# Patient Record
Sex: FEMALE | Race: ASIAN | NOT HISPANIC OR LATINO | URBAN - METROPOLITAN AREA
[De-identification: names, ages, dates, MRNs, and addresses within clinical notes are randomized per-mention and may not be internally consistent; named-entity substitution may affect disease eponyms.]

---

## 2018-04-01 ENCOUNTER — INPATIENT (INPATIENT)
Facility: HOSPITAL | Age: 23
LOS: 21 days | Discharge: HOME | End: 2018-04-23
Attending: PSYCHIATRY & NEUROLOGY | Admitting: PSYCHIATRY & NEUROLOGY
Payer: COMMERCIAL

## 2018-04-01 VITALS
TEMPERATURE: 98 F | SYSTOLIC BLOOD PRESSURE: 124 MMHG | HEART RATE: 98 BPM | OXYGEN SATURATION: 98 % | RESPIRATION RATE: 16 BRPM | DIASTOLIC BLOOD PRESSURE: 84 MMHG

## 2018-04-01 DIAGNOSIS — F43.10 POST-TRAUMATIC STRESS DISORDER, UNSPECIFIED: ICD-10-CM

## 2018-04-01 DIAGNOSIS — R45.851 SUICIDAL IDEATIONS: ICD-10-CM

## 2018-04-01 LAB
ALBUMIN SERPL ELPH-MCNC: 4.7 G/DL — SIGNIFICANT CHANGE UP (ref 3.3–5)
ALP SERPL-CCNC: 54 U/L — SIGNIFICANT CHANGE UP (ref 40–120)
ALT FLD-CCNC: 15 U/L — SIGNIFICANT CHANGE UP (ref 4–33)
AMORPH CRY # UR COMP ASSIST: SIGNIFICANT CHANGE UP (ref 0–0)
AMPHET UR-MCNC: NEGATIVE — SIGNIFICANT CHANGE UP
APAP SERPL-MCNC: < 15 UG/ML — LOW (ref 15–25)
APPEARANCE UR: SIGNIFICANT CHANGE UP
AST SERPL-CCNC: 17 U/L — SIGNIFICANT CHANGE UP (ref 4–32)
BACTERIA # UR AUTO: SIGNIFICANT CHANGE UP
BARBITURATES UR SCN-MCNC: NEGATIVE — SIGNIFICANT CHANGE UP
BASOPHILS # BLD AUTO: 0.04 K/UL — SIGNIFICANT CHANGE UP (ref 0–0.2)
BASOPHILS NFR BLD AUTO: 0.4 % — SIGNIFICANT CHANGE UP (ref 0–2)
BENZODIAZ UR-MCNC: NEGATIVE — SIGNIFICANT CHANGE UP
BILIRUB SERPL-MCNC: 0.3 MG/DL — SIGNIFICANT CHANGE UP (ref 0.2–1.2)
BILIRUB UR-MCNC: NEGATIVE — SIGNIFICANT CHANGE UP
BLOOD UR QL VISUAL: HIGH
BUN SERPL-MCNC: 7 MG/DL — SIGNIFICANT CHANGE UP (ref 7–23)
CALCIUM SERPL-MCNC: 9.2 MG/DL — SIGNIFICANT CHANGE UP (ref 8.4–10.5)
CANNABINOIDS UR-MCNC: POSITIVE — SIGNIFICANT CHANGE UP
CHLORIDE SERPL-SCNC: 96 MMOL/L — LOW (ref 98–107)
CO2 SERPL-SCNC: 28 MMOL/L — SIGNIFICANT CHANGE UP (ref 22–31)
COCAINE METAB.OTHER UR-MCNC: NEGATIVE — SIGNIFICANT CHANGE UP
COD CRY URNS QL: SIGNIFICANT CHANGE UP (ref 0–0)
COLOR SPEC: YELLOW — SIGNIFICANT CHANGE UP
CREAT SERPL-MCNC: 0.64 MG/DL — SIGNIFICANT CHANGE UP (ref 0.5–1.3)
EOSINOPHIL # BLD AUTO: 0.02 K/UL — SIGNIFICANT CHANGE UP (ref 0–0.5)
EOSINOPHIL NFR BLD AUTO: 0.2 % — SIGNIFICANT CHANGE UP (ref 0–6)
ETHANOL BLD-MCNC: < 10 MG/DL — SIGNIFICANT CHANGE UP
GLUCOSE SERPL-MCNC: 125 MG/DL — HIGH (ref 70–99)
GLUCOSE UR-MCNC: NEGATIVE — SIGNIFICANT CHANGE UP
GRAN CASTS # UR COMP ASSIST: SIGNIFICANT CHANGE UP
HCT VFR BLD CALC: 40.7 % — SIGNIFICANT CHANGE UP (ref 34.5–45)
HGB BLD-MCNC: 13.7 G/DL — SIGNIFICANT CHANGE UP (ref 11.5–15.5)
HIV1 AG SER QL: SIGNIFICANT CHANGE UP
HIV1+2 AB SPEC QL: SIGNIFICANT CHANGE UP
IMM GRANULOCYTES # BLD AUTO: 0.04 # — SIGNIFICANT CHANGE UP
IMM GRANULOCYTES NFR BLD AUTO: 0.4 % — SIGNIFICANT CHANGE UP (ref 0–1.5)
KETONES UR-MCNC: NEGATIVE — SIGNIFICANT CHANGE UP
LEUKOCYTE ESTERASE UR-ACNC: NEGATIVE — SIGNIFICANT CHANGE UP
LYMPHOCYTES # BLD AUTO: 1.83 K/UL — SIGNIFICANT CHANGE UP (ref 1–3.3)
LYMPHOCYTES # BLD AUTO: 19.9 % — SIGNIFICANT CHANGE UP (ref 13–44)
MAGNESIUM SERPL-MCNC: 2.2 MG/DL — SIGNIFICANT CHANGE UP (ref 1.6–2.6)
MCHC RBC-ENTMCNC: 33.3 PG — SIGNIFICANT CHANGE UP (ref 27–34)
MCHC RBC-ENTMCNC: 33.7 % — SIGNIFICANT CHANGE UP (ref 32–36)
MCV RBC AUTO: 99 FL — SIGNIFICANT CHANGE UP (ref 80–100)
METHADONE UR-MCNC: NEGATIVE — SIGNIFICANT CHANGE UP
MONOCYTES # BLD AUTO: 0.67 K/UL — SIGNIFICANT CHANGE UP (ref 0–0.9)
MONOCYTES NFR BLD AUTO: 7.3 % — SIGNIFICANT CHANGE UP (ref 2–14)
MUCOUS THREADS # UR AUTO: SIGNIFICANT CHANGE UP
NEUTROPHILS # BLD AUTO: 6.58 K/UL — SIGNIFICANT CHANGE UP (ref 1.8–7.4)
NEUTROPHILS NFR BLD AUTO: 71.8 % — SIGNIFICANT CHANGE UP (ref 43–77)
NITRITE UR-MCNC: NEGATIVE — SIGNIFICANT CHANGE UP
NRBC # FLD: 0 — SIGNIFICANT CHANGE UP
OPIATES UR-MCNC: NEGATIVE — SIGNIFICANT CHANGE UP
OXYCODONE UR-MCNC: NEGATIVE — SIGNIFICANT CHANGE UP
PCP UR-MCNC: NEGATIVE — SIGNIFICANT CHANGE UP
PH UR: 7 — SIGNIFICANT CHANGE UP (ref 4.6–8)
PHOSPHATE SERPL-MCNC: 3.9 MG/DL — SIGNIFICANT CHANGE UP (ref 2.5–4.5)
PLATELET # BLD AUTO: 265 K/UL — SIGNIFICANT CHANGE UP (ref 150–400)
PMV BLD: 11 FL — SIGNIFICANT CHANGE UP (ref 7–13)
POTASSIUM SERPL-MCNC: 3.3 MMOL/L — LOW (ref 3.5–5.3)
POTASSIUM SERPL-SCNC: 3.3 MMOL/L — LOW (ref 3.5–5.3)
PROT SERPL-MCNC: 7.9 G/DL — SIGNIFICANT CHANGE UP (ref 6–8.3)
PROT UR-MCNC: 30 MG/DL — HIGH
RBC # BLD: 4.11 M/UL — SIGNIFICANT CHANGE UP (ref 3.8–5.2)
RBC # FLD: 11.7 % — SIGNIFICANT CHANGE UP (ref 10.3–14.5)
RBC CASTS # UR COMP ASSIST: HIGH (ref 0–?)
SALICYLATES SERPL-MCNC: < 5 MG/DL — LOW (ref 15–30)
SODIUM SERPL-SCNC: 137 MMOL/L — SIGNIFICANT CHANGE UP (ref 135–145)
SP GR SPEC: 1.02 — SIGNIFICANT CHANGE UP (ref 1–1.04)
SQUAMOUS # UR AUTO: SIGNIFICANT CHANGE UP
TSH SERPL-MCNC: 1.75 UIU/ML — SIGNIFICANT CHANGE UP (ref 0.27–4.2)
UROBILINOGEN FLD QL: NORMAL MG/DL — SIGNIFICANT CHANGE UP
WBC # BLD: 9.18 K/UL — SIGNIFICANT CHANGE UP (ref 3.8–10.5)
WBC # FLD AUTO: 9.18 K/UL — SIGNIFICANT CHANGE UP (ref 3.8–10.5)
WBC UR QL: SIGNIFICANT CHANGE UP (ref 0–?)

## 2018-04-01 PROCEDURE — 71046 X-RAY EXAM CHEST 2 VIEWS: CPT | Mod: 26

## 2018-04-01 PROCEDURE — 99285 EMERGENCY DEPT VISIT HI MDM: CPT | Mod: GC

## 2018-04-01 RX ORDER — DIPHENHYDRAMINE HCL 50 MG
50 CAPSULE ORAL EVERY 6 HOURS
Qty: 0 | Refills: 0 | Status: DISCONTINUED | OUTPATIENT
Start: 2018-04-01 | End: 2018-04-23

## 2018-04-01 RX ORDER — HALOPERIDOL DECANOATE 100 MG/ML
1 INJECTION INTRAMUSCULAR EVERY 6 HOURS
Qty: 0 | Refills: 0 | Status: DISCONTINUED | OUTPATIENT
Start: 2018-04-01 | End: 2018-04-23

## 2018-04-01 RX ORDER — HYDROXYZINE HCL 10 MG
50 TABLET ORAL EVERY 6 HOURS
Qty: 0 | Refills: 0 | Status: DISCONTINUED | OUTPATIENT
Start: 2018-04-01 | End: 2018-04-23

## 2018-04-01 RX ORDER — SODIUM CHLORIDE 9 MG/ML
1000 INJECTION INTRAMUSCULAR; INTRAVENOUS; SUBCUTANEOUS ONCE
Qty: 0 | Refills: 0 | Status: COMPLETED | OUTPATIENT
Start: 2018-04-01 | End: 2018-04-01

## 2018-04-01 RX ADMIN — SODIUM CHLORIDE 2000 MILLILITER(S): 9 INJECTION INTRAMUSCULAR; INTRAVENOUS; SUBCUTANEOUS at 10:00
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
## 2018-04-01 NOTE — ED BEHAVIORAL HEALTH ASSESSMENT NOTE - MEDICAL ISSUES AND PLAN (INCLUDE STANDING AND PRN MEDICATION)
encourage eating as collateral states patient with 14 pound weight loss over past 6 months; monitor weight (if IV fluids needed, consider transfer to [facility])
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
## 2018-04-01 NOTE — ED BEHAVIORAL HEALTH ASSESSMENT NOTE - DESCRIPTION
calm, cooperative, at times with speech latency, laughing (stating she is nervous), crying at times lives with family in [place], at times travels from aunt's house to college as it is closer childhood asthma (grew out of it) calm, cooperative, at times with speech latency, laughing (stating she is nervous), crying at times, paranoid, wondering what's going on childhood asthma (grew out of it, not on medications currently) lives with family in [place], father is medical director of spine services at [employer], at times travels from aunt's house to college as it is closer

## 2018-04-01 NOTE — ED PROVIDER NOTE - PROGRESS NOTE DETAILS
JW  Father in room during interview.  24 y/o F no pmh p/w syncope at midnight. pt's father stated they had given her nyquil to sleep at midnight because the patient was insomnic then pt slept for 2 hours, woke up "delirious" per father suffered episode of syncope.  No fall or head strike.  No fevers, chills, sweats, weight loss, fatigue, or malaise. Pt states she has not eaten the past two days.  No other illness the past week.  Father in the room demanding IV line.  States he is the doctor.  PT refusing IV requesting PO. Pt states she lied to her father, begins crying then is silent refusing to provide additional history.  Pt endorses substance abuse does not disclose the type.   VS WNL.  Repeat exam: Physical Exam: General: alert and oriented x3 in no acute distress.  Cardiovascular: Regular rate and rhythm, S1 and S2 normal.  No murmurs, rubs, or gallops.  Pulses equal bilaterally.  No carotid bruits.  No peripheral edema or JVD.  Respiratory: No respiratory distress.  Lungs clear to auscultation bilaterally without adventitial breath sounds.  Abdominal: Non-distended, normal bowel sounds in all four quadrants, non tender to percussion and palpation in all four quadrants.  No mass, rigidity, or guarding.  Extremities: No sign of trauma, inflammation, or effusion.  Full range of motion in the cervical, lumbar, and thoracic spine and all four extremities without limitation.  No peripheral edema.  Neurological: AOx3 NAD.  Cranial nerves I-XII intact.  Normal gross motor and sensory function. Pt ambulatory.  Psych: Anxious appearing, no eye contact.  Speech clear, thought content normal, thought process linear.  No auditory or visual hallucinations.  Pt denies SI, plan, attempt. A/P DDX FTT, psychosis, anorexia.  Social issues.  Evaluate for syncope, clear for psychiatric evaluation, treat symptomatically, admit to psych. Patient removed IV, agrees to drink water, father now states that patient voiced SI to him earlier.  Patient denies this. JW mild hypokalemia. Pt tolerating PO. Pt endorses SI.  Psych consultation placed.
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
## 2018-04-01 NOTE — ED BEHAVIORAL HEALTH ASSESSMENT NOTE - RISK ASSESSMENT
Acute risk factors include recent trauma, insomnia, suspected mood episode, recent substance use, severe anxiety/paranoia, decreased appetite with substantial weight loss, limited insight into illness, confusion. Chronic risk factors include genetic loading for mental illness. Protective risk factors include intelligent, no history of suicide attempts, no self injurious behavior, no reported SI/HI or AVH. Acute risk factors include recent trauma, insomnia, suspected mood episode, recent substance use, severe anxiety/paranoia, decreased appetite with substantial weight loss, limited insight into illness, confusion. Chronic risk factors include genetic loading for mental illness. Protective risk factors include intelligent, no history of suicide attempts, no self injurious behavior, no reported SI/HI or AVH. Currently, risk factors significantly outweigh protective factors, warranting inpatient psychiatric hospitalization as patient has significantly decompensated from baseline, not tending to ADLs for past few days, with passive SI, ambivalent intent/plan, very paranoid in setting of ongoing PTSD symptoms from acute trauma months ago. Risk will be mitigated by inpatient hospitalization for safety, stabilization, and medication management.
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
## 2018-04-01 NOTE — ED ADULT NURSE NOTE - OBJECTIVE STATEMENT
Patient brought in by father for syncopal episode.  Patient pacing around the room, appears paranoid, hesitant to speak around father and Patient brought in by father for syncopal episode.  Patient pacing around the room, appears paranoid and hesitant to speak around her father.  Patient declined to answer any questions at this time.  Per father, patient not feeling well.  She pulled out her IV, unable to redirect, MD notified.  Placed on 1:1 for safety.  PCA at bedside and will continue to monitor patient.

## 2018-04-01 NOTE — ED PROVIDER NOTE - ATTENDING CONTRIBUTION TO CARE
Dr. Briseno: I have personally performed a face to face bedside history and physical examination of this patient. I have discussed the history, examination, review of systems, assessment and plan of management with the resident. I have reviewed the electronic medical record and amended it to reflect my history, review of systems, physical exam, assessment and plan.    Attending Exam - Dr. Briseno: GEN: well appearing, NAD  HEENT: +PERRL, EOMI  RESP: CTAB, no signs of respiratory distress CV: s1s2 RRR ABD: soft/non tender/non distended  MSK: no deformities / swelling, normal range of motion, spine grossly normal NEURO: alert, non focal exam SKIN: normal color / temperature / condition. PSYCH: appears anxious, tearful at times, evasive when questioned and will give contradictory answers.  Patient voices no SI/HI to provider.  Appears slightly paranoid at times.

## 2018-04-01 NOTE — ED ADULT TRIAGE NOTE - CHIEF COMPLAINT QUOTE
Pt c/o "not feeling right & dizziness" sp 2 witnessed syncopal eps by father who states she never hit her head & had approx LOC 20 secs. Denies any other adverse symptoms at this time. Father states pt has had insomnia & decreased PO intake x 2 nights. FS in field 101 mg/dL.

## 2018-04-01 NOTE — ED BEHAVIORAL HEALTH ASSESSMENT NOTE - DESCRIPTION (FIRST USE, LAST USE, QUANTITY, FREQUENCY, DURATION)
intermittently for past few months as per father, three times in past few months Adderall Adderall, "orange pill"
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
## 2018-04-01 NOTE — ED BEHAVIORAL HEALTH NOTE - BEHAVIORAL HEALTH NOTE
Writer spoke with [employer] #[phone] and received one day auth #[id number]. Review due on 4/2/18 with reviewer to be assigned on following business day.

## 2018-04-01 NOTE — ED ADULT NURSE REASSESSMENT NOTE - NS ED NURSE REASSESS COMMENT FT1
Received report from JEISON Maxwell pt bought to   calm & cooperative in nad denies Si/Hi/AVh at present eval on going.
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
## 2018-04-01 NOTE — ED BEHAVIORAL HEALTH ASSESSMENT NOTE - PSYCHIATRIC ISSUES AND PLAN (INCLUDE STANDING AND PRN MEDICATION)
Benadryl 50 mg PO QHS PRN for insomnia, hydroxyzine 50 mg PO Q6 hr PRN for anxiety, Ativan 1 mg PO Q 6 hr PRN for severe anxiety, Haldol 1 mg PO Q6 hr PRN for severe agitation/paranois (prefer Abilify PO if patient continues to be paranoid despite sleep and anxiety resolution), consider Prazosin 1 mg PO QHS if patient reports nightmare at night Benadryl 50 mg PO QHS PRN for insomnia, hydroxyzine 50 mg PO Q6 hr PRN for anxiety, Ativan 1 mg PO Q 6 hr PRN for severe anxiety, Haldol 1 mg PO Q6 hr PRN for severe agitation/paranois (prefer Abilify PO if patient continues to be paranoid despite sleep and anxiety resolution), consider Prazosin 1 mg PO QHS if patient reports nightmare at night, consider antidepressant (Zoloft, Prozac) for depressive and anxiety symptoms if continue to persist

## 2018-04-01 NOTE — ED BEHAVIORAL HEALTH ASSESSMENT NOTE - SUICIDE PROTECTIVE FACTORS
Supportive social network or family/Engaged in work or school/Future oriented/Responsibility to family and others/Identifies reasons for living

## 2018-04-01 NOTE — ED BEHAVIORAL HEALTH ASSESSMENT NOTE - OTHER PAST PSYCHIATRIC HISTORY (INCLUDE DETAILS REGARDING ONSET, COURSE OF ILLNESS, INPATIENT/OUTPATIENT TREATMENT)
no previous hospitalizations no previous hospitalizations, has been seeing therapist/counselor intermittently on campus

## 2018-04-01 NOTE — ED BEHAVIORAL HEALTH ASSESSMENT NOTE - NS ED [facility] ED COURSE PSYCHIATRIC MEDICATION GIVEN
Problem: Goal Outcome Summary  Goal: Goal Outcome Summary  Outcome: Improving  Afebrile. Pain well controlled with tylenol and ibuprofen. Good PO intake, no stool, use pear juice BID per home regimen. Lung sounds clear, but pt does have upper airway congestion. One dose of IV decadron given this afternoon per ENT request. Multiple oxygen desaturations into the 80s with sleep but pt self resolves in 10-15 seconds. No apnea seen. Monitor overnight for oxygen desaturations requiring intervention and for any apnea. Mother and father at bedside, attentive to pt, participating in cares. Pt transferred to Unit 6 for monitoring overnight.        None Oral Medication (indication, name, dose, time)

## 2018-04-02 LAB
BACTERIA UR CULT: SIGNIFICANT CHANGE UP
SPECIMEN SOURCE: SIGNIFICANT CHANGE UP

## 2018-04-02 RX ORDER — RISPERIDONE 4 MG/1
0.5 TABLET ORAL
Qty: 0 | Refills: 0 | Status: DISCONTINUED | OUTPATIENT
Start: 2018-04-02 | End: 2018-04-23

## 2018-04-02 RX ORDER — RISPERIDONE 4 MG/1
1 TABLET ORAL ONCE
Qty: 0 | Refills: 0 | Status: COMPLETED | OUTPATIENT
Start: 2018-04-02 | End: 2018-04-02

## 2018-04-02 RX ORDER — RISPERIDONE 4 MG/1
1 TABLET ORAL AT BEDTIME
Qty: 0 | Refills: 0 | Status: DISCONTINUED | OUTPATIENT
Start: 2018-04-02 | End: 2018-04-04

## 2018-04-02 RX ADMIN — Medication 30 MILLILITER(S): at 03:35

## 2018-04-02 RX ADMIN — Medication 50 MILLIGRAM(S): at 00:20

## 2018-04-02 RX ADMIN — Medication 1 MILLIGRAM(S): at 12:11

## 2018-04-02 RX ADMIN — Medication 1 MILLIGRAM(S): at 21:20

## 2018-04-02 RX ADMIN — RISPERIDONE 1 MILLIGRAM(S): 4 TABLET ORAL at 12:11

## 2018-04-02 RX ADMIN — Medication 50 MILLIGRAM(S): at 02:37

## 2018-04-02 RX ADMIN — Medication 500 MILLIGRAM(S): at 04:56

## 2018-04-02 RX ADMIN — RISPERIDONE 1 MILLIGRAM(S): 4 TABLET ORAL at 21:20

## 2018-04-02 NOTE — CHART NOTE - NSCHARTNOTEFT_GEN_A_CORE
23 year old female with no pertinent PMH presenting today complaining of abdominal pain x 2 hours. Pt is AAOx3 and seems anxious upon exam. Pt states she has diffuse abdominal pain across b/l lower quadrants and can not pinpoint exact location. Pt states she can not tell what makes pain better or worse. Pt vitals are WNL.   On physical exam abdomen is soft, nontender to palpation, no guarding noted, Normoactive bowel sounds heard in all 4 quadrants.   Pt given Naproxen 500mg for pain and Maalox for dyspepsia. Will continue to monitor. 23 year old female with no pertinent PMH presenting today complaining of abdominal pain x 2 hours. Pt is AAOx3 and seems anxious upon exam. Pt states she has diffuse abdominal pain across b/l lower quadrants and can not pinpoint exact location. Pt states she can not tell what makes pain better or worse. Pt vitals are WNL. Pt denies going through menstrual cramps currently. Denies any fever, chills, SOB, chest pain, N/V/D/C.    On physical exam abdomen is soft, nontender to palpation, no guarding noted, Normoactive bowel sounds heard in all 4 quadrants. Lungs clear to auscultation B/l. Heart regular rate and rhythm; +s1 and +s2, no murmurs or gallops.  Pt given Naproxen 500mg for pain and Maalox for dyspepsia. Will continue to monitor.

## 2018-04-03 RX ADMIN — Medication 1 MILLIGRAM(S): at 08:51

## 2018-04-03 RX ADMIN — Medication 1 MILLIGRAM(S): at 13:07

## 2018-04-03 RX ADMIN — RISPERIDONE 1 MILLIGRAM(S): 4 TABLET ORAL at 21:36

## 2018-04-03 RX ADMIN — Medication 2 MILLIGRAM(S): at 21:36

## 2018-04-04 PROCEDURE — 99232 SBSQ HOSP IP/OBS MODERATE 35: CPT

## 2018-04-04 RX ORDER — RISPERIDONE 4 MG/1
2 TABLET ORAL AT BEDTIME
Qty: 0 | Refills: 0 | Status: DISCONTINUED | OUTPATIENT
Start: 2018-04-04 | End: 2018-04-10

## 2018-04-04 RX ADMIN — Medication 2 MILLIGRAM(S): at 21:22

## 2018-04-04 RX ADMIN — Medication 1 MILLIGRAM(S): at 16:46

## 2018-04-04 RX ADMIN — Medication 1 MILLIGRAM(S): at 13:39

## 2018-04-04 RX ADMIN — RISPERIDONE 1 MILLIGRAM(S): 4 TABLET ORAL at 21:21

## 2018-04-04 RX ADMIN — Medication 1 MILLIGRAM(S): at 08:14

## 2018-04-05 PROCEDURE — 99233 SBSQ HOSP IP/OBS HIGH 50: CPT | Mod: 25

## 2018-04-05 PROCEDURE — 90853 GROUP PSYCHOTHERAPY: CPT

## 2018-04-05 RX ORDER — FLUOXETINE HCL 10 MG
20 CAPSULE ORAL ONCE
Qty: 0 | Refills: 0 | Status: COMPLETED | OUTPATIENT
Start: 2018-04-05 | End: 2018-04-05

## 2018-04-05 RX ORDER — FLUOXETINE HCL 10 MG
20 CAPSULE ORAL DAILY
Qty: 0 | Refills: 0 | Status: DISCONTINUED | OUTPATIENT
Start: 2018-04-05 | End: 2018-04-10

## 2018-04-05 RX ADMIN — Medication 20 MILLIGRAM(S): at 12:13

## 2018-04-05 RX ADMIN — Medication 2 MILLIGRAM(S): at 22:21

## 2018-04-05 RX ADMIN — RISPERIDONE 2 MILLIGRAM(S): 4 TABLET ORAL at 22:21

## 2018-04-06 PROCEDURE — 99233 SBSQ HOSP IP/OBS HIGH 50: CPT

## 2018-04-06 RX ORDER — RISPERIDONE 4 MG/1
1 TABLET ORAL DAILY
Qty: 0 | Refills: 0 | Status: DISCONTINUED | OUTPATIENT
Start: 2018-04-07 | End: 2018-04-10

## 2018-04-06 RX ORDER — RISPERIDONE 4 MG/1
1 TABLET ORAL ONCE
Qty: 0 | Refills: 0 | Status: COMPLETED | OUTPATIENT
Start: 2018-04-06 | End: 2018-04-06

## 2018-04-06 RX ADMIN — RISPERIDONE 1 MILLIGRAM(S): 4 TABLET ORAL at 14:06

## 2018-04-06 RX ADMIN — RISPERIDONE 2 MILLIGRAM(S): 4 TABLET ORAL at 21:38

## 2018-04-06 RX ADMIN — Medication 1 MILLIGRAM(S): at 14:06

## 2018-04-06 RX ADMIN — Medication 2 MILLIGRAM(S): at 21:38

## 2018-04-06 RX ADMIN — Medication 20 MILLIGRAM(S): at 09:31

## 2018-04-06 RX ADMIN — Medication 1 MILLIGRAM(S): at 09:31

## 2018-04-07 RX ADMIN — Medication 20 MILLIGRAM(S): at 09:55

## 2018-04-07 RX ADMIN — Medication 1 MILLIGRAM(S): at 09:55

## 2018-04-07 RX ADMIN — RISPERIDONE 2 MILLIGRAM(S): 4 TABLET ORAL at 20:42

## 2018-04-07 RX ADMIN — Medication 2 MILLIGRAM(S): at 20:42

## 2018-04-07 RX ADMIN — RISPERIDONE 1 MILLIGRAM(S): 4 TABLET ORAL at 09:55

## 2018-04-08 RX ADMIN — Medication 20 MILLIGRAM(S): at 08:51

## 2018-04-08 RX ADMIN — Medication 1 MILLIGRAM(S): at 08:51

## 2018-04-08 RX ADMIN — RISPERIDONE 1 MILLIGRAM(S): 4 TABLET ORAL at 08:51

## 2018-04-08 RX ADMIN — RISPERIDONE 2 MILLIGRAM(S): 4 TABLET ORAL at 21:13

## 2018-04-09 PROCEDURE — 99233 SBSQ HOSP IP/OBS HIGH 50: CPT

## 2018-04-09 PROCEDURE — 90832 PSYTX W PT 30 MINUTES: CPT

## 2018-04-09 RX ADMIN — Medication 1 MILLIGRAM(S): at 12:32

## 2018-04-09 RX ADMIN — RISPERIDONE 2 MILLIGRAM(S): 4 TABLET ORAL at 20:41

## 2018-04-09 RX ADMIN — RISPERIDONE 1 MILLIGRAM(S): 4 TABLET ORAL at 08:54

## 2018-04-09 RX ADMIN — Medication 20 MILLIGRAM(S): at 08:54

## 2018-04-09 RX ADMIN — Medication 2 MILLIGRAM(S): at 20:41

## 2018-04-09 RX ADMIN — Medication 1 MILLIGRAM(S): at 08:54

## 2018-04-10 PROCEDURE — 99232 SBSQ HOSP IP/OBS MODERATE 35: CPT

## 2018-04-10 RX ORDER — FLUOXETINE HCL 10 MG
20 CAPSULE ORAL DAILY
Qty: 0 | Refills: 0 | Status: DISCONTINUED | OUTPATIENT
Start: 2018-04-10 | End: 2018-04-17

## 2018-04-10 RX ORDER — RISPERIDONE 4 MG/1
3 TABLET ORAL AT BEDTIME
Qty: 0 | Refills: 0 | Status: DISCONTINUED | OUTPATIENT
Start: 2018-04-10 | End: 2018-04-13

## 2018-04-10 RX ADMIN — Medication 1 MILLIGRAM(S): at 20:25

## 2018-04-10 RX ADMIN — Medication 20 MILLIGRAM(S): at 09:38

## 2018-04-10 RX ADMIN — RISPERIDONE 3 MILLIGRAM(S): 4 TABLET ORAL at 20:25

## 2018-04-10 RX ADMIN — Medication 1 MILLIGRAM(S): at 13:07

## 2018-04-10 RX ADMIN — Medication 1 MILLIGRAM(S): at 09:38

## 2018-04-11 PROCEDURE — 99232 SBSQ HOSP IP/OBS MODERATE 35: CPT

## 2018-04-11 PROCEDURE — 90832 PSYTX W PT 30 MINUTES: CPT

## 2018-04-11 RX ADMIN — Medication 1 MILLIGRAM(S): at 21:22

## 2018-04-11 RX ADMIN — Medication 1 MILLIGRAM(S): at 09:52

## 2018-04-11 RX ADMIN — RISPERIDONE 3 MILLIGRAM(S): 4 TABLET ORAL at 21:22

## 2018-04-11 RX ADMIN — Medication 20 MILLIGRAM(S): at 09:52

## 2018-04-11 RX ADMIN — Medication 1 MILLIGRAM(S): at 12:45

## 2018-04-12 PROCEDURE — 99232 SBSQ HOSP IP/OBS MODERATE 35: CPT

## 2018-04-12 RX ADMIN — Medication 1 MILLIGRAM(S): at 20:55

## 2018-04-12 RX ADMIN — Medication 1 MILLIGRAM(S): at 09:03

## 2018-04-12 RX ADMIN — Medication 20 MILLIGRAM(S): at 09:03

## 2018-04-12 RX ADMIN — RISPERIDONE 3 MILLIGRAM(S): 4 TABLET ORAL at 20:55

## 2018-04-12 RX ADMIN — Medication 1 MILLIGRAM(S): at 12:48

## 2018-04-13 PROCEDURE — 90853 GROUP PSYCHOTHERAPY: CPT

## 2018-04-13 PROCEDURE — 99232 SBSQ HOSP IP/OBS MODERATE 35: CPT | Mod: 25

## 2018-04-13 RX ORDER — RISPERIDONE 4 MG/1
4 TABLET ORAL AT BEDTIME
Qty: 0 | Refills: 0 | Status: DISCONTINUED | OUTPATIENT
Start: 2018-04-13 | End: 2018-04-23

## 2018-04-13 RX ORDER — RISPERIDONE 4 MG/1
43 TABLET ORAL AT BEDTIME
Qty: 0 | Refills: 0 | Status: DISCONTINUED | OUTPATIENT
Start: 2018-04-13 | End: 2018-04-13

## 2018-04-13 RX ADMIN — Medication 15 MILLILITER(S): at 18:37

## 2018-04-13 RX ADMIN — Medication 1 MILLIGRAM(S): at 14:00

## 2018-04-13 RX ADMIN — RISPERIDONE 4 MILLIGRAM(S): 4 TABLET ORAL at 20:33

## 2018-04-13 RX ADMIN — Medication 20 MILLIGRAM(S): at 08:25

## 2018-04-13 RX ADMIN — Medication 1 MILLIGRAM(S): at 20:33

## 2018-04-14 RX ORDER — PANTOPRAZOLE SODIUM 20 MG/1
40 TABLET, DELAYED RELEASE ORAL
Qty: 0 | Refills: 0 | Status: DISCONTINUED | OUTPATIENT
Start: 2018-04-14 | End: 2018-04-17

## 2018-04-14 RX ORDER — PANTOPRAZOLE SODIUM 20 MG/1
40 TABLET, DELAYED RELEASE ORAL ONCE
Qty: 0 | Refills: 0 | Status: DISCONTINUED | OUTPATIENT
Start: 2018-04-14 | End: 2018-04-23

## 2018-04-14 RX ADMIN — Medication 1 MILLIGRAM(S): at 13:30

## 2018-04-14 RX ADMIN — Medication 1 MILLIGRAM(S): at 21:28

## 2018-04-14 RX ADMIN — Medication 20 MILLIGRAM(S): at 09:28

## 2018-04-14 RX ADMIN — RISPERIDONE 4 MILLIGRAM(S): 4 TABLET ORAL at 21:28

## 2018-04-14 RX ADMIN — PANTOPRAZOLE SODIUM 40 MILLIGRAM(S): 20 TABLET, DELAYED RELEASE ORAL at 16:04

## 2018-04-14 RX ADMIN — Medication 1 MILLIGRAM(S): at 09:31

## 2018-04-15 RX ADMIN — Medication 1 MILLIGRAM(S): at 10:12

## 2018-04-15 RX ADMIN — Medication 1 MILLIGRAM(S): at 14:09

## 2018-04-15 RX ADMIN — PANTOPRAZOLE SODIUM 40 MILLIGRAM(S): 20 TABLET, DELAYED RELEASE ORAL at 10:12

## 2018-04-15 RX ADMIN — Medication 1 MILLIGRAM(S): at 21:12

## 2018-04-15 RX ADMIN — RISPERIDONE 4 MILLIGRAM(S): 4 TABLET ORAL at 21:12

## 2018-04-15 RX ADMIN — Medication 20 MILLIGRAM(S): at 10:12

## 2018-04-16 RX ADMIN — Medication 1 MILLIGRAM(S): at 20:28

## 2018-04-16 RX ADMIN — Medication 1 MILLIGRAM(S): at 12:49

## 2018-04-16 RX ADMIN — Medication 1 MILLIGRAM(S): at 08:52

## 2018-04-16 RX ADMIN — Medication 20 MILLIGRAM(S): at 08:52

## 2018-04-16 RX ADMIN — PANTOPRAZOLE SODIUM 40 MILLIGRAM(S): 20 TABLET, DELAYED RELEASE ORAL at 08:53

## 2018-04-16 RX ADMIN — RISPERIDONE 4 MILLIGRAM(S): 4 TABLET ORAL at 20:28

## 2018-04-17 RX ADMIN — Medication 1 MILLIGRAM(S): at 13:51

## 2018-04-17 RX ADMIN — Medication 1 MILLIGRAM(S): at 20:31

## 2018-04-17 RX ADMIN — RISPERIDONE 4 MILLIGRAM(S): 4 TABLET ORAL at 20:31

## 2018-04-17 RX ADMIN — Medication 20 MILLIGRAM(S): at 08:41

## 2018-04-17 RX ADMIN — Medication 1 MILLIGRAM(S): at 08:41

## 2018-04-18 RX ORDER — FLUOXETINE HCL 10 MG
40 CAPSULE ORAL ONCE
Qty: 0 | Refills: 0 | Status: COMPLETED | OUTPATIENT
Start: 2018-04-18 | End: 2018-04-18

## 2018-04-18 RX ORDER — FLUOXETINE HCL 10 MG
40 CAPSULE ORAL DAILY
Qty: 0 | Refills: 0 | Status: DISCONTINUED | OUTPATIENT
Start: 2018-04-18 | End: 2018-04-23

## 2018-04-18 RX ADMIN — RISPERIDONE 4 MILLIGRAM(S): 4 TABLET ORAL at 21:23

## 2018-04-18 RX ADMIN — Medication 40 MILLIGRAM(S): at 16:25

## 2018-04-18 RX ADMIN — Medication 1 MILLIGRAM(S): at 12:50

## 2018-04-18 RX ADMIN — Medication 1 MILLIGRAM(S): at 09:07

## 2018-04-19 LAB
ALBUMIN SERPL ELPH-MCNC: 4.3 G/DL — SIGNIFICANT CHANGE UP (ref 3.3–5)
ALP SERPL-CCNC: 55 U/L — SIGNIFICANT CHANGE UP (ref 40–120)
ALT FLD-CCNC: 14 U/L — SIGNIFICANT CHANGE UP (ref 4–33)
APPEARANCE UR: SIGNIFICANT CHANGE UP
AST SERPL-CCNC: 14 U/L — SIGNIFICANT CHANGE UP (ref 4–32)
BACTERIA # UR AUTO: SIGNIFICANT CHANGE UP
BILIRUB SERPL-MCNC: 0.5 MG/DL — SIGNIFICANT CHANGE UP (ref 0.2–1.2)
BILIRUB UR-MCNC: NEGATIVE — SIGNIFICANT CHANGE UP
BLOOD UR QL VISUAL: NEGATIVE — SIGNIFICANT CHANGE UP
BUN SERPL-MCNC: 11 MG/DL — SIGNIFICANT CHANGE UP (ref 7–23)
CALCIUM SERPL-MCNC: 9.5 MG/DL — SIGNIFICANT CHANGE UP (ref 8.4–10.5)
CHLORIDE SERPL-SCNC: 99 MMOL/L — SIGNIFICANT CHANGE UP (ref 98–107)
CO2 SERPL-SCNC: 28 MMOL/L — SIGNIFICANT CHANGE UP (ref 22–31)
COLOR SPEC: SIGNIFICANT CHANGE UP
CREAT SERPL-MCNC: 0.68 MG/DL — SIGNIFICANT CHANGE UP (ref 0.5–1.3)
GLUCOSE SERPL-MCNC: 86 MG/DL — SIGNIFICANT CHANGE UP (ref 70–99)
GLUCOSE UR-MCNC: NEGATIVE — SIGNIFICANT CHANGE UP
HCG SERPL-ACNC: < 5 MIU/ML — SIGNIFICANT CHANGE UP
HCT VFR BLD CALC: 40.8 % — SIGNIFICANT CHANGE UP (ref 34.5–45)
HGB BLD-MCNC: 13.3 G/DL — SIGNIFICANT CHANGE UP (ref 11.5–15.5)
KETONES UR-MCNC: NEGATIVE — SIGNIFICANT CHANGE UP
LEUKOCYTE ESTERASE UR-ACNC: NEGATIVE — SIGNIFICANT CHANGE UP
LIDOCAIN IGE QN: 38.9 U/L — SIGNIFICANT CHANGE UP (ref 7–60)
MCHC RBC-ENTMCNC: 32.6 % — SIGNIFICANT CHANGE UP (ref 32–36)
MCHC RBC-ENTMCNC: 32.6 PG — SIGNIFICANT CHANGE UP (ref 27–34)
MCV RBC AUTO: 100 FL — SIGNIFICANT CHANGE UP (ref 80–100)
MUCOUS THREADS # UR AUTO: SIGNIFICANT CHANGE UP
NITRITE UR-MCNC: NEGATIVE — SIGNIFICANT CHANGE UP
NON-SQ EPI CELLS # UR AUTO: 1 — SIGNIFICANT CHANGE UP
NRBC # FLD: 0 — SIGNIFICANT CHANGE UP
PH UR: 7 — SIGNIFICANT CHANGE UP (ref 4.6–8)
PLATELET # BLD AUTO: 186 K/UL — SIGNIFICANT CHANGE UP (ref 150–400)
PMV BLD: 11.7 FL — SIGNIFICANT CHANGE UP (ref 7–13)
POTASSIUM SERPL-MCNC: 3.8 MMOL/L — SIGNIFICANT CHANGE UP (ref 3.5–5.3)
POTASSIUM SERPL-SCNC: 3.8 MMOL/L — SIGNIFICANT CHANGE UP (ref 3.5–5.3)
PROT SERPL-MCNC: 7.5 G/DL — SIGNIFICANT CHANGE UP (ref 6–8.3)
PROT UR-MCNC: NEGATIVE MG/DL — SIGNIFICANT CHANGE UP
RBC # BLD: 4.08 M/UL — SIGNIFICANT CHANGE UP (ref 3.8–5.2)
RBC # FLD: 11.1 % — SIGNIFICANT CHANGE UP (ref 10.3–14.5)
RBC CASTS # UR COMP ASSIST: SIGNIFICANT CHANGE UP (ref 0–?)
SODIUM SERPL-SCNC: 141 MMOL/L — SIGNIFICANT CHANGE UP (ref 135–145)
SP GR SPEC: 1.01 — SIGNIFICANT CHANGE UP (ref 1–1.04)
SQUAMOUS # UR AUTO: SIGNIFICANT CHANGE UP
UROBILINOGEN FLD QL: NORMAL MG/DL — SIGNIFICANT CHANGE UP
WBC # BLD: 9.63 K/UL — SIGNIFICANT CHANGE UP (ref 3.8–10.5)
WBC # FLD AUTO: 9.63 K/UL — SIGNIFICANT CHANGE UP (ref 3.8–10.5)
WBC UR QL: SIGNIFICANT CHANGE UP (ref 0–?)

## 2018-04-19 PROCEDURE — 99233 SBSQ HOSP IP/OBS HIGH 50: CPT

## 2018-04-19 PROCEDURE — 99223 1ST HOSP IP/OBS HIGH 75: CPT

## 2018-04-19 RX ORDER — ONDANSETRON 8 MG/1
4 TABLET, FILM COATED ORAL
Qty: 0 | Refills: 0 | Status: DISCONTINUED | OUTPATIENT
Start: 2018-04-19 | End: 2018-04-23

## 2018-04-19 RX ADMIN — Medication 40 MILLIGRAM(S): at 09:37

## 2018-04-19 RX ADMIN — ONDANSETRON 4 MILLIGRAM(S): 8 TABLET, FILM COATED ORAL at 17:03

## 2018-04-19 RX ADMIN — Medication 30 MILLILITER(S): at 03:22

## 2018-04-19 RX ADMIN — RISPERIDONE 4 MILLIGRAM(S): 4 TABLET ORAL at 21:56

## 2018-04-19 RX ADMIN — Medication 1 MILLIGRAM(S): at 09:37
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
## 2018-04-20 PROCEDURE — 99232 SBSQ HOSP IP/OBS MODERATE 35: CPT | Mod: 25

## 2018-04-20 PROCEDURE — 99232 SBSQ HOSP IP/OBS MODERATE 35: CPT

## 2018-04-20 RX ADMIN — RISPERIDONE 4 MILLIGRAM(S): 4 TABLET ORAL at 21:08

## 2018-04-20 RX ADMIN — Medication 1 MILLIGRAM(S): at 08:50

## 2018-04-20 RX ADMIN — Medication 40 MILLIGRAM(S): at 08:50

## 2018-04-20 NOTE — PROGRESS NOTE ADULT - SUBJECTIVE AND OBJECTIVE BOX
CC/Reason for Consult: Abdominal pain and vomiting    SUBJECTIVE / OVERNIGHT EVENTS: NO events overnight. Patient reports that abdominal pain has resolved. NO further episodes of vomiting. She had toast for breakfast with no issues. She feels well. I discussed her blood work with her as well. No other concerns today.     MEDICATIONS  (STANDING):  FLUoxetine 40 milliGRAM(s) Oral daily  risperiDONE   Tablet 4 milliGRAM(s) Oral at bedtime    MEDICATIONS  (PRN):  aluminum hydroxide/magnesium hydroxide/simethicone Suspension 30 milliLiter(s) Oral every 6 hours PRN Dyspepsia  bismuth subsalicylate Liquid 30 milliLiter(s) Oral every 4 hours PRN upset stomach  diphenhydrAMINE   Capsule 50 milliGRAM(s) Oral every 6 hours PRN EPS prophylaxis/insomnia  diphenhydrAMINE   Injectable 50 milliGRAM(s) IntraMuscular every 6 hours PRN Rash and/or Itching  haloperidol     Tablet 1 milliGRAM(s) Oral every 6 hours PRN severe agitation/psychosis  haloperidol    Injectable 1 milliGRAM(s) IntraMuscular every 6 hours PRN Severe Agitation  hydrOXYzine hydrochloride 50 milliGRAM(s) Oral every 6 hours PRN anxiety/agitation  ondansetron    Tablet 4 milliGRAM(s) Oral two times a day PRN Nausea and/or Vomiting  pantoprazole    Tablet 40 milliGRAM(s) Oral once PRN dyspepsia  risperiDONE   Tablet 0.5 milliGRAM(s) Oral two times a day PRN agitation    Vital Signs Last 24 Hrs  T(C): 36.1 (2018 08:10), Max: 36.1 (2018 08:10)  T(F): 97 (2018 08:10), Max: 97 (2018 08:10)  HR: 81 (2018 08:10) (81 - 81)  BP: 102/70 (2018 08:10) (102/70 - 102/70)  BP(mean): --  RR: --  SpO2: --    PHYSICAL EXAM:  GENERAL: NAD, well-developed  HEAD:  Atraumatic, Normocephalic  EYES: EOMI, conjunctiva and sclera clear  NECK: Supple  Pulm - unlabored   HEART: Regular rate and rhythm;   ABDOMEN: Soft, Nontender, Nondistended;   EXTREMITIES:  no edema  PSYCH: AAOx3  NEUROLOGY: non-focal  SKIN: No rashes or lesions    LABS:                        13.3   9.63  )-----------( 186      ( 2018 15:50 )             40.8     -    141  |  99  |  11  ----------------------------<  86  3.8   |  28  |  0.68    Ca    9.5      2018 15:50    TPro  7.5  /  Alb  4.3  /  TBili  0.5  /  DBili  x   /  AST  14  /  ALT  14  /  AlkPhos  55            Urinalysis Basic - ( 2018 16:45 )    Color: COLORL / Appearance: HAZY / S.009 / pH: 7.0  Gluc: NEGATIVE / Ketone: NEGATIVE  / Bili: NEGATIVE / Urobili: NORMAL mg/dL   Blood: NEGATIVE / Protein: NEGATIVE mg/dL / Nitrite: NEGATIVE   Leuk Esterase: NEGATIVE / RBC: 0-2 / WBC 2-5   Sq Epi: MANY / Non Sq Epi: x / Bacteria: FEW        RADIOLOGY & ADDITIONAL TESTS:    Imaging Personally Reviewed:    Consultant(s) Notes Reviewed:      Care Discussed with Consultants/Other Providers:
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
## 2018-04-21 RX ADMIN — Medication 40 MILLIGRAM(S): at 08:31

## 2018-04-21 RX ADMIN — RISPERIDONE 4 MILLIGRAM(S): 4 TABLET ORAL at 21:38

## 2018-04-22 RX ADMIN — Medication 40 MILLIGRAM(S): at 09:06

## 2018-04-22 RX ADMIN — RISPERIDONE 4 MILLIGRAM(S): 4 TABLET ORAL at 20:57

## 2018-04-23 VITALS — SYSTOLIC BLOOD PRESSURE: 110 MMHG | HEART RATE: 86 BPM | TEMPERATURE: 98 F | DIASTOLIC BLOOD PRESSURE: 70 MMHG

## 2018-04-23 PROCEDURE — 99239 HOSP IP/OBS DSCHRG MGMT >30: CPT

## 2018-04-23 RX ORDER — RISPERIDONE 4 MG/1
1 TABLET ORAL
Qty: 30 | Refills: 0
Start: 2018-04-23 | End: 2018-05-22

## 2018-04-23 RX ORDER — FLUOXETINE HCL 10 MG
1 CAPSULE ORAL
Qty: 30 | Refills: 0
Start: 2018-04-23 | End: 2018-05-22

## 2018-04-23 RX ADMIN — Medication 40 MILLIGRAM(S): at 09:10

## 2018-04-24 ENCOUNTER — OUTPATIENT (OUTPATIENT)
Dept: OUTPATIENT SERVICES | Facility: HOSPITAL | Age: 23
LOS: 1 days | Discharge: HOME | End: 2018-04-24

## 2018-05-01 DIAGNOSIS — F28 OTHER PSYCHOTIC DISORDER NOT DUE TO A SUBSTANCE OR KNOWN PHYSIOLOGICAL CONDITION: ICD-10-CM

## 2018-05-21 ENCOUNTER — OUTPATIENT (OUTPATIENT)
Dept: OUTPATIENT SERVICES | Facility: HOSPITAL | Age: 23
LOS: 1 days | Discharge: HOME | End: 2018-05-21
Payer: COMMERCIAL

## 2018-05-21 PROCEDURE — 90792 PSYCH DIAG EVAL W/MED SRVCS: CPT

## 2018-05-22 DIAGNOSIS — F29 UNSPECIFIED PSYCHOSIS NOT DUE TO A SUBSTANCE OR KNOWN PHYSIOLOGICAL CONDITION: ICD-10-CM

## 2018-06-11 PROCEDURE — 99213 OFFICE O/P EST LOW 20 MIN: CPT

## 2018-06-22 PROCEDURE — 99213 OFFICE O/P EST LOW 20 MIN: CPT

## 2018-06-28 ENCOUNTER — EMERGENCY (EMERGENCY)
Facility: HOSPITAL | Age: 23
LOS: 1 days | Discharge: HOME | End: 2018-06-28
Admitting: EMERGENCY MEDICINE
Payer: COMMERCIAL

## 2018-06-28 VITALS
HEART RATE: 102 BPM | SYSTOLIC BLOOD PRESSURE: 102 MMHG | OXYGEN SATURATION: 100 % | RESPIRATION RATE: 16 BRPM | DIASTOLIC BLOOD PRESSURE: 66 MMHG | TEMPERATURE: 98 F

## 2018-06-28 VITALS
RESPIRATION RATE: 18 BRPM | DIASTOLIC BLOOD PRESSURE: 75 MMHG | HEART RATE: 115 BPM | TEMPERATURE: 98 F | OXYGEN SATURATION: 100 %

## 2018-06-28 LAB
ALBUMIN SERPL ELPH-MCNC: 4.4 G/DL — SIGNIFICANT CHANGE UP (ref 3.3–5)
ALP SERPL-CCNC: 74 U/L — SIGNIFICANT CHANGE UP (ref 40–120)
ALT FLD-CCNC: 12 U/L — SIGNIFICANT CHANGE UP (ref 4–33)
APPEARANCE UR: CLEAR — SIGNIFICANT CHANGE UP
AST SERPL-CCNC: 16 U/L — SIGNIFICANT CHANGE UP (ref 4–32)
BASOPHILS # BLD AUTO: 0.06 K/UL — SIGNIFICANT CHANGE UP (ref 0–0.2)
BASOPHILS NFR BLD AUTO: 0.5 % — SIGNIFICANT CHANGE UP (ref 0–2)
BILIRUB SERPL-MCNC: 0.3 MG/DL — SIGNIFICANT CHANGE UP (ref 0.2–1.2)
BILIRUB UR-MCNC: NEGATIVE — SIGNIFICANT CHANGE UP
BLOOD UR QL VISUAL: NEGATIVE — SIGNIFICANT CHANGE UP
BUN SERPL-MCNC: 16 MG/DL — SIGNIFICANT CHANGE UP (ref 7–23)
CALCIUM SERPL-MCNC: 9.4 MG/DL — SIGNIFICANT CHANGE UP (ref 8.4–10.5)
CHLORIDE SERPL-SCNC: 100 MMOL/L — SIGNIFICANT CHANGE UP (ref 98–107)
CO2 SERPL-SCNC: 28 MMOL/L — SIGNIFICANT CHANGE UP (ref 22–31)
COLOR SPEC: SIGNIFICANT CHANGE UP
CREAT SERPL-MCNC: 1.03 MG/DL — SIGNIFICANT CHANGE UP (ref 0.5–1.3)
EOSINOPHIL # BLD AUTO: 0.28 K/UL — SIGNIFICANT CHANGE UP (ref 0–0.5)
EOSINOPHIL NFR BLD AUTO: 2.2 % — SIGNIFICANT CHANGE UP (ref 0–6)
GLUCOSE SERPL-MCNC: 86 MG/DL — SIGNIFICANT CHANGE UP (ref 70–99)
GLUCOSE UR-MCNC: NEGATIVE — SIGNIFICANT CHANGE UP
HCT VFR BLD CALC: 37.4 % — SIGNIFICANT CHANGE UP (ref 34.5–45)
HGB BLD-MCNC: 12.2 G/DL — SIGNIFICANT CHANGE UP (ref 11.5–15.5)
IMM GRANULOCYTES # BLD AUTO: 0.05 # — SIGNIFICANT CHANGE UP
IMM GRANULOCYTES NFR BLD AUTO: 0.4 % — SIGNIFICANT CHANGE UP (ref 0–1.5)
KETONES UR-MCNC: NEGATIVE — SIGNIFICANT CHANGE UP
LEUKOCYTE ESTERASE UR-ACNC: SIGNIFICANT CHANGE UP
LYMPHOCYTES # BLD AUTO: 16.8 % — SIGNIFICANT CHANGE UP (ref 13–44)
LYMPHOCYTES # BLD AUTO: 2.15 K/UL — SIGNIFICANT CHANGE UP (ref 1–3.3)
MCHC RBC-ENTMCNC: 31.9 PG — SIGNIFICANT CHANGE UP (ref 27–34)
MCHC RBC-ENTMCNC: 32.6 % — SIGNIFICANT CHANGE UP (ref 32–36)
MCV RBC AUTO: 97.7 FL — SIGNIFICANT CHANGE UP (ref 80–100)
MONOCYTES # BLD AUTO: 1.02 K/UL — HIGH (ref 0–0.9)
MONOCYTES NFR BLD AUTO: 8 % — SIGNIFICANT CHANGE UP (ref 2–14)
MUCOUS THREADS # UR AUTO: SIGNIFICANT CHANGE UP
NEUTROPHILS # BLD AUTO: 9.22 K/UL — HIGH (ref 1.8–7.4)
NEUTROPHILS NFR BLD AUTO: 72.1 % — SIGNIFICANT CHANGE UP (ref 43–77)
NITRITE UR-MCNC: NEGATIVE — SIGNIFICANT CHANGE UP
NON-SQ EPI CELLS # UR AUTO: <1 — SIGNIFICANT CHANGE UP
NRBC # FLD: 0 — SIGNIFICANT CHANGE UP
PH UR: 7 — SIGNIFICANT CHANGE UP (ref 4.6–8)
PLATELET # BLD AUTO: 224 K/UL — SIGNIFICANT CHANGE UP (ref 150–400)
PMV BLD: 11 FL — SIGNIFICANT CHANGE UP (ref 7–13)
POTASSIUM SERPL-MCNC: 4.1 MMOL/L — SIGNIFICANT CHANGE UP (ref 3.5–5.3)
POTASSIUM SERPL-SCNC: 4.1 MMOL/L — SIGNIFICANT CHANGE UP (ref 3.5–5.3)
PROT SERPL-MCNC: 8.1 G/DL — SIGNIFICANT CHANGE UP (ref 6–8.3)
PROT UR-MCNC: NEGATIVE MG/DL — SIGNIFICANT CHANGE UP
RBC # BLD: 3.83 M/UL — SIGNIFICANT CHANGE UP (ref 3.8–5.2)
RBC # FLD: 12.1 % — SIGNIFICANT CHANGE UP (ref 10.3–14.5)
SODIUM SERPL-SCNC: 140 MMOL/L — SIGNIFICANT CHANGE UP (ref 135–145)
SP GR SPEC: 1.01 — SIGNIFICANT CHANGE UP (ref 1–1.04)
SQUAMOUS # UR AUTO: SIGNIFICANT CHANGE UP
UROBILINOGEN FLD QL: NORMAL MG/DL — SIGNIFICANT CHANGE UP
WBC # BLD: 12.78 K/UL — HIGH (ref 3.8–10.5)
WBC # FLD AUTO: 12.78 K/UL — HIGH (ref 3.8–10.5)
WBC UR QL: SIGNIFICANT CHANGE UP (ref 0–?)

## 2018-06-28 PROCEDURE — 71046 X-RAY EXAM CHEST 2 VIEWS: CPT | Mod: 26

## 2018-06-28 PROCEDURE — 99284 EMERGENCY DEPT VISIT MOD MDM: CPT | Mod: 25

## 2018-06-28 PROCEDURE — 93010 ELECTROCARDIOGRAM REPORT: CPT | Mod: NC

## 2018-06-28 RX ORDER — PSEUDOEPHEDRINE HCL 30 MG
30 TABLET ORAL ONCE
Qty: 0 | Refills: 0 | Status: COMPLETED | OUTPATIENT
Start: 2018-06-28 | End: 2018-06-28

## 2018-06-28 RX ORDER — SODIUM CHLORIDE 9 MG/ML
1000 INJECTION INTRAMUSCULAR; INTRAVENOUS; SUBCUTANEOUS ONCE
Qty: 0 | Refills: 0 | Status: COMPLETED | OUTPATIENT
Start: 2018-06-28 | End: 2018-06-28

## 2018-06-28 RX ORDER — ACETAMINOPHEN 500 MG
650 TABLET ORAL ONCE
Qty: 0 | Refills: 0 | Status: COMPLETED | OUTPATIENT
Start: 2018-06-28 | End: 2018-06-28

## 2018-06-28 RX ADMIN — SODIUM CHLORIDE 1000 MILLILITER(S): 9 INJECTION INTRAMUSCULAR; INTRAVENOUS; SUBCUTANEOUS at 19:10

## 2018-06-28 RX ADMIN — SODIUM CHLORIDE 1000 MILLILITER(S): 9 INJECTION INTRAMUSCULAR; INTRAVENOUS; SUBCUTANEOUS at 17:30

## 2018-06-28 RX ADMIN — Medication 30 MILLIGRAM(S): at 17:30

## 2018-06-28 RX ADMIN — Medication 650 MILLIGRAM(S): at 20:21

## 2018-06-28 NOTE — ED PROVIDER NOTE - ENMT, MLM
Airway patent, Nasal mucosa clear. Mouth with normal mucosa. Throat mildly erythematous. Throat has no vesicles, no oropharyngeal exudates and uvula is midline.

## 2018-06-28 NOTE — ED PROVIDER NOTE - OBJECTIVE STATEMENT
22 yo F PMHX of anxiety and depression presents to the ED c/o sore throat, congestion, productive cough, chills x 1.5 weeks. This am felt fatigued had episode of feeling lightheaded/ dizzy, now resolved. Pt reports taking Nyquil and Dayquil at home for symptoms with minimal relief. Denies fevers, ear discomfort, nausea, vomiting, abdominal pain, cp, sob, urinary symptoms, neck pain, headache. Reports sick contacts her aunt and cousins all have similar symptoms. Denies smoking, alcohol use. NKDA. 22 yo F PMHX of anxiety and depression presents to the ED c/o sore throat, congestion, productive cough, chills x 1.5 weeks. This am felt fatigued had episode of feeling lightheaded/ dizzy, now resolved. Pt reports taking Nyquil and Dayquil at home for symptoms with minimal relief. Denies fevers, ear discomfort, nausea, vomiting, abdominal pain, cp, sob, urinary symptoms, neck pain, headache. Reports sick contacts her aunt and cousins all have similar symptoms. Denies smoking, alcohol use. NKDA.  20:35 Jaswinder att: Patient's father requested MD on case. 23F h/o anxiety depression on Risperidal and SSRI p/w lightheadedness. Past two weeks patient notes 24 yo F PMHX of anxiety and depression presents to the ED c/o sore throat, congestion, productive cough, chills x 1.5 weeks. This am felt fatigued had episode of feeling lightheaded/ dizzy, now resolved. Pt reports taking Nyquil and Dayquil at home for symptoms with minimal relief. Denies fevers, ear discomfort, nausea, vomiting, abdominal pain, cp, sob, urinary symptoms, neck pain, headache. Reports sick contacts her aunt and cousins all have similar symptoms. Denies smoking, alcohol use. NKDA.  20:35 Jaswinder att: Patient's father requested MD on case. 23 fully vaccinated F h/o anxiety depression on Risperidal and SSRI p/w lightheadedness. Past two weeks patient notes nasal congestion, sore throat, dry cough, chills. Multiple cousins at home have same symptoms. Today at meal patient c/o lightheadedness, home bp recording low. Patient denies loc, cp, sob, abd pain. Denies personal h/o cardiac disease. Patient feels much better after 2 L of fluid. TYlenol po in progress.

## 2018-06-28 NOTE — ED PROVIDER NOTE - PROGRESS NOTE DETAILS
Patient feeling much improved. Would like to go home. Discussion of fluids and rest. and tylenol for fever.  Return precautions given. Jaswinder att: Repeat  after 2L, pt just received tylenol. Tachycardia attributed to fever. Offered addl 1 hour observation to repeat HR. Patient prefers to go home. Return precautions discussed.

## 2018-06-28 NOTE — ED ADULT TRIAGE NOTE - CHIEF COMPLAINT QUOTE
pt states that she had an episode of dizziness at home, no LOC, states symptoms resolved at this time.  PMH depression

## 2018-06-28 NOTE — ED PROVIDER NOTE - MEDICAL DECISION MAKING DETAILS
22 yo F PMHX of anxiety and depression presents to the ED c/o sore throat, congestion, productive cough, chills x 1.5 weeks. This am felt fatigued had episode of feeling lightheaded/ dizzy, now resolved    Plan for EKG, labs, cxr, IVF, symptom relief reassess.

## 2018-06-28 NOTE — ED PROVIDER NOTE - ATTENDING CONTRIBUTION TO CARE
Dr. San: I performed a face to face bedside interview with patient regarding history of present illness, review of symptoms and past medical history. I completed an independent physical exam.  I have discussed patient's plan of care with PA.   I agree with note as stated above, having amended the EMR as needed to reflect my findings.   This includes HISTORY OF PRESENT ILLNESS, HIV, PAST MEDICAL/SURGICAL/FAMILY/SOCIAL HISTORY, ALLERGIES AND HOME MEDICATIONS, REVIEW OF SYSTEMS, PHYSICAL EXAM, and any PROGRESS NOTES during the time I functioned as the attending physician for this patient. HPI above as by me. PE above as by me. DDX lightheadedness setting of viral pharyngitis PLAN tylenol, rpt vitals, observe.

## 2018-06-28 NOTE — ED PROVIDER NOTE - CARE PLAN
Assessment and plan of treatment:	Seen in ED for cough, likely viral upper respiratory tract infection.  Please drink plenty of fluids and follow the instructions below:   1)	If you have fever greater than 100F or generalized bodyaches then please take Tylenol 650 mg every 4 hours and Motrin 600 mg every 6 hours.   2)	If you have head congestion, sinus pressure, or nasal congestion then please take Allegra 120-180 mg every 24 hours or Zyrtec 10 mg every 24 hours.    3)	If you have throat discomfort please take Cepacol or Chloraseptic spray. Check bottle to make sure alcohol free.   4)	If you have persistent dry cough please take Delsym or Robitussin.   5)	If you have difficulty bringing up mucus please take Mucinex use as directed.   6)	Make a follow-up appointment with your primary doctor.   7)	Return to ED for any new or worsening symptoms. Principal Discharge DX:	URI (upper respiratory infection)  Assessment and plan of treatment:	Seen in ED for cough, likely viral upper respiratory tract infection.  Please drink plenty of fluids and follow the instructions below:   1)	If you have fever greater than 100F or generalized bodyaches then please take Tylenol 650 mg every 4 hours and Motrin 600 mg every 6 hours.   2)	If you have head congestion, sinus pressure, or nasal congestion then please take Allegra 120-180 mg every 24 hours or Zyrtec 10 mg every 24 hours.    3)	If you have throat discomfort please take Cepacol or Chloraseptic spray. Check bottle to make sure alcohol free.   4)	If you have persistent dry cough please take Delsym or Robitussin.   5)	If you have difficulty bringing up mucus please take Mucinex use as directed.   6)	Make a follow-up appointment with your primary doctor.   7)	Return to ED for any new or worsening symptoms.

## 2018-06-28 NOTE — ED ADULT NURSE NOTE - OBJECTIVE STATEMENT
received pt to intake room 12 for evaluation of dizziness, sore throat, congestion, chills, productive cough with yellow sputum and generalized weakness and fatigue x 1.5 weeks. pt presents awake a&ox4, denies dizziness or ha, but endorses sinus pressure. received pt to intake room 12 for evaluation of dizziness, sore throat, congestion, chills, productive cough with yellow sputum and generalized weakness and fatigue x 1.5 weeks. pt presents awake a&ox4, denies dizziness or ha, but endorses sinus pressure. skin warm, dry, appropriate for race. respirations even, unlabored. denies cp or sob. abdomen soft nontender nondistended. denies n/v/d. denies fevers, endorses chills. ivl placed. bloods drawn and sent. ns 0.9% bolus infusing. pt medicated with sudafed. family at bedside.

## 2018-06-28 NOTE — ED PROVIDER NOTE - PLAN OF CARE
Seen in ED for cough, likely viral upper respiratory tract infection.  Please drink plenty of fluids and follow the instructions below:   1)	If you have fever greater than 100F or generalized bodyaches then please take Tylenol 650 mg every 4 hours and Motrin 600 mg every 6 hours.   2)	If you have head congestion, sinus pressure, or nasal congestion then please take Allegra 120-180 mg every 24 hours or Zyrtec 10 mg every 24 hours.    3)	If you have throat discomfort please take Cepacol or Chloraseptic spray. Check bottle to make sure alcohol free.   4)	If you have persistent dry cough please take Delsym or Robitussin.   5)	If you have difficulty bringing up mucus please take Mucinex use as directed.   6)	Make a follow-up appointment with your primary doctor.   7)	Return to ED for any new or worsening symptoms.

## 2018-06-28 NOTE — ED PROVIDER NOTE - PHYSICAL EXAMINATION
Jaswinder att: nad, aaox3, pos nasal congestion, throat erythematous, neg tonsillar swelling or erythema, ctabl, rrr, s1s2, abd soft ntnd, neg le edema

## 2018-06-30 LAB — SPECIMEN SOURCE: SIGNIFICANT CHANGE UP

## 2018-07-01 LAB — S PYO SPEC QL CULT: SIGNIFICANT CHANGE UP

## 2018-07-06 PROCEDURE — 99213 OFFICE O/P EST LOW 20 MIN: CPT

## 2018-07-27 PROCEDURE — 99213 OFFICE O/P EST LOW 20 MIN: CPT

## 2018-09-13 PROCEDURE — 99213 OFFICE O/P EST LOW 20 MIN: CPT

## 2018-09-13 PROCEDURE — 90833 PSYTX W PT W E/M 30 MIN: CPT

## 2018-10-15 PROCEDURE — 99213 OFFICE O/P EST LOW 20 MIN: CPT

## 2018-12-31 ENCOUNTER — EMERGENCY (EMERGENCY)
Facility: HOSPITAL | Age: 23
LOS: 1 days | Discharge: HOME | End: 2018-12-31
Attending: EMERGENCY MEDICINE | Admitting: EMERGENCY MEDICINE
Payer: COMMERCIAL

## 2018-12-31 VITALS — RESPIRATION RATE: 18 BRPM | SYSTOLIC BLOOD PRESSURE: 111 MMHG | DIASTOLIC BLOOD PRESSURE: 67 MMHG | HEART RATE: 82 BPM

## 2018-12-31 LAB
ALBUMIN SERPL ELPH-MCNC: 4.7 G/DL — SIGNIFICANT CHANGE UP (ref 3.3–5)
ALP SERPL-CCNC: 67 U/L — SIGNIFICANT CHANGE UP (ref 40–120)
ALT FLD-CCNC: 13 U/L — SIGNIFICANT CHANGE UP (ref 4–33)
AMPHET UR-MCNC: NEGATIVE — SIGNIFICANT CHANGE UP
APAP SERPL-MCNC: < 15 UG/ML — LOW (ref 15–25)
AST SERPL-CCNC: 18 U/L — SIGNIFICANT CHANGE UP (ref 4–32)
BARBITURATES UR SCN-MCNC: NEGATIVE — SIGNIFICANT CHANGE UP
BENZODIAZ UR-MCNC: NEGATIVE — SIGNIFICANT CHANGE UP
BILIRUB SERPL-MCNC: 0.2 MG/DL — SIGNIFICANT CHANGE UP (ref 0.2–1.2)
BUN SERPL-MCNC: 11 MG/DL — SIGNIFICANT CHANGE UP (ref 7–23)
CALCIUM SERPL-MCNC: 9.8 MG/DL — SIGNIFICANT CHANGE UP (ref 8.4–10.5)
CANNABINOIDS UR-MCNC: POSITIVE — SIGNIFICANT CHANGE UP
CHLORIDE SERPL-SCNC: 102 MMOL/L — SIGNIFICANT CHANGE UP (ref 98–107)
CO2 SERPL-SCNC: 20 MMOL/L — LOW (ref 22–31)
COCAINE METAB.OTHER UR-MCNC: NEGATIVE — SIGNIFICANT CHANGE UP
CREAT SERPL-MCNC: 0.72 MG/DL — SIGNIFICANT CHANGE UP (ref 0.5–1.3)
ETHANOL BLD-MCNC: 269 MG/DL — HIGH
GLUCOSE SERPL-MCNC: 102 MG/DL — HIGH (ref 70–99)
HCG SERPL-ACNC: < 5 MIU/ML — SIGNIFICANT CHANGE UP
HCT VFR BLD CALC: 42.5 % — SIGNIFICANT CHANGE UP (ref 34.5–45)
HGB BLD-MCNC: 13.7 G/DL — SIGNIFICANT CHANGE UP (ref 11.5–15.5)
MCHC RBC-ENTMCNC: 31.1 PG — SIGNIFICANT CHANGE UP (ref 27–34)
MCHC RBC-ENTMCNC: 32.2 % — SIGNIFICANT CHANGE UP (ref 32–36)
MCV RBC AUTO: 96.4 FL — SIGNIFICANT CHANGE UP (ref 80–100)
METHADONE UR-MCNC: NEGATIVE — SIGNIFICANT CHANGE UP
NRBC # FLD: 0 — SIGNIFICANT CHANGE UP
OPIATES UR-MCNC: NEGATIVE — SIGNIFICANT CHANGE UP
OXYCODONE UR-MCNC: NEGATIVE — SIGNIFICANT CHANGE UP
PCP UR-MCNC: NEGATIVE — SIGNIFICANT CHANGE UP
PLATELET # BLD AUTO: 227 K/UL — SIGNIFICANT CHANGE UP (ref 150–400)
PMV BLD: 11.5 FL — SIGNIFICANT CHANGE UP (ref 7–13)
POTASSIUM SERPL-MCNC: 3.4 MMOL/L — LOW (ref 3.5–5.3)
POTASSIUM SERPL-SCNC: 3.4 MMOL/L — LOW (ref 3.5–5.3)
PROT SERPL-MCNC: 7.7 G/DL — SIGNIFICANT CHANGE UP (ref 6–8.3)
RBC # BLD: 4.41 M/UL — SIGNIFICANT CHANGE UP (ref 3.8–5.2)
RBC # FLD: 12.7 % — SIGNIFICANT CHANGE UP (ref 10.3–14.5)
SALICYLATES SERPL-MCNC: < 5 MG/DL — LOW (ref 15–30)
SODIUM SERPL-SCNC: 142 MMOL/L — SIGNIFICANT CHANGE UP (ref 135–145)
WBC # BLD: 9.77 K/UL — SIGNIFICANT CHANGE UP (ref 3.8–10.5)
WBC # FLD AUTO: 9.77 K/UL — SIGNIFICANT CHANGE UP (ref 3.8–10.5)

## 2018-12-31 PROCEDURE — 99285 EMERGENCY DEPT VISIT HI MDM: CPT | Mod: 25

## 2018-12-31 PROCEDURE — 93010 ELECTROCARDIOGRAM REPORT: CPT | Mod: NC

## 2018-12-31 RX ORDER — ONDANSETRON 8 MG/1
4 TABLET, FILM COATED ORAL ONCE
Qty: 0 | Refills: 0 | Status: COMPLETED | OUTPATIENT
Start: 2018-12-31 | End: 2018-12-31

## 2018-12-31 RX ORDER — SODIUM CHLORIDE 9 MG/ML
1000 INJECTION INTRAMUSCULAR; INTRAVENOUS; SUBCUTANEOUS ONCE
Qty: 0 | Refills: 0 | Status: COMPLETED | OUTPATIENT
Start: 2018-12-31 | End: 2018-12-31

## 2018-12-31 RX ORDER — SODIUM CHLORIDE 9 MG/ML
500 INJECTION INTRAMUSCULAR; INTRAVENOUS; SUBCUTANEOUS
Qty: 0 | Refills: 0 | Status: DISCONTINUED | OUTPATIENT
Start: 2018-12-31 | End: 2019-01-04

## 2018-12-31 RX ADMIN — SODIUM CHLORIDE 1000 MILLILITER(S): 9 INJECTION INTRAMUSCULAR; INTRAVENOUS; SUBCUTANEOUS at 22:15

## 2018-12-31 RX ADMIN — SODIUM CHLORIDE 1000 MILLILITER(S): 9 INJECTION INTRAMUSCULAR; INTRAVENOUS; SUBCUTANEOUS at 21:44

## 2018-12-31 RX ADMIN — Medication 1 MILLIGRAM(S): at 20:10

## 2018-12-31 RX ADMIN — SODIUM CHLORIDE 1000 MILLILITER(S): 9 INJECTION INTRAMUSCULAR; INTRAVENOUS; SUBCUTANEOUS at 20:11

## 2018-12-31 NOTE — ED ADULT TRIAGE NOTE - CHIEF COMPLAINT QUOTE
bibems from home for multiple episodes of vomiting x 1 hour. c/o abdominal pain. hx anxiety and depression. unable to obtain all vitals because pt is moaning and writhing. 20g in left ac from ems with NS running and zofran.

## 2018-12-31 NOTE — ED ADULT NURSE NOTE - NSIMPLEMENTINTERV_GEN_ALL_ED
Implemented All Universal Safety Interventions:  [place] to call system. Call bell, personal items and telephone within reach. Instruct patient to call for assistance. Room bathroom lighting operational. Non-slip footwear when patient is off stretcher. Physically safe environment: no spills, clutter or unnecessary equipment. Stretcher in lowest position, wheels locked, appropriate side rails in place.

## 2018-12-31 NOTE — ED PROVIDER NOTE - ATTENDING CONTRIBUTION TO CARE
I agree with the above H&P.  Briefly this is a 23 year old female with agitation. found by parents concerned for possible overdose, previous overdose on fluoxetine.  will check tox labs, psych consults, basic labs, will check ekg for qt prolongation. reassess I agree with the above H&P.  Briefly this is a 23 year old female with agitation. found by parents concerned for possible overdose, previous overdose on fluoxetine.  will check tox labs, psych consults, basic labs, will check ekg for qt prolongation. reassess    CON : moderate distress  EENT : EOMI, MMM  NECK : Full ROM  RESP : CTAB no increased WOB  CARD : rrr no m/r/g  ABD : S NT ND NABS no rebound  EXT : No edema  NEURO : AAOX3

## 2018-12-31 NOTE — ED ADULT TRIAGE NOTE - BP NONINVASIVE SYSTOLIC (MM HG)
Unable to find daughter to translate. Patient resting at this time and appears comfortable.   VSS, 111

## 2018-12-31 NOTE — ED PROVIDER NOTE - MEDICAL DECISION MAKING DETAILS
possible acute anxiety episode, no evidence of toxidrome.  EKG reassuring.  Will observe, BZD for sx, labs, & reassess possible acute anxiety episode, no evidence of toxidrome. although weakness/lethargy & nl VS make BZD vs etoh a possibility. EKG reassuring.  Will observe, BZD for sx, labs, & reassess

## 2018-12-31 NOTE — ED PROVIDER NOTE - OBJECTIVE STATEMENT
23y F hx anxiety d/o on fluoxetine now BIBEMS (EMS report not available at time of ED exam) after being found in shower w/gen weakness & mult episodes of NBNB emesis.  Father (an MD) reports incr in social stresses recently, as confirmed by family members who st pt was endorsing significant anxious feelings immediately before she began vomiting.  Pt denies taking meds not as prescribed, EtOH, or other drugs.  Fam denies pt has access to other drugs at home, even from other family members.  EMS gave NS & zofran PTA.  Pt denies SI/HI/psychotic fx.

## 2018-12-31 NOTE — ED PROVIDER NOTE - PHYSICAL EXAMINATION
no clonus, rigidity  reflexes nl  no overt diaphoresis or skin dryness    EOMI, PERRLA 4mm b/l no clonus, rigidity  reflexes nl  no overt diaphoresis or skin dryness    GCS: E3, V4, M6    EOMI, PERRLA 5mm b/l

## 2018-12-31 NOTE — ED ADULT NURSE NOTE - OBJECTIVE STATEMENT
Pt w/ hx of anxiety received to rm 4 aaox4 ambulatory accompanied by father who is MD c/o anxiety attack w/ NV.  Per Father at bedside Pt possible overdose of duloxetine or ingestion of other drug.  Pt p/w nausea anxiety crying in bed in NAD breathing unlabored skin warm dry intact 20 g IV access obtained at r. labs and meds as ordered EKG in progress will endorse report to JEISON harp.

## 2019-01-01 VITALS
HEART RATE: 96 BPM | TEMPERATURE: 97 F | RESPIRATION RATE: 18 BRPM | OXYGEN SATURATION: 100 % | DIASTOLIC BLOOD PRESSURE: 51 MMHG | SYSTOLIC BLOOD PRESSURE: 96 MMHG

## 2019-01-01 DIAGNOSIS — F43.25 ADJUSTMENT DISORDER WITH MIXED DISTURBANCE OF EMOTIONS AND CONDUCT: ICD-10-CM

## 2019-01-01 DIAGNOSIS — F10.920 ALCOHOL USE, UNSPECIFIED WITH INTOXICATION, UNCOMPLICATED: ICD-10-CM

## 2019-01-01 DIAGNOSIS — R69 ILLNESS, UNSPECIFIED: ICD-10-CM

## 2019-01-01 DIAGNOSIS — F12.10 CANNABIS ABUSE, UNCOMPLICATED: ICD-10-CM

## 2019-01-01 PROBLEM — F32.9 MAJOR DEPRESSIVE DISORDER, SINGLE EPISODE, UNSPECIFIED: Chronic | Status: ACTIVE | Noted: 2018-06-28

## 2019-01-01 PROBLEM — F41.9 ANXIETY DISORDER, UNSPECIFIED: Chronic | Status: ACTIVE | Noted: 2018-06-28

## 2019-01-01 LAB
APPEARANCE UR: CLEAR — SIGNIFICANT CHANGE UP
BILIRUB UR-MCNC: NEGATIVE — SIGNIFICANT CHANGE UP
BLOOD UR QL VISUAL: NEGATIVE — SIGNIFICANT CHANGE UP
COLOR SPEC: SIGNIFICANT CHANGE UP
GLUCOSE UR-MCNC: NEGATIVE — SIGNIFICANT CHANGE UP
KETONES UR-MCNC: NEGATIVE — SIGNIFICANT CHANGE UP
LEUKOCYTE ESTERASE UR-ACNC: NEGATIVE — SIGNIFICANT CHANGE UP
NITRITE UR-MCNC: NEGATIVE — SIGNIFICANT CHANGE UP
PH UR: 7 — SIGNIFICANT CHANGE UP (ref 5–8)
PROT UR-MCNC: NEGATIVE — SIGNIFICANT CHANGE UP
SP GR SPEC: 1.01 — SIGNIFICANT CHANGE UP (ref 1–1.04)
UROBILINOGEN FLD QL: NORMAL — SIGNIFICANT CHANGE UP

## 2019-01-01 PROCEDURE — 99285 EMERGENCY DEPT VISIT HI MDM: CPT

## 2019-01-01 RX ADMIN — SODIUM CHLORIDE 70 MILLILITER(S): 9 INJECTION INTRAMUSCULAR; INTRAVENOUS; SUBCUTANEOUS at 00:57
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
## 2019-01-01 NOTE — ED BEHAVIORAL HEALTH ASSESSMENT NOTE - RISK ASSESSMENT
pt has a chronic elevated background risk given past psychiatric hospitalization and substance abuse. Protective factors- no SI/HI, mood stable, enrolled in school, future oriented, no hopelessness, no anhedonia, supportive family, good therapeutic alliance, compliant w tx. Risk is not acutely elevated and pt does not meet involuntary admission criteria.

## 2019-01-01 NOTE — ED BEHAVIORAL HEALTH ASSESSMENT NOTE - OTHER PAST PSYCHIATRIC HISTORY (INCLUDE DETAILS REGARDING ONSET, COURSE OF ILLNESS, INPATIENT/OUTPATIENT TREATMENT)
1 previous inpatient psychiatric admission [facility]  1 [facility] 4/1/18- 4/23/18 with paranoia possibly in context of cannabis and Adderall use , currently in treatment with Dr. Merrill at [facility], also attended CBT group in [facility]

## 2019-01-01 NOTE — ED BEHAVIORAL HEALTH ASSESSMENT NOTE - SUICIDE PROTECTIVE FACTORS
Supportive social network or family/Engaged in work or school/Future oriented/Fear of death or dying due to pain/suffering/Responsibility to family and others/Identifies reasons for living

## 2019-01-01 NOTE — ED BEHAVIORAL HEALTH ASSESSMENT NOTE - DESCRIPTION
calm, cooperative, well related  ICU Vital Signs Last 24 Hrs  T(C): 36.2 (01 Jan 2019 02:59), Max: 36.8 (31 Dec 2018 21:42)  T(F): 97.1 (01 Jan 2019 02:59), Max: 98.3 (31 Dec 2018 21:42)  HR: 96 (01 Jan 2019 02:59) (82 - 98)  BP: 96/51 (01 Jan 2019 02:59) (93/70 - 111/67)  BP(mean): --  ABP: --  ABP(mean): --  RR: 18 (01 Jan 2019 02:59) (18 - 18)  SpO2: 100% (01 Jan 2019 02:59) (100% - 100%) childhood asthma (grew out of it, not on medications currently) lives with family in [place], father is medical director of spine services at [employer], at times travels from aunt's house to college as it is closer

## 2019-02-08 PROCEDURE — 99214 OFFICE O/P EST MOD 30 MIN: CPT

## 2019-02-08 PROCEDURE — 90833 PSYTX W PT W E/M 30 MIN: CPT

## 2019-08-21 NOTE — ED PROVIDER NOTE - PMH
Box: HSC    2R 1 arm stand leg press 1R1B 1 arm stand leg [id number] 1 arm stand leg press            Trunk Stabilization 1RTrA variat: 90/90, leg lifts, scissor, knee ext   2R 90/90, leg lifts, scissor  No trunk flexion 1R1B 90/90, leg lifts, scissor, knee ext  1R1B flex, obl    1R1B 90/90, leg lifts, scissor, knee ext  1R1B flex, obl 1G 90/90, leg lifts, scissor, knee ext  1R1B flex, obl  SCAP STAB 1R1B 90/90, leg lifts, scissor, knee ext  1R1B flex, [id number] 90/90, leg lifts, scissor, knee ext  1R1B flex, obl                                 Hip Disassociation 2R1B ll,v,[place],ir/er,frog 2R1B ll,v,[place],ir/er,frog, U w/abd 2R1B ll,v,[place],ir/er,frog 2R1B ll,v,[place],ir/er,frog 2R1B ll,v,[place],ir/er,frog, U w/abd 2R1B ll,v,[place],ir/er,frog, U w/abd 2R1B ll,v,[place],ir/er,frog, U w/abd               2R stand leg press 1R1B stand leg press 1 arm                  Scapular Stabilization 1R1B seated bi tri, low row, shldr ext 1R1B 1R1B #5 1R1B #5   1R1B 6#      Upper trap stretch Shoulder wall stretch, IR stretch with towel Seated:2R1B row, biceps          1R1B RC, ext               Thoracic Mobility  2R Bridge from heels  2R Bridge from toes 2R Bridge from heels w/ext  2R Bridge from toes w/ext 2R Bridge from heels w/ext  2R Bridge from toes w/ext 2R Bridge from heels w/ext  2R Bridge from toes w/ext  2R Bridge from heels w/ext  2R Bridge from toes w/ext      Seated:2R1B row, biceps   1R1B RC, ext                              General ROM Hams,hip add,pirif,lunge, gastroc, soleus Hams,hip add,pirif,lunge    Shoulder IR stretch w/strap Hams,hip add,pirif,lunge Hams,hip add,pirif,lunge   Standing quad stretch    Hams,hip add,pirif,lunge   Standing quad stretch    Hams,hip add,pirif,lunge   Standing quad stretch    Hams,hip add,pirif,lunge   Standing quad stretch      1R mermaid         Standing quad stretch  1R mermaid                         Other   HEP Bridge, monster walks, wall sq,  standing quad str HEP Bridge, monster walks, wall sq,  standing quad str [place] stretch on wall [place] BECKER upper trap                                  Summary/Comments                                           Electronically signed by:  Cat Rodriguez ATC Anxiety    Depression 05-Mar-2019 19:00

## 2020-01-03 ENCOUNTER — EMERGENCY (EMERGENCY)
Facility: HOSPITAL | Age: 25
LOS: 1 days | Discharge: HOME | End: 2020-01-03
Attending: EMERGENCY MEDICINE | Admitting: EMERGENCY MEDICINE
Payer: COMMERCIAL

## 2020-01-03 VITALS
OXYGEN SATURATION: 100 % | SYSTOLIC BLOOD PRESSURE: 132 MMHG | TEMPERATURE: 100 F | HEART RATE: 102 BPM | RESPIRATION RATE: 18 BRPM | DIASTOLIC BLOOD PRESSURE: 77 MMHG

## 2020-01-03 LAB
ALBUMIN SERPL ELPH-MCNC: 4.2 G/DL — SIGNIFICANT CHANGE UP (ref 3.3–5)
ALP SERPL-CCNC: 55 U/L — SIGNIFICANT CHANGE UP (ref 40–120)
ALT FLD-CCNC: 23 U/L — SIGNIFICANT CHANGE UP (ref 4–33)
ANION GAP SERPL CALC-SCNC: 19 MMO/L — HIGH (ref 7–14)
AST SERPL-CCNC: 40 U/L — HIGH (ref 4–32)
BASE EXCESS BLDV CALC-SCNC: -0.4 MMOL/L — SIGNIFICANT CHANGE UP
BASOPHILS # BLD AUTO: 0.02 K/UL — SIGNIFICANT CHANGE UP (ref 0–0.2)
BASOPHILS NFR BLD AUTO: 0.3 % — SIGNIFICANT CHANGE UP (ref 0–2)
BILIRUB SERPL-MCNC: 0.2 MG/DL — SIGNIFICANT CHANGE UP (ref 0.2–1.2)
BLOOD GAS VENOUS - CREATININE: 0.71 MG/DL — SIGNIFICANT CHANGE UP (ref 0.5–1.3)
BLOOD GAS VENOUS - FIO2: 21 — SIGNIFICANT CHANGE UP
BUN SERPL-MCNC: 6 MG/DL — LOW (ref 7–23)
CALCIUM SERPL-MCNC: 8.9 MG/DL — SIGNIFICANT CHANGE UP (ref 8.4–10.5)
CHLORIDE BLDV-SCNC: 107 MMOL/L — SIGNIFICANT CHANGE UP (ref 96–108)
CHLORIDE SERPL-SCNC: 101 MMOL/L — SIGNIFICANT CHANGE UP (ref 98–107)
CK SERPL-CCNC: 56 U/L — SIGNIFICANT CHANGE UP (ref 25–170)
CO2 SERPL-SCNC: 19 MMOL/L — LOW (ref 22–31)
CREAT SERPL-MCNC: 0.71 MG/DL — SIGNIFICANT CHANGE UP (ref 0.5–1.3)
EOSINOPHIL # BLD AUTO: 0 K/UL — SIGNIFICANT CHANGE UP (ref 0–0.5)
EOSINOPHIL NFR BLD AUTO: 0 % — SIGNIFICANT CHANGE UP (ref 0–6)
FLU A RESULT: DETECTED — HIGH
FLU A RESULT: DETECTED — HIGH
FLUAV AG NPH QL: DETECTED — HIGH
FLUBV AG NPH QL: NOT DETECTED — SIGNIFICANT CHANGE UP
GAS PNL BLDV: 136 MMOL/L — SIGNIFICANT CHANGE UP (ref 136–146)
GLUCOSE BLDV-MCNC: 79 MG/DL — SIGNIFICANT CHANGE UP (ref 70–99)
GLUCOSE SERPL-MCNC: 79 MG/DL — SIGNIFICANT CHANGE UP (ref 70–99)
HCO3 BLDV-SCNC: 24 MMOL/L — SIGNIFICANT CHANGE UP (ref 20–27)
HCT VFR BLD CALC: 39.2 % — SIGNIFICANT CHANGE UP (ref 34.5–45)
HCT VFR BLDV CALC: 40.6 % — SIGNIFICANT CHANGE UP (ref 34.5–45)
HGB BLD-MCNC: 13.3 G/DL — SIGNIFICANT CHANGE UP (ref 11.5–15.5)
HGB BLDV-MCNC: 13.2 G/DL — SIGNIFICANT CHANGE UP (ref 11.5–15.5)
IMM GRANULOCYTES NFR BLD AUTO: 0.3 % — SIGNIFICANT CHANGE UP (ref 0–1.5)
LACTATE BLDV-MCNC: 1.6 MMOL/L — SIGNIFICANT CHANGE UP (ref 0.5–2)
LIDOCAIN IGE QN: 36.8 U/L — SIGNIFICANT CHANGE UP (ref 7–60)
LYMPHOCYTES # BLD AUTO: 1.7 K/UL — SIGNIFICANT CHANGE UP (ref 1–3.3)
LYMPHOCYTES # BLD AUTO: 23 % — SIGNIFICANT CHANGE UP (ref 13–44)
MCHC RBC-ENTMCNC: 31 PG — SIGNIFICANT CHANGE UP (ref 27–34)
MCHC RBC-ENTMCNC: 33.9 % — SIGNIFICANT CHANGE UP (ref 32–36)
MCV RBC AUTO: 91.4 FL — SIGNIFICANT CHANGE UP (ref 80–100)
MONOCYTES # BLD AUTO: 0.56 K/UL — SIGNIFICANT CHANGE UP (ref 0–0.9)
MONOCYTES NFR BLD AUTO: 7.6 % — SIGNIFICANT CHANGE UP (ref 2–14)
NEUTROPHILS # BLD AUTO: 5.09 K/UL — SIGNIFICANT CHANGE UP (ref 1.8–7.4)
NEUTROPHILS NFR BLD AUTO: 68.8 % — SIGNIFICANT CHANGE UP (ref 43–77)
NRBC # FLD: 0 K/UL — SIGNIFICANT CHANGE UP (ref 0–0)
PCO2 BLDV: 41 MMHG — SIGNIFICANT CHANGE UP (ref 41–51)
PH BLDV: 7.39 PH — SIGNIFICANT CHANGE UP (ref 7.32–7.43)
PLATELET # BLD AUTO: 181 K/UL — SIGNIFICANT CHANGE UP (ref 150–400)
PMV BLD: 11.6 FL — SIGNIFICANT CHANGE UP (ref 7–13)
PO2 BLDV: 44 MMHG — HIGH (ref 35–40)
POTASSIUM BLDV-SCNC: 3.5 MMOL/L — SIGNIFICANT CHANGE UP (ref 3.4–4.5)
POTASSIUM SERPL-MCNC: 4 MMOL/L — SIGNIFICANT CHANGE UP (ref 3.5–5.3)
POTASSIUM SERPL-SCNC: 4 MMOL/L — SIGNIFICANT CHANGE UP (ref 3.5–5.3)
PROT SERPL-MCNC: 7.8 G/DL — SIGNIFICANT CHANGE UP (ref 6–8.3)
RBC # BLD: 4.29 M/UL — SIGNIFICANT CHANGE UP (ref 3.8–5.2)
RBC # FLD: 12.3 % — SIGNIFICANT CHANGE UP (ref 10.3–14.5)
RSV RESULT: SIGNIFICANT CHANGE UP
RSV RNA RESP QL NAA+PROBE: SIGNIFICANT CHANGE UP
SAO2 % BLDV: 76.1 % — SIGNIFICANT CHANGE UP (ref 60–85)
SODIUM SERPL-SCNC: 139 MMOL/L — SIGNIFICANT CHANGE UP (ref 135–145)
WBC # BLD: 7.39 K/UL — SIGNIFICANT CHANGE UP (ref 3.8–10.5)
WBC # FLD AUTO: 7.39 K/UL — SIGNIFICANT CHANGE UP (ref 3.8–10.5)

## 2020-01-03 PROCEDURE — 71046 X-RAY EXAM CHEST 2 VIEWS: CPT | Mod: 26

## 2020-01-03 PROCEDURE — 99284 EMERGENCY DEPT VISIT MOD MDM: CPT

## 2020-01-03 RX ORDER — KETOROLAC TROMETHAMINE 30 MG/ML
30 SYRINGE (ML) INJECTION ONCE
Refills: 0 | Status: DISCONTINUED | OUTPATIENT
Start: 2020-01-03 | End: 2020-01-03

## 2020-01-03 RX ORDER — ONDANSETRON 8 MG/1
4 TABLET, FILM COATED ORAL ONCE
Refills: 0 | Status: COMPLETED | OUTPATIENT
Start: 2020-01-03 | End: 2020-01-03

## 2020-01-03 RX ORDER — SODIUM CHLORIDE 9 MG/ML
2000 INJECTION INTRAMUSCULAR; INTRAVENOUS; SUBCUTANEOUS ONCE
Refills: 0 | Status: COMPLETED | OUTPATIENT
Start: 2020-01-03 | End: 2020-01-03

## 2020-01-03 RX ORDER — FAMOTIDINE 10 MG/ML
20 INJECTION INTRAVENOUS ONCE
Refills: 0 | Status: COMPLETED | OUTPATIENT
Start: 2020-01-03 | End: 2020-01-03

## 2020-01-03 RX ADMIN — ONDANSETRON 4 MILLIGRAM(S): 8 TABLET, FILM COATED ORAL at 22:48

## 2020-01-03 RX ADMIN — SODIUM CHLORIDE 2000 MILLILITER(S): 9 INJECTION INTRAMUSCULAR; INTRAVENOUS; SUBCUTANEOUS at 22:48

## 2020-01-03 NOTE — ED PROVIDER NOTE - PROGRESS NOTE DETAILS
Postoperative Anesthesia Note    Name:    Keshawn Mckenzie  MRN:      [id number]    Patient Vitals for the past 12 hrs:   BP Temp Temp src Pulse Resp SpO2 Height Weight   03/10/18 1245 (!) 132/59 - - 71 15 99 % - -   03/10/18 1240 133/62 - - 63 11 99 % - -   03/10/18 1235 130/60 - - 70 13 98 % - -   03/10/18 1230 139/62 - - 71 9 99 % - -   03/10/18 1225 139/60 - - 76 10 100 % - -   03/10/18 1220 (!) 139/58 - - 65 11 98 % - -   03/10/18 1215 128/73 - - 68 12 97 % - -   03/10/18 1210 (!) 143/62 - - 68 10 98 % - -   03/10/18 1205 122/64 - - 74 10 99 % - -   03/10/18 1200 131/72 - - 71 12 97 % - -   03/10/18 1155 (!) 130/55 - - 75 11 98 % - -   03/10/18 1150 127/61 - - 65 9 99 % - -   03/10/18 1147 - 97 °F (36.1 °C) Temporal 75 10 98 % - -   03/10/18 1145 (!) 143/68 - - 82 12 100 % - -   03/10/18 1140 132/70 - - 76 12 (!) 84 % - -   03/10/18 1135 127/76 - - 75 9 98 % - -   03/10/18 1130 100/73 - - 82 12 98 % - -   03/10/18 1125 (!) 127/54 - - 81 14 98 % - -   03/10/18 1120 - - - - 13 - - -   03/10/18 1115 133/70 - - 83 12 99 % - -   03/10/18 1110 (!) 121/37 - - 92 19 100 % - -   03/10/18 1105 105/60 - - 99 23 100 % - -   03/10/18 1100 118/64 97.5 °F (36.4 °C) Temporal 100 16 99 % - -   03/10/18 0620 (!) 140/80 98.2 °F (36.8 °C) Temporal 79 16 99 % 5' 3\" (1.6 m) 155 lb (70.3 kg)        LABS:    CBC  No results found for: WBC, HGB, HCT, PLT  RENAL  No results found for: NA, K, CL, CO2, BUN, CREATININE, GLUCOSE  COAGS  No results found for: PROTIME, INR, APTT    Intake & Output: In: 1900 [I.V.:1900]  Out: 30 [Drains:30]    Nausea & Vomiting:  No    Level of Consciousness:  Awake    Pain Assessment:  Adequate analgesia    Anesthesia Complications:  No apparent anesthetic complications    SUMMARY      Vital signs stable  OK to discharge from Stage I post anesthesia care.   Care transferred from Anesthesiology department on discharge from perioperative area KRISHAN Peres: Received signout on pt, pt stable but unwell with frequent cough, runny nose and chills. Will have pt in CDU for supportive tx. KRISHAN Peres: Received signout on pt, pt stable but unwell with frequent cough, runny nose and chills. Will have pt in CDU for supportive tx. Discussed with pt's MD Daniel who agrees with plan.

## 2020-01-03 NOTE — ED ADULT NURSE NOTE - NSIMPLEMENTINTERV_GEN_ALL_ED
Implemented All Universal Safety Interventions:  [place] to call system. Call bell, personal items and telephone within reach. Instruct patient to call for assistance. Room bathroom lighting operational. Non-slip footwear when patient is off stretcher. Physically safe environment: no spills, clutter or unnecessary equipment. Stretcher in lowest position, wheels locked, appropriate side rails in place.

## 2020-01-03 NOTE — ED PROVIDER NOTE - OBJECTIVE STATEMENT
23yo female with no significant medical history c/o 3 days of nonproductive cough, nasal congestion, nausea and NBNB vomtiing. + abdominal cramping 2 days ago but one since. NO diarrhea. PT has been on Tamiflu at home for prophylaxis because her dad has the flu. No recent travel. No dysuria or flank pain. + fevers and chills- Tmax 101 at home. + myalgias

## 2020-01-03 NOTE — ED PROVIDER NOTE - CLINICAL SUMMARY MEDICAL DECISION MAKING FREE TEXT BOX
25 yo female with likely flu like illness. will obtain labs, CXR, treat symptomatically and reassess.

## 2020-01-03 NOTE — ED ADULT TRIAGE NOTE - CHIEF COMPLAINT QUOTE
c/o fever,  nausea and vomiting for the past 3 dyas, reports taking Tamiflu and ibuprofen with no relief, repots symptoms are getting progressively worse with inability to tolerate anything by mouth. Hx of anxiety, GERD.

## 2020-01-03 NOTE — ED PROVIDER NOTE - SHIFT CHANGE DETAILS
I have signed over this patient to the above attending physician. Pertinent history, physical exam findings and workup thus far in the ED have been discussed. The pending tests and plan, including labs and repeat vitals were signed over.  All questions from the above attending physician have been answered.

## 2020-01-03 NOTE — ED ADULT NURSE NOTE - OBJECTIVE STATEMENT
Received Pt in intake 12.  pt A&Ox3, ambulatory, family at bedside. Pt c/o fever,  nausea and vomiting for the past 3 days, reports taking Tamiflu and ibuprofen with no relief. States she has not been able to tolerate anything PO. Pt with history of anxiety, GERD. Pt appears stable and in no apparent distress. respirations are equal and nonlabored, no respiratory distress noted. Denies any other complaints. Denies chest pain, sob, diarrhea. 20 gauge IV placed in the right ac, labs sent. Pt medicated as per orders. IV fluids infusing. Pt stable, awaiting further plan

## 2020-01-04 VITALS
TEMPERATURE: 98 F | DIASTOLIC BLOOD PRESSURE: 70 MMHG | SYSTOLIC BLOOD PRESSURE: 109 MMHG | OXYGEN SATURATION: 100 % | RESPIRATION RATE: 14 BRPM | HEART RATE: 68 BPM

## 2020-01-04 LAB
APPEARANCE UR: CLEAR — SIGNIFICANT CHANGE UP
BILIRUB UR-MCNC: NEGATIVE — SIGNIFICANT CHANGE UP
BLOOD UR QL VISUAL: NEGATIVE — SIGNIFICANT CHANGE UP
COLOR SPEC: SIGNIFICANT CHANGE UP
GLUCOSE UR-MCNC: NEGATIVE — SIGNIFICANT CHANGE UP
HBA1C BLD-MCNC: 5.2 % — SIGNIFICANT CHANGE UP (ref 4–5.6)
KETONES UR-MCNC: HIGH
LEUKOCYTE ESTERASE UR-ACNC: NEGATIVE — SIGNIFICANT CHANGE UP
NITRITE UR-MCNC: NEGATIVE — SIGNIFICANT CHANGE UP
PH UR: 6.5 — SIGNIFICANT CHANGE UP (ref 5–8)
PROT UR-MCNC: NEGATIVE — SIGNIFICANT CHANGE UP
SP GR SPEC: 1.01 — SIGNIFICANT CHANGE UP (ref 1–1.04)
UROBILINOGEN FLD QL: NORMAL — SIGNIFICANT CHANGE UP

## 2020-01-04 PROCEDURE — 99236 HOSP IP/OBS SAME DATE HI 85: CPT

## 2020-01-04 RX ORDER — ONDANSETRON 8 MG/1
4 TABLET, FILM COATED ORAL ONCE
Refills: 0 | Status: COMPLETED | OUTPATIENT
Start: 2020-01-04 | End: 2020-01-04

## 2020-01-04 RX ORDER — METOCLOPRAMIDE HCL 10 MG
10 TABLET ORAL ONCE
Refills: 0 | Status: DISCONTINUED | OUTPATIENT
Start: 2020-01-04 | End: 2020-01-04

## 2020-01-04 RX ORDER — ACETAMINOPHEN 500 MG
975 TABLET ORAL ONCE
Refills: 0 | Status: COMPLETED | OUTPATIENT
Start: 2020-01-04 | End: 2020-01-04

## 2020-01-04 RX ORDER — IBUPROFEN 200 MG
1 TABLET ORAL
Qty: 40 | Refills: 0
Start: 2020-01-04 | End: 2020-01-13

## 2020-01-04 RX ORDER — SODIUM CHLORIDE 9 MG/ML
1000 INJECTION INTRAMUSCULAR; INTRAVENOUS; SUBCUTANEOUS
Refills: 0 | Status: DISCONTINUED | OUTPATIENT
Start: 2020-01-04 | End: 2020-01-09

## 2020-01-04 RX ADMIN — FAMOTIDINE 20 MILLIGRAM(S): 10 INJECTION INTRAVENOUS at 01:34

## 2020-01-04 RX ADMIN — Medication 975 MILLIGRAM(S): at 02:45

## 2020-01-04 RX ADMIN — Medication 975 MILLIGRAM(S): at 01:34

## 2020-01-04 RX ADMIN — SODIUM CHLORIDE 100 MILLILITER(S): 9 INJECTION INTRAMUSCULAR; INTRAVENOUS; SUBCUTANEOUS at 03:30

## 2020-01-04 RX ADMIN — ONDANSETRON 4 MILLIGRAM(S): 8 TABLET, FILM COATED ORAL at 01:34

## 2020-01-04 RX ADMIN — Medication 30 MILLIGRAM(S): at 01:34

## 2020-01-04 RX ADMIN — Medication 30 MILLIGRAM(S): at 02:45

## 2020-01-04 NOTE — ED CDU PROVIDER DISPOSITION NOTE - CARE PROVIDER_API CALL
Mainor Daniel)  Internal Medicine  [address] [place] [address]  Phone: [phone]  Fax: [phone]  Follow Up Time: 1-3 Days

## 2020-01-04 NOTE — ED CDU PROVIDER INITIAL DAY NOTE - OBJECTIVE STATEMENT
23 y/o female with no significant PMHx presents to the ER c/o 3 days of fevers, chills, cough and congestion.  Pt reports 2 days of nausea and vomiting.  Pt was given Tamiflu by a family member.  Pt denies abdominal pain, chest pain, shortness of breath, recent travel.

## 2020-01-04 NOTE — ED CDU PROVIDER DISPOSITION NOTE - CLINICAL COURSE
23 yo F a/w fever, chills and cough and congestion x 3 days with 2 days of nausea and vomiting after starting Father's Tamiflu.  Dx with Influenza A yesterday.  Pt denies abdominal pain, chest pain, shortness of breath, recent travel.  Patient seen and examined this AM.  No longer having any vomiting at this time.  GEN - NAD; well appearing; A+O x3; non-toxic appearing CARD -s1s2, RRR, no M,G,R; PULM - CTA b/l, symmetric breath sounds; ABD -  +BS, ND, NT, soft, no guarding, no rebound, no masses; BACK - no CVA tenderness, Normal  spine; EXT - symmetric pulses, 2+ dp, capillary refill < 2 seconds, no clubbing, no cyanosis, no edema; NEURO - no focal neuro deficits, no slurred speech.  Tolerated  PO trial this AM.  Will continue supportive care with motrin and tylenol and discharge home.

## 2020-01-04 NOTE — ED CDU PROVIDER DISPOSITION NOTE - PATIENT PORTAL LINK FT
You can access the FollowMyHealth Patient Portal offered by [facility] by registering at the following website: [email or web address]. By joining [employer]’s FollowMyHealth portal, you will also be able to view your health information using other applications (apps) compatible with our system.

## 2020-01-04 NOTE — CHART NOTE - NSCHARTNOTEFT_GEN_A_CORE
Pt seen in CDU    PMD:· Chief Complaint: The patient is a 24y Female complaining of fever.  · HPI Objective Statement: 25 y/o female with no significant PMHx presents to the ER c/o 3 days of fevers, chills, cough and congestion.  Pt's father, a physician, prescribed Amoxicillin, then Tamflu- she then developed nausea and vomiting.  .  Pt denies abdominal pain, chest pain, shortness of breath, recent travel.  Father with recent flu    Patient is a 24y old  Female who presents with a chief complaint of     INTERVAL HPI/OVERNIGHT EVENTS:    MEDICATIONS  (STANDING):  sodium chloride 0.9%. 1000 milliLiter(s) (100 mL/Hr) IV Continuous <Continuous>              Allergies    No Known Allergies    Intolerances        REVIEW OF SYSTEMS:  CARDIOVASCULAR: No chest pain, palpitations, dizziness, or leg swelling; no shortness of breath     RESPIRATORY: No cough, wheezing, chills or hemoptysis; No shortness of breath    GASTROINTESTINAL: No abdominal or epigastric pain. No nausea, vomiting, or hematemesis; No diarrhea or constipation. No melena or hematochezia.    NEUROLOGICAL: No headaches, memory loss, loss of strength, numbness      PHYSICAL EXAM:  Vital Signs Last 24 Hrs  T(C): 36.7 (04 Jan 2020 02:45), Max: 37.9 (04 Jan 2020 01:53)  T(F): 98.1 (04 Jan 2020 02:45), Max: 100.2 (04 Jan 2020 01:53)  HR: 74 (04 Jan 2020 02:45) (74 - 102)  BP: 103/63 (04 Jan 2020 02:45) (103/63 - 132/77)  BP(mean): --  RR: 16 (04 Jan 2020 02:45) (16 - 18)  SpO2: 98% (04 Jan 2020 02:45) (98% - 100%)    GENERAL: NAD, well-groomed, well-developed  HEAD:  Atraumatic, Normocephalic  EYES: EOMI, PERRLA, conjunctiva and sclera clear  NECK: Supple, No JVD, Normal thyroid  NERVOUS SYSTEM:  Alert & Oriented X3, Good concentration;  and symmetric  CHEST/LUNG: Clear to auscultation bilaterally; No rales, rhonchi, wheezing, or rubs  HEART: S1S2 regular, without murmur, rub nor gallop  ABDOMEN: Soft, Nontender, Nondistended; Bowel sounds present  EXTREMITIES:  2+ Peripheral Pulses, No clubbing, cyanosis, or edema      LABS:                        13.3   7.39  )-----------( 181      ( 03 Jan 2020 22:44 )             39.2     03 Jan 2020 22:44    139    |  101    |  6      ----------------------------<  79     4.0     |  19     |  0.71     Ca    8.9        03 Jan 2020 22:44    TPro  7.8    /  Alb  4.2    /  TBili  0.2    /  DBili  x      /  AST  40     /  ALT  23     /  AlkPhos  55     03 Jan 2020 22:44    FLU A B RSV Detection by PCR (01.03.20 @ 22:44)    Flu A Result: Detected    Flu B Result: Not Detected    RSV Result: Not Detected The Flu A B RSV assay is a Real-Time PCR test for the  qualitative detection and differentiation of Influenza A,  Influenza B, and Respiratory Syncytial Virus on  nasopharyngeal swabs.The results should be interpreted in  the context of all clinical and laboratory findings.          :        assessment:  pt seen in CDU, clinically improved.     Plan:   for d/c with outpatient follow up.    can d/c tamiflu    Care Discussed with Consultants/Other Providers: CDU PA

## 2020-01-04 NOTE — ED CDU PROVIDER INITIAL DAY NOTE - ATTENDING CONTRIBUTION TO CARE
Otoniel: I have seen and examined the patient face to face, have reviewed and addended the HPI, PE and a/p as necessary.    23 yo F a/w fever, chills and cough and congestion x 3 days with 2 days of nausea and vomiting after starting Father's Tamiflu.  Dx with Influenza A yesterday.  Pt denies abdominal pain, chest pain, shortness of breath, recent travel.  Patient seen and examined this AM.  No longer having any vomiting at this time.  GEN - NAD; well appearing; A+O x3; non-toxic appearing CARD -s1s2, RRR, no M,G,R; PULM - CTA b/l, symmetric breath sounds; ABD -  +BS, ND, NT, soft, no guarding, no rebound, no masses; BACK - no CVA tenderness, Normal  spine; EXT - symmetric pulses, 2+ dp, capillary refill < 2 seconds, no clubbing, no cyanosis, no edema; NEURO - no focal neuro deficits, no slurred speech Will PO trial this AM and continue supportive care with motrin and tylenol and discharge home.

## 2020-01-04 NOTE — ED CDU PROVIDER DISPOSITION NOTE - ATTENDING CONTRIBUTION TO CARE
Otoniel: I have seen and examined the patient face to face, have reviewed and addended the HPI, PE and a/p as necessary.     25 yo F a/w fever, chills and cough and congestion x 3 days with 2 days of nausea and vomiting after starting Father's Tamiflu.  Dx with Influenza A yesterday.  Pt denies abdominal pain, chest pain, shortness of breath, recent travel.  Patient seen and examined this AM.  No longer having any vomiting at this time.  GEN - NAD; well appearing; A+O x3; non-toxic appearing CARD -s1s2, RRR, no M,G,R; PULM - CTA b/l, symmetric breath sounds; ABD -  +BS, ND, NT, soft, no guarding, no rebound, no masses; BACK - no CVA tenderness, Normal  spine; EXT - symmetric pulses, 2+ dp, capillary refill < 2 seconds, no clubbing, no cyanosis, no edema; NEURO - no focal neuro deficits, no slurred speech.  Tolerated  PO trial this AM.  Will continue supportive care with motrin and tylenol and discharge home.

## 2020-01-04 NOTE — ED CDU PROVIDER DISPOSITION NOTE - PLAN OF CARE
Resolution of symptoms Follow up with your PMD Dr Mainor Daniel within 2-3 days. Call for an appointment - [phone].  Bring copies of your ER lab reports with you to MD appointment. Dtay hydrated and get planty of rest.  Do not take tamiflu any longer.  Take Tylenol 650mg every 4-6 hours as needed for pain. Or you may take Motrin 400mg every 8 hours as needed for moderate pain.  Return to ER if symptoms return or worsen or if other concerns arise.   Please follow up with your Primary MD in 24-48 hr. Follow up with your PMD Dr Mainor Daniel within 2-3 days. Call for an appointment - [phone].  Bring copies of your ER lab reports with you to MD appointment. Stay hydrated and get plenty of rest.  Do not take tamiflu any longer.  Take Tylenol 650mg every 4-6 hours as needed for pain. Or you may take Motrin 400mg every 8 hours as needed for moderate pain.  Return to ER if symptoms return or worsen or if other concerns arise.   Please follow up with your Primary MD in 24-48 hr.

## 2020-01-04 NOTE — ED CDU PROVIDER DISPOSITION NOTE - NSFOLLOWUPINSTRUCTIONS_ED_ALL_ED_FT
Follow up with your PMD Dr Mainor Daniel within 2-3 days. Call for an appointment - [phone].  Bring copies of your ER lab reports with you to MD appointment. Dtay hydrated and get planty of rest.  Do not take tamiflu any longer.  Take Tylenol 650mg every 4-6 hours as needed for pain. Or you may take Motrin 400mg every 8 hours as needed for moderate pain.  Return to ER if symptoms return or worsen or if other concerns arise. Follow up with your PMD Dr Mainor Daniel within 2-3 days. Call for an appointment - [phone].  Bring copies of your ER lab reports with you to MD appointment. Stay hydrated and get plenty of rest.  Do not take tamiflu any longer.  Take Tylenol 650mg every 4-6 hours as needed for pain. Or you may take Motrin 400mg every 8 hours as needed for moderate pain.  Return to ER if symptoms return or worsen or if other concerns arise.

## 2020-01-05 LAB
BACTERIA UR CULT: SIGNIFICANT CHANGE UP
SPECIMEN SOURCE: SIGNIFICANT CHANGE UP

## 2020-04-20 NOTE — ED ADULT TRIAGE NOTE - NS ED TRIAGE AVPU SCALE
Mom states patient didn't sleep well last night and said his stomach hurt-was pointing to appendix region. This am, patient said pain is around 12th rib region.  Mom states patient was doing backbends with sister yesterday so could have pulled something lance
Pt seems to have a temp (warm body pain below his belly buttom on the right side
Alert-The patient is alert, awake and responds to voice. The patient is oriented to time, place, and person. The triage nurse is able to obtain subjective information.

## 2020-05-29 NOTE — ED CDU PROVIDER INITIAL DAY NOTE - CPE EDP SKIN NORM
DVT PPx: SCD's. concern for possible slow bleed  Code Status: Pt is DNR, but NOT DNI, MOLST form in chart normal...

## 2020-10-11 ENCOUNTER — INPATIENT (INPATIENT)
Facility: HOSPITAL | Age: 25
LOS: 17 days | Discharge: HOME | End: 2020-10-29
Attending: STUDENT IN AN ORGANIZED HEALTH CARE EDUCATION/TRAINING PROGRAM | Admitting: PSYCHIATRY & NEUROLOGY
Payer: COMMERCIAL

## 2020-10-11 VITALS
RESPIRATION RATE: 18 BRPM | HEIGHT: 63 IN | SYSTOLIC BLOOD PRESSURE: 132 MMHG | HEART RATE: 103 BPM | TEMPERATURE: 97 F | OXYGEN SATURATION: 100 % | DIASTOLIC BLOOD PRESSURE: 100 MMHG

## 2020-10-11 DIAGNOSIS — F33.1 MAJOR DEPRESSIVE DISORDER, RECURRENT, MODERATE: ICD-10-CM

## 2020-10-11 DIAGNOSIS — F12.10 CANNABIS ABUSE, UNCOMPLICATED: ICD-10-CM

## 2020-10-11 DIAGNOSIS — R45.851 SUICIDAL IDEATIONS: ICD-10-CM

## 2020-10-11 LAB
ALBUMIN SERPL ELPH-MCNC: 5 G/DL — SIGNIFICANT CHANGE UP (ref 3.3–5)
ALP SERPL-CCNC: 45 U/L — SIGNIFICANT CHANGE UP (ref 40–120)
ALT FLD-CCNC: 8 U/L — SIGNIFICANT CHANGE UP (ref 4–33)
ANION GAP SERPL CALC-SCNC: 16 MMO/L — HIGH (ref 7–14)
APAP SERPL-MCNC: < 15 UG/ML — LOW (ref 15–25)
AST SERPL-CCNC: 15 U/L — SIGNIFICANT CHANGE UP (ref 4–32)
BASOPHILS # BLD AUTO: 0.07 K/UL — SIGNIFICANT CHANGE UP (ref 0–0.2)
BASOPHILS NFR BLD AUTO: 0.6 % — SIGNIFICANT CHANGE UP (ref 0–2)
BILIRUB SERPL-MCNC: 0.6 MG/DL — SIGNIFICANT CHANGE UP (ref 0.2–1.2)
BUN SERPL-MCNC: 16 MG/DL — SIGNIFICANT CHANGE UP (ref 7–23)
CALCIUM SERPL-MCNC: 10.2 MG/DL — SIGNIFICANT CHANGE UP (ref 8.4–10.5)
CHLORIDE SERPL-SCNC: 100 MMOL/L — SIGNIFICANT CHANGE UP (ref 98–107)
CO2 SERPL-SCNC: 24 MMOL/L — SIGNIFICANT CHANGE UP (ref 22–31)
CREAT SERPL-MCNC: 0.8 MG/DL — SIGNIFICANT CHANGE UP (ref 0.5–1.3)
EOSINOPHIL # BLD AUTO: 0.2 K/UL — SIGNIFICANT CHANGE UP (ref 0–0.5)
EOSINOPHIL NFR BLD AUTO: 1.7 % — SIGNIFICANT CHANGE UP (ref 0–6)
ETHANOL BLD-MCNC: < 10 MG/DL — SIGNIFICANT CHANGE UP
GLUCOSE SERPL-MCNC: 113 MG/DL — HIGH (ref 70–99)
HCG SERPL-ACNC: < 5 MIU/ML — SIGNIFICANT CHANGE UP
HCT VFR BLD CALC: 41.8 % — SIGNIFICANT CHANGE UP (ref 34.5–45)
HGB BLD-MCNC: 13.2 G/DL — SIGNIFICANT CHANGE UP (ref 11.5–15.5)
IMM GRANULOCYTES NFR BLD AUTO: 0.2 % — SIGNIFICANT CHANGE UP (ref 0–1.5)
LYMPHOCYTES # BLD AUTO: 4.91 K/UL — HIGH (ref 1–3.3)
LYMPHOCYTES # BLD AUTO: 41.1 % — SIGNIFICANT CHANGE UP (ref 13–44)
MCHC RBC-ENTMCNC: 30.1 PG — SIGNIFICANT CHANGE UP (ref 27–34)
MCHC RBC-ENTMCNC: 31.6 % — LOW (ref 32–36)
MCV RBC AUTO: 95.2 FL — SIGNIFICANT CHANGE UP (ref 80–100)
MONOCYTES # BLD AUTO: 1.28 K/UL — HIGH (ref 0–0.9)
MONOCYTES NFR BLD AUTO: 10.7 % — SIGNIFICANT CHANGE UP (ref 2–14)
NEUTROPHILS # BLD AUTO: 5.46 K/UL — SIGNIFICANT CHANGE UP (ref 1.8–7.4)
NEUTROPHILS NFR BLD AUTO: 45.7 % — SIGNIFICANT CHANGE UP (ref 43–77)
NRBC # FLD: 0 K/UL — SIGNIFICANT CHANGE UP (ref 0–0)
PLATELET # BLD AUTO: 283 K/UL — SIGNIFICANT CHANGE UP (ref 150–400)
PMV BLD: 11.1 FL — SIGNIFICANT CHANGE UP (ref 7–13)
POTASSIUM SERPL-MCNC: 3.8 MMOL/L — SIGNIFICANT CHANGE UP (ref 3.5–5.3)
POTASSIUM SERPL-SCNC: 3.8 MMOL/L — SIGNIFICANT CHANGE UP (ref 3.5–5.3)
PROT SERPL-MCNC: 8.5 G/DL — HIGH (ref 6–8.3)
RBC # BLD: 4.39 M/UL — SIGNIFICANT CHANGE UP (ref 3.8–5.2)
RBC # FLD: 11.9 % — SIGNIFICANT CHANGE UP (ref 10.3–14.5)
SALICYLATES SERPL-MCNC: < 5 MG/DL — LOW (ref 15–30)
SARS-COV-2 RNA SPEC QL NAA+PROBE: SIGNIFICANT CHANGE UP
SODIUM SERPL-SCNC: 140 MMOL/L — SIGNIFICANT CHANGE UP (ref 135–145)
TSH SERPL-MCNC: 3.29 UIU/ML — SIGNIFICANT CHANGE UP (ref 0.27–4.2)
WBC # BLD: 11.94 K/UL — HIGH (ref 3.8–10.5)
WBC # FLD AUTO: 11.94 K/UL — HIGH (ref 3.8–10.5)

## 2020-10-11 PROCEDURE — 99285 EMERGENCY DEPT VISIT HI MDM: CPT

## 2020-10-11 RX ORDER — ARIPIPRAZOLE 15 MG/1
2 TABLET ORAL DAILY
Refills: 0 | Status: DISCONTINUED | OUTPATIENT
Start: 2020-10-11 | End: 2020-10-13

## 2020-10-11 RX ORDER — ESCITALOPRAM OXALATE 10 MG/1
5 TABLET, FILM COATED ORAL DAILY
Refills: 0 | Status: DISCONTINUED | OUTPATIENT
Start: 2020-10-11 | End: 2020-10-13

## 2020-10-11 RX ORDER — HYDROXYZINE HCL 10 MG
25 TABLET ORAL EVERY 12 HOURS
Refills: 0 | Status: DISCONTINUED | OUTPATIENT
Start: 2020-10-11 | End: 2020-10-29

## 2020-10-11 RX ADMIN — Medication 1 MILLIGRAM(S): at 13:17

## 2020-10-11 RX ADMIN — ARIPIPRAZOLE 2 MILLIGRAM(S): 15 TABLET ORAL at 13:17

## 2020-10-11 RX ADMIN — ESCITALOPRAM OXALATE 5 MILLIGRAM(S): 10 TABLET, FILM COATED ORAL at 13:17
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
## 2020-10-11 NOTE — ED ADULT NURSE REASSESSMENT NOTE - NS ED NURSE REASSESS COMMENT FT1
Pt remains awake, sitting up in bed, NAD. Pt appears depressed. Labs drawn, covid swab collected. Results and psych eval pending. Will continue to monitor for safety.

## 2020-10-11 NOTE — ED BEHAVIORAL HEALTH ASSESSMENT NOTE - IMPULSE CONTROL
----- Message from Nick Renee MD sent at 4/19/2017  7:46 AM CDT -----  Call, xray negative. Schedule fu appt if desired.   Normal

## 2020-10-11 NOTE — ED ADULT NURSE NOTE - OBJECTIVE STATEMENT
Break coverage- Pt received into . Pt c/o having increased anxiety over the past 2 weeks with thoughts of wanting to hurt herself. Pt states she got into a verbal argument with her parents and stated "everyone would be better of without me." Pt denies HI. Denies A/V hallucinations. Pt states she has been compliant with her klonopin medication. Labs drawn and sent. Psych MD at bedside for evaluation. Needs are met, safety maintained. no acute distress. Awaiting further plan of care.

## 2020-10-11 NOTE — ED BEHAVIORAL HEALTH ASSESSMENT NOTE - SUICIDE PROTECTIVE FACTORS
Responsibility to family and others/Supportive social network of family or friends/Fear of death or the actual act of killing self/Has future plans/Identifies reasons for living/Engaged in work or school Identifies reasons for living/Has future plans/Supportive social network of family or friends/Fear of death or the actual act of killing self/Engaged in work or school/Responsibility to family and others/Positive therapeutic relationships

## 2020-10-11 NOTE — ED PROVIDER NOTE - PHYSICAL EXAMINATION
Gen: Alert, NAD  Head: NC, AT,  EOMI, normal lids/conjunctiva  ENT:  normal hearing, patent oropharynx without erythema/exudate  Neck: +supple, no tenderness/meningismus/JVD, +Trachea midline  Chest: no chest wall tenderness, equal chest rise  Pulm: Bilateral BS, normal resp effort, no wheeze/stridor/retractions  CV: RRR, no M/R/G, +dist pulses  Abd: +BS, soft, NT/ND  Rectal: deferred  Mskel: no edema/erythema/cyanosis  Skin: no rash  Neuro: AAOx3  psych: guarded, slightly anxious appearing; endorses si; denies HI/AVH

## 2020-10-11 NOTE — ED BEHAVIORAL HEALTH ASSESSMENT NOTE - OTHER PAST PSYCHIATRIC HISTORY (INCLUDE DETAILS REGARDING ONSET, COURSE OF ILLNESS, INPATIENT/OUTPATIENT TREATMENT)
1 previous inpatient psychiatric admission [facility]  1 [facility] 4/1/18- 4/23/18 with paranoia possibly in context of cannabis and Adderall use , currently in treatment with Dr. Merrill at [facility], also attended CBT group in [facility] inpatient psychiatric admissions:  [facility]  1 [facility] 4/1/19- 4/23/19 with paranoia possibly in context of cannabis and Adderall use , currently in treatment with Dr. Merrill at [facility], also attended CBT group in [facility]

## 2020-10-11 NOTE — ED BEHAVIORAL HEALTH ASSESSMENT NOTE - ACTIVATING EVENTS/STRESSORS
Triggering events leading to humiliation, shame, and/or despair (e.g. Loss of relationship, financial or health status) (real or anticipated) Change in provider or treatment (i.e., medications, psychotherapy, milieu)

## 2020-10-11 NOTE — ED PROVIDER NOTE - CLINICAL SUMMARY MEDICAL DECISION MAKING FREE TEXT BOX
26yo F w/ PPH as above here for anxiety and SI. psych labs and  consult. Called pt's father for collateral, no answer. Discussed with Dr. Caraballo of psychiatry.
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
## 2020-10-11 NOTE — ED PROVIDER NOTE - OBJECTIVE STATEMENT
Pertinent PMH/PSH/FHx/SHx and Review of Systems contained within:  26yo F, denies pmh, PPH of anxiety and PTSD (1 prior psych hospitalization at [facility] for passive SI), domiciled w/ parents (father is an attending physician at [employer]), bib father for anxiety and suicidal thoughts. Pt reports that she began feeling more anxious 2 wks ago (won't say why) and last night had argument with parents, "tried to escape from 1st floor window" and did have thoughts of "not wanting to be here anymore and that everyone would be better off without me", but won't elaborate on whether or not she had specific thoughts of hurting herself. States last etoh and marijuana both weeks ago. Denies any other illicit drug use. Denies HI/AVH. No physical complaints. Endorses taking her prescribed klonopin but denies any intentional OD or co-ingestion.  No fever/chills, No photophobia/eye pain/changes in vision, No ear pain/sore throat/dysphagia, No chest pain/palpitations, no SOB/cough/wheeze/stridor, No abdominal pain, No N/V/D, no dysuria/frequency/discharge, No neck/back pain, no rash, no new focal neuro symptoms.
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
## 2020-10-11 NOTE — ED BEHAVIORAL HEALTH ASSESSMENT NOTE - PRIMARY DX
Adjustment disorder with mixed disturbance of emotions and conduct Deferred condition on axis II Moderate episode of recurrent major depressive disorder

## 2020-10-11 NOTE — ED ADULT TRIAGE NOTE - CHIEF COMPLAINT QUOTE
psych eval    brought in by father who is a physician at [employer], dr randolph arndt [phone]/ [phone]. pt ran out of Clonazepam 4-5 days ago...  was not supposed to run out for another week or more..  may have been accidentally doubled dosing.  father spoke to psychiatrist, dr martinez, who prescribed seroquel.  pt took 1 dose on friday and last night at 8pm. pt became very anxious approx 45mins afterwards, was involved in argument with parents and tried to leave the house.  pt denies homicidal ideation but had passive suicidal thoughts yesterday.  denies any suicidal plan or any attempts in the past.  denies auditory- visual hallucinations, drugs or etoh. feels "clustered" in the head,  unable to sleep for a couple of days, appetite is poor.

## 2020-10-11 NOTE — ED PROVIDER NOTE - PROGRESS NOTE DETAILS
Dr. Lynch: pt signed out to me at 8 AM from Dr. Hernandez with plan to admit to [facility]; awaiting urine but pt medically cleared; I spoke to pt and she is calm, has no acute complaints, is aware that she will be admitted

## 2020-10-11 NOTE — ED ADULT NURSE NOTE - CHIEF COMPLAINT QUOTE
psych eval    brought in by father who is a physician at [employer], dr randolph arndt [phone]/ [phone]. pt ran out of Clonazepam 4-5 days ago...  was not supposed to run out for another week or more..  may have been accidentally doubled dosing.  father spoke to psychiatrist, dr martinez, who prescribed seroquel.  pt took 1 dose on friday and last night at 8pm. pt became very anxious approx 45mins afterwards, was involved in argument with parents and tried to leave the house.  pt denies homicidal ideation but had passive suicidal thoughts yesterday.  denies any suicidal plan or any attempts in the past.  denies auditory- visual hallucinations, drugs or etoh. feels "clustered" in the head,  unable to sleep for a couple of days, appetite is poor.

## 2020-10-11 NOTE — ED BEHAVIORAL HEALTH ASSESSMENT NOTE - DESCRIPTION (FIRST USE, LAST USE, QUANTITY, FREQUENCY, DURATION)
intermittently for past few months as per father, three times in past few months Serv[place], history, no recent use

## 2020-10-11 NOTE — ED BEHAVIORAL HEALTH ASSESSMENT NOTE - SUICIDE RISK FACTORS
History of abuse/trauma/Alcohol/Substance abuse disorders Alcohol/Substance abuse disorders/Insomnia/Mood Disorder current/past/History of abuse/trauma/Psychotic disorder current/past

## 2020-10-11 NOTE — ED ADULT NURSE REASSESSMENT NOTE - NS ED NURSE REASSESS COMMENT FT1
0815 Received report from night RN pt lying on bed in nad c/o depression denies si/hi/avh presently made aware of admission to [facility] 3.

## 2020-10-11 NOTE — ED ADULT NURSE NOTE - PRO INTERPRETER NEED 2
Immediate Operative Summary


Operative Date


May 4, 2017.





Pre-Operative Diagnosis





1) TWIN PREGNANCY





2) RUPTURE OF MEMBRANES


3) bREECH/VTX PRESENTATION





Post-Operative Diagnosis





SAME AS ABOVE





Procedure(s) Performed





PRIMARY CAESAREAN SECTION FOR TWIN GESTATION











BABY [id number]





BABY B 0315





Surgeon


DR COTA





Assistant Surgeon(s)


DR MARTIN





Beckman to gravity





Anesthesia


Spinal





Complication(s)


None





Disposition


L&D
English

## 2020-10-11 NOTE — ED ADULT NURSE NOTE - NSIMPLEMENTINTERV_GEN_ALL_ED
Implemented All Universal Safety Interventions:  [place] to call system. Call bell, personal items and telephone within reach. Instruct patient to call for assistance. Room bathroom lighting operational. Non-slip footwear when patient is off stretcher. Physically safe environment: no spills, clutter or unnecessary equipment. Stretcher in lowest position, wheels locked, appropriate side rails in place.

## 2020-10-12 PROCEDURE — 99221 1ST HOSP IP/OBS SF/LOW 40: CPT

## 2020-10-12 RX ORDER — HALOPERIDOL DECANOATE 100 MG/ML
2 INJECTION INTRAMUSCULAR ONCE
Refills: 0 | Status: DISCONTINUED | OUTPATIENT
Start: 2020-10-12 | End: 2020-10-29

## 2020-10-12 RX ORDER — HALOPERIDOL DECANOATE 100 MG/ML
2 INJECTION INTRAMUSCULAR EVERY 4 HOURS
Refills: 0 | Status: DISCONTINUED | OUTPATIENT
Start: 2020-10-12 | End: 2020-10-29

## 2020-10-12 RX ADMIN — ARIPIPRAZOLE 2 MILLIGRAM(S): 15 TABLET ORAL at 08:33

## 2020-10-12 RX ADMIN — ESCITALOPRAM OXALATE 5 MILLIGRAM(S): 10 TABLET, FILM COATED ORAL at 08:33

## 2020-10-12 RX ADMIN — Medication 0.5 MILLIGRAM(S): at 21:10

## 2020-10-13 LAB
BASOPHILS # BLD AUTO: 0.04 K/UL — SIGNIFICANT CHANGE UP (ref 0–0.2)
BASOPHILS NFR BLD AUTO: 0.5 % — SIGNIFICANT CHANGE UP (ref 0–2)
EOSINOPHIL # BLD AUTO: 0.25 K/UL — SIGNIFICANT CHANGE UP (ref 0–0.5)
EOSINOPHIL NFR BLD AUTO: 2.9 % — SIGNIFICANT CHANGE UP (ref 0–6)
HCT VFR BLD CALC: 40 % — SIGNIFICANT CHANGE UP (ref 34.5–45)
HGB BLD-MCNC: 13.2 G/DL — SIGNIFICANT CHANGE UP (ref 11.5–15.5)
IMM GRANULOCYTES NFR BLD AUTO: 0.2 % — SIGNIFICANT CHANGE UP (ref 0–1.5)
LYMPHOCYTES # BLD AUTO: 3.43 K/UL — HIGH (ref 1–3.3)
LYMPHOCYTES # BLD AUTO: 39.5 % — SIGNIFICANT CHANGE UP (ref 13–44)
MCHC RBC-ENTMCNC: 30.7 PG — SIGNIFICANT CHANGE UP (ref 27–34)
MCHC RBC-ENTMCNC: 33 % — SIGNIFICANT CHANGE UP (ref 32–36)
MCV RBC AUTO: 93 FL — SIGNIFICANT CHANGE UP (ref 80–100)
MONOCYTES # BLD AUTO: 0.95 K/UL — HIGH (ref 0–0.9)
MONOCYTES NFR BLD AUTO: 10.9 % — SIGNIFICANT CHANGE UP (ref 2–14)
NEUTROPHILS # BLD AUTO: 3.99 K/UL — SIGNIFICANT CHANGE UP (ref 1.8–7.4)
NEUTROPHILS NFR BLD AUTO: 46 % — SIGNIFICANT CHANGE UP (ref 43–77)
NRBC # FLD: 0 K/UL — SIGNIFICANT CHANGE UP (ref 0–0)
PLATELET # BLD AUTO: 256 K/UL — SIGNIFICANT CHANGE UP (ref 150–400)
PMV BLD: 11 FL — SIGNIFICANT CHANGE UP (ref 7–13)
RBC # BLD: 4.3 M/UL — SIGNIFICANT CHANGE UP (ref 3.8–5.2)
RBC # FLD: 11.5 % — SIGNIFICANT CHANGE UP (ref 10.3–14.5)
WBC # BLD: 8.68 K/UL — SIGNIFICANT CHANGE UP (ref 3.8–10.5)
WBC # FLD AUTO: 8.68 K/UL — SIGNIFICANT CHANGE UP (ref 3.8–10.5)

## 2020-10-13 PROCEDURE — 99232 SBSQ HOSP IP/OBS MODERATE 35: CPT | Mod: GC

## 2020-10-13 RX ORDER — ESCITALOPRAM OXALATE 10 MG/1
10 TABLET, FILM COATED ORAL DAILY
Refills: 0 | Status: DISCONTINUED | OUTPATIENT
Start: 2020-10-14 | End: 2020-10-15

## 2020-10-13 RX ORDER — MIRTAZAPINE 45 MG/1
7.5 TABLET, ORALLY DISINTEGRATING ORAL AT BEDTIME
Refills: 0 | Status: DISCONTINUED | OUTPATIENT
Start: 2020-10-13 | End: 2020-10-13

## 2020-10-13 RX ORDER — ARIPIPRAZOLE 15 MG/1
5 TABLET ORAL DAILY
Refills: 0 | Status: DISCONTINUED | OUTPATIENT
Start: 2020-10-14 | End: 2020-10-15

## 2020-10-13 RX ADMIN — Medication 0.5 MILLIGRAM(S): at 08:56

## 2020-10-13 RX ADMIN — ESCITALOPRAM OXALATE 5 MILLIGRAM(S): 10 TABLET, FILM COATED ORAL at 08:56

## 2020-10-13 RX ADMIN — ARIPIPRAZOLE 2 MILLIGRAM(S): 15 TABLET ORAL at 08:56

## 2020-10-13 RX ADMIN — Medication 0.5 MILLIGRAM(S): at 20:25

## 2020-10-13 RX ADMIN — Medication 0.5 MILLIGRAM(S): at 12:20

## 2020-10-14 PROCEDURE — 99231 SBSQ HOSP IP/OBS SF/LOW 25: CPT | Mod: GC

## 2020-10-14 RX ADMIN — Medication 0.5 MILLIGRAM(S): at 11:02

## 2020-10-14 RX ADMIN — ESCITALOPRAM OXALATE 10 MILLIGRAM(S): 10 TABLET, FILM COATED ORAL at 11:02

## 2020-10-14 RX ADMIN — Medication 0.5 MILLIGRAM(S): at 21:43

## 2020-10-14 RX ADMIN — ARIPIPRAZOLE 5 MILLIGRAM(S): 15 TABLET ORAL at 11:02

## 2020-10-15 PROCEDURE — 99232 SBSQ HOSP IP/OBS MODERATE 35: CPT | Mod: GC

## 2020-10-15 RX ORDER — ARIPIPRAZOLE 15 MG/1
5 TABLET ORAL AT BEDTIME
Refills: 0 | Status: DISCONTINUED | OUTPATIENT
Start: 2020-10-16 | End: 2020-10-20

## 2020-10-15 RX ORDER — ESCITALOPRAM OXALATE 10 MG/1
10 TABLET, FILM COATED ORAL AT BEDTIME
Refills: 0 | Status: DISCONTINUED | OUTPATIENT
Start: 2020-10-16 | End: 2020-10-19

## 2020-10-15 RX ADMIN — ARIPIPRAZOLE 5 MILLIGRAM(S): 15 TABLET ORAL at 08:37

## 2020-10-15 RX ADMIN — Medication 0.5 MILLIGRAM(S): at 21:53

## 2020-10-15 RX ADMIN — ESCITALOPRAM OXALATE 10 MILLIGRAM(S): 10 TABLET, FILM COATED ORAL at 08:37

## 2020-10-16 LAB
ALBUMIN SERPL ELPH-MCNC: 4.2 G/DL — SIGNIFICANT CHANGE UP (ref 3.3–5)
ALP SERPL-CCNC: 44 U/L — SIGNIFICANT CHANGE UP (ref 40–120)
ALT FLD-CCNC: 7 U/L — SIGNIFICANT CHANGE UP (ref 4–33)
ANION GAP SERPL CALC-SCNC: 8 MMO/L — SIGNIFICANT CHANGE UP (ref 7–14)
AST SERPL-CCNC: 10 U/L — SIGNIFICANT CHANGE UP (ref 4–32)
BILIRUB SERPL-MCNC: 0.2 MG/DL — SIGNIFICANT CHANGE UP (ref 0.2–1.2)
BUN SERPL-MCNC: 14 MG/DL — SIGNIFICANT CHANGE UP (ref 7–23)
CALCIUM SERPL-MCNC: 9.1 MG/DL — SIGNIFICANT CHANGE UP (ref 8.4–10.5)
CHLORIDE SERPL-SCNC: 103 MMOL/L — SIGNIFICANT CHANGE UP (ref 98–107)
CO2 SERPL-SCNC: 31 MMOL/L — SIGNIFICANT CHANGE UP (ref 22–31)
CREAT SERPL-MCNC: 0.72 MG/DL — SIGNIFICANT CHANGE UP (ref 0.5–1.3)
GLUCOSE SERPL-MCNC: 90 MG/DL — SIGNIFICANT CHANGE UP (ref 70–99)
MAGNESIUM SERPL-MCNC: 2.3 MG/DL — SIGNIFICANT CHANGE UP (ref 1.6–2.6)
PHOSPHATE SERPL-MCNC: 4.4 MG/DL — SIGNIFICANT CHANGE UP (ref 2.5–4.5)
POTASSIUM SERPL-MCNC: 4 MMOL/L — SIGNIFICANT CHANGE UP (ref 3.5–5.3)
POTASSIUM SERPL-SCNC: 4 MMOL/L — SIGNIFICANT CHANGE UP (ref 3.5–5.3)
PROT SERPL-MCNC: 6.8 G/DL — SIGNIFICANT CHANGE UP (ref 6–8.3)
SODIUM SERPL-SCNC: 142 MMOL/L — SIGNIFICANT CHANGE UP (ref 135–145)

## 2020-10-16 PROCEDURE — 99232 SBSQ HOSP IP/OBS MODERATE 35: CPT | Mod: GC

## 2020-10-16 RX ADMIN — Medication 1 MILLIGRAM(S): at 12:15

## 2020-10-16 RX ADMIN — ESCITALOPRAM OXALATE 10 MILLIGRAM(S): 10 TABLET, FILM COATED ORAL at 21:33

## 2020-10-16 RX ADMIN — Medication 0.5 MILLIGRAM(S): at 21:33

## 2020-10-16 RX ADMIN — ARIPIPRAZOLE 5 MILLIGRAM(S): 15 TABLET ORAL at 21:33

## 2020-10-17 PROCEDURE — 99231 SBSQ HOSP IP/OBS SF/LOW 25: CPT

## 2020-10-17 RX ADMIN — ARIPIPRAZOLE 5 MILLIGRAM(S): 15 TABLET ORAL at 21:26

## 2020-10-17 RX ADMIN — Medication 0.5 MILLIGRAM(S): at 21:26

## 2020-10-17 RX ADMIN — Medication 0.5 MILLIGRAM(S): at 09:40

## 2020-10-17 RX ADMIN — ESCITALOPRAM OXALATE 10 MILLIGRAM(S): 10 TABLET, FILM COATED ORAL at 21:26

## 2020-10-18 RX ADMIN — ARIPIPRAZOLE 5 MILLIGRAM(S): 15 TABLET ORAL at 20:59

## 2020-10-18 RX ADMIN — ESCITALOPRAM OXALATE 10 MILLIGRAM(S): 10 TABLET, FILM COATED ORAL at 20:59

## 2020-10-18 RX ADMIN — Medication 0.5 MILLIGRAM(S): at 09:26

## 2020-10-18 RX ADMIN — Medication 0.5 MILLIGRAM(S): at 20:59

## 2020-10-18 RX ADMIN — Medication 0.5 MILLIGRAM(S): at 12:39

## 2020-10-19 PROCEDURE — 99232 SBSQ HOSP IP/OBS MODERATE 35: CPT | Mod: GC

## 2020-10-19 PROCEDURE — 90832 PSYTX W PT 30 MINUTES: CPT

## 2020-10-19 RX ORDER — ESCITALOPRAM OXALATE 10 MG/1
15 TABLET, FILM COATED ORAL AT BEDTIME
Refills: 0 | Status: DISCONTINUED | OUTPATIENT
Start: 2020-10-19 | End: 2020-10-21

## 2020-10-19 RX ADMIN — ESCITALOPRAM OXALATE 15 MILLIGRAM(S): 10 TABLET, FILM COATED ORAL at 21:11

## 2020-10-19 RX ADMIN — Medication 0.5 MILLIGRAM(S): at 21:11

## 2020-10-19 RX ADMIN — Medication 0.5 MILLIGRAM(S): at 13:00

## 2020-10-19 RX ADMIN — Medication 0.5 MILLIGRAM(S): at 08:57

## 2020-10-19 RX ADMIN — ARIPIPRAZOLE 5 MILLIGRAM(S): 15 TABLET ORAL at 21:11

## 2020-10-20 PROCEDURE — 99231 SBSQ HOSP IP/OBS SF/LOW 25: CPT | Mod: GC

## 2020-10-20 RX ORDER — CLONAZEPAM 1 MG
0.5 TABLET ORAL
Refills: 0 | Status: DISCONTINUED | OUTPATIENT
Start: 2020-10-20 | End: 2020-10-20

## 2020-10-20 RX ORDER — CLONAZEPAM 1 MG
0.5 TABLET ORAL ONCE
Refills: 0 | Status: DISCONTINUED | OUTPATIENT
Start: 2020-10-20 | End: 2020-10-20

## 2020-10-20 RX ORDER — CLONAZEPAM 1 MG
0.5 TABLET ORAL THREE TIMES A DAY
Refills: 0 | Status: DISCONTINUED | OUTPATIENT
Start: 2020-10-20 | End: 2020-10-26

## 2020-10-20 RX ORDER — ARIPIPRAZOLE 15 MG/1
10 TABLET ORAL AT BEDTIME
Refills: 0 | Status: DISCONTINUED | OUTPATIENT
Start: 2020-10-20 | End: 2020-10-29

## 2020-10-20 RX ADMIN — ARIPIPRAZOLE 10 MILLIGRAM(S): 15 TABLET ORAL at 21:33

## 2020-10-20 RX ADMIN — Medication 0.5 MILLIGRAM(S): at 08:46

## 2020-10-20 RX ADMIN — Medication 0.5 MILLIGRAM(S): at 21:33

## 2020-10-20 RX ADMIN — Medication 0.5 MILLIGRAM(S): at 14:35

## 2020-10-20 RX ADMIN — ESCITALOPRAM OXALATE 15 MILLIGRAM(S): 10 TABLET, FILM COATED ORAL at 21:33

## 2020-10-21 PROCEDURE — 99232 SBSQ HOSP IP/OBS MODERATE 35: CPT | Mod: GC

## 2020-10-21 RX ORDER — ONDANSETRON 8 MG/1
4 TABLET, FILM COATED ORAL ONCE
Refills: 0 | Status: COMPLETED | OUTPATIENT
Start: 2020-10-21 | End: 2020-10-21

## 2020-10-21 RX ORDER — ESCITALOPRAM OXALATE 10 MG/1
20 TABLET, FILM COATED ORAL AT BEDTIME
Refills: 0 | Status: DISCONTINUED | OUTPATIENT
Start: 2020-10-21 | End: 2020-10-29

## 2020-10-21 RX ADMIN — Medication 0.5 MILLIGRAM(S): at 21:21

## 2020-10-21 RX ADMIN — ESCITALOPRAM OXALATE 20 MILLIGRAM(S): 10 TABLET, FILM COATED ORAL at 21:21

## 2020-10-21 RX ADMIN — Medication 0.5 MILLIGRAM(S): at 12:43

## 2020-10-21 RX ADMIN — ONDANSETRON 4 MILLIGRAM(S): 8 TABLET, FILM COATED ORAL at 20:00

## 2020-10-21 RX ADMIN — ARIPIPRAZOLE 10 MILLIGRAM(S): 15 TABLET ORAL at 21:21

## 2020-10-21 RX ADMIN — Medication 0.5 MILLIGRAM(S): at 08:30

## 2020-10-22 PROCEDURE — 99232 SBSQ HOSP IP/OBS MODERATE 35: CPT

## 2020-10-22 RX ADMIN — Medication 0.5 MILLIGRAM(S): at 13:46

## 2020-10-22 RX ADMIN — ESCITALOPRAM OXALATE 20 MILLIGRAM(S): 10 TABLET, FILM COATED ORAL at 20:58

## 2020-10-22 RX ADMIN — Medication 0.5 MILLIGRAM(S): at 20:58

## 2020-10-22 RX ADMIN — Medication 0.5 MILLIGRAM(S): at 08:41

## 2020-10-22 RX ADMIN — ARIPIPRAZOLE 10 MILLIGRAM(S): 15 TABLET ORAL at 20:58

## 2020-10-23 PROCEDURE — 99231 SBSQ HOSP IP/OBS SF/LOW 25: CPT | Mod: GC

## 2020-10-23 PROCEDURE — 90853 GROUP PSYCHOTHERAPY: CPT

## 2020-10-23 RX ADMIN — Medication 0.5 MILLIGRAM(S): at 13:06

## 2020-10-23 RX ADMIN — Medication 0.5 MILLIGRAM(S): at 08:52

## 2020-10-23 RX ADMIN — Medication 0.5 MILLIGRAM(S): at 21:33

## 2020-10-23 RX ADMIN — ARIPIPRAZOLE 10 MILLIGRAM(S): 15 TABLET ORAL at 21:33

## 2020-10-23 RX ADMIN — ESCITALOPRAM OXALATE 20 MILLIGRAM(S): 10 TABLET, FILM COATED ORAL at 21:33

## 2020-10-24 RX ADMIN — ARIPIPRAZOLE 10 MILLIGRAM(S): 15 TABLET ORAL at 21:12

## 2020-10-24 RX ADMIN — Medication 0.5 MILLIGRAM(S): at 21:12

## 2020-10-24 RX ADMIN — Medication 0.5 MILLIGRAM(S): at 12:48

## 2020-10-24 RX ADMIN — ESCITALOPRAM OXALATE 20 MILLIGRAM(S): 10 TABLET, FILM COATED ORAL at 21:12

## 2020-10-24 RX ADMIN — Medication 0.5 MILLIGRAM(S): at 09:10

## 2020-10-25 RX ADMIN — ESCITALOPRAM OXALATE 20 MILLIGRAM(S): 10 TABLET, FILM COATED ORAL at 20:39

## 2020-10-25 RX ADMIN — Medication 0.5 MILLIGRAM(S): at 20:39

## 2020-10-25 RX ADMIN — Medication 0.5 MILLIGRAM(S): at 12:57

## 2020-10-25 RX ADMIN — Medication 0.5 MILLIGRAM(S): at 08:48

## 2020-10-25 RX ADMIN — ARIPIPRAZOLE 10 MILLIGRAM(S): 15 TABLET ORAL at 20:39

## 2020-10-26 PROCEDURE — 99231 SBSQ HOSP IP/OBS SF/LOW 25: CPT | Mod: 25,GC

## 2020-10-26 PROCEDURE — 90853 GROUP PSYCHOTHERAPY: CPT

## 2020-10-26 RX ORDER — CLONAZEPAM 1 MG
0.5 TABLET ORAL ONCE
Refills: 0 | Status: DISCONTINUED | OUTPATIENT
Start: 2020-10-26 | End: 2020-10-26

## 2020-10-26 RX ORDER — CLONAZEPAM 1 MG
0.5 TABLET ORAL
Refills: 0 | Status: DISCONTINUED | OUTPATIENT
Start: 2020-10-26 | End: 2020-10-29

## 2020-10-26 RX ADMIN — Medication 0.5 MILLIGRAM(S): at 21:48

## 2020-10-26 RX ADMIN — Medication 0.5 MILLIGRAM(S): at 09:17

## 2020-10-26 RX ADMIN — ARIPIPRAZOLE 10 MILLIGRAM(S): 15 TABLET ORAL at 21:48

## 2020-10-26 RX ADMIN — ESCITALOPRAM OXALATE 20 MILLIGRAM(S): 10 TABLET, FILM COATED ORAL at 21:48

## 2020-10-26 RX ADMIN — Medication 0.5 MILLIGRAM(S): at 15:23

## 2020-10-27 PROCEDURE — 90853 GROUP PSYCHOTHERAPY: CPT

## 2020-10-27 PROCEDURE — 99231 SBSQ HOSP IP/OBS SF/LOW 25: CPT | Mod: GC

## 2020-10-27 RX ADMIN — ESCITALOPRAM OXALATE 20 MILLIGRAM(S): 10 TABLET, FILM COATED ORAL at 21:45

## 2020-10-27 RX ADMIN — ARIPIPRAZOLE 10 MILLIGRAM(S): 15 TABLET ORAL at 21:45

## 2020-10-27 RX ADMIN — Medication 0.5 MILLIGRAM(S): at 08:49

## 2020-10-27 RX ADMIN — Medication 0.5 MILLIGRAM(S): at 21:45

## 2020-10-28 PROCEDURE — 99232 SBSQ HOSP IP/OBS MODERATE 35: CPT | Mod: GC

## 2020-10-28 RX ORDER — ESCITALOPRAM OXALATE 10 MG/1
1 TABLET, FILM COATED ORAL
Qty: 15 | Refills: 0
Start: 2020-10-28 | End: 2020-11-11

## 2020-10-28 RX ORDER — CLONAZEPAM 1 MG
1 TABLET ORAL
Qty: 28 | Refills: 0
Start: 2020-10-28 | End: 2020-11-10

## 2020-10-28 RX ORDER — ARIPIPRAZOLE 15 MG/1
1 TABLET ORAL
Qty: 15 | Refills: 0
Start: 2020-10-28 | End: 2020-11-11

## 2020-10-28 RX ADMIN — Medication 0.5 MILLIGRAM(S): at 21:00

## 2020-10-28 RX ADMIN — Medication 0.5 MILLIGRAM(S): at 09:00

## 2020-10-28 RX ADMIN — ESCITALOPRAM OXALATE 20 MILLIGRAM(S): 10 TABLET, FILM COATED ORAL at 21:00

## 2020-10-28 RX ADMIN — ARIPIPRAZOLE 10 MILLIGRAM(S): 15 TABLET ORAL at 21:00

## 2020-10-29 ENCOUNTER — OUTPATIENT (OUTPATIENT)
Dept: OUTPATIENT SERVICES | Facility: HOSPITAL | Age: 25
LOS: 1 days | Discharge: HOME | End: 2020-10-29
Payer: COMMERCIAL

## 2020-10-29 VITALS — TEMPERATURE: 98 F | HEART RATE: 99 BPM | DIASTOLIC BLOOD PRESSURE: 64 MMHG | SYSTOLIC BLOOD PRESSURE: 110 MMHG

## 2020-10-29 DIAGNOSIS — F33.9 MAJOR DEPRESSIVE DISORDER, RECURRENT, UNSPECIFIED: ICD-10-CM

## 2020-10-29 PROCEDURE — 99239 HOSP IP/OBS DSCHRG MGMT >30: CPT | Mod: GC

## 2020-10-29 RX ADMIN — Medication 0.5 MILLIGRAM(S): at 09:16

## 2020-11-19 ENCOUNTER — OUTPATIENT (OUTPATIENT)
Dept: OUTPATIENT SERVICES | Facility: HOSPITAL | Age: 25
LOS: 1 days | Discharge: HOME | End: 2020-11-19
Payer: COMMERCIAL

## 2020-12-11 DIAGNOSIS — F33.3 MAJOR DEPRESSIVE DISORDER, RECURRENT, SEVERE WITH PSYCHOTIC SYMPTOMS: ICD-10-CM

## 2020-12-11 DIAGNOSIS — F15.959 OTHER STIMULANT USE, UNSPECIFIED WITH STIMULANT-INDUCED PSYCHOTIC DISORDER, UNSPECIFIED: ICD-10-CM

## 2021-01-08 PROCEDURE — 90853 GROUP PSYCHOTHERAPY: CPT | Mod: 95

## 2021-01-08 PROCEDURE — 99214 OFFICE O/P EST MOD 30 MIN: CPT | Mod: 95

## 2021-01-13 PROCEDURE — 90853 GROUP PSYCHOTHERAPY: CPT | Mod: 95

## 2021-01-19 PROCEDURE — 90853 GROUP PSYCHOTHERAPY: CPT | Mod: 95

## 2021-01-20 PROCEDURE — 90853 GROUP PSYCHOTHERAPY: CPT | Mod: 95

## 2021-01-23 NOTE — ED BEHAVIORAL HEALTH ASSESSMENT NOTE - ACCOMPANIED BY
Patient Education     Corneal Abrasion    You have received a scratch or scrape (abrasion) to your cornea. The cornea is the clear part in the front of the eye. This sensitive area is very painful when injured. You may make tears frequently, and your vision may be blurry until the injury heals. You may be sensitive to light.  This part of the body heals quickly. You can expect the pain to go away within 24 to 48 hours. If the abrasion is large or deep, your doctor may apply an eye patch, although this is not always done. An antibiotic ointment or eye drops may also be used to prevent infection.  Numbing drops may be used to relieve the pain temporarily so that your eyes can be examined. But these drops can’t be prescribed for home use because that would prevent healing and lead to more serious problems. Also, if you can’t feel your eye, there is a chance of accidentally injuring it further without knowing it.  Home care  · A cold pack may be applied over the eye (or eye patch) for 20 minutes at a time, to reduce pain. To make a cold pack, put ice cubes in a plastic bag that seals at the top. Wrap the bag in a clean, thin towel or cloth.  · You may use acetaminophen or ibuprofen to control pain, unless another pain medicine was prescribed. Note: If you have chronic liver or kidney disease or have ever had a stomach ulcer or GI bleeding, talk with your doctor before using these medicines.  · Rest your eyes and don’t read until symptoms are gone.  · If you use contact lenses, don’t wear them until all symptoms are gone.  · If your vision is affected by the corneal abrasion or if an eye patch was applied, don’t drive a motor vehicle or operate machinery until all symptoms are gone. You may have trouble judging distances using only one eye.  · If your eyes are sensitive to light, try wearing sunglasses, or stay indoors until symptoms go away.  Follow-up care  Follow up with your healthcare provider, or as advised.  · If  no patch was put on your eye and the pain continues for more than 48 hours, you should have another exam. Contact your healthcare provider to arrange this.  · If your eye was patched and you were asked to remove the patch yourself, see your healthcare provider. Contact your healthcare provider if you still have pain after the patch is removed.  · If you were given a return appointment for patch removal and re-examination, be sure to keep the appointment. Leaving the patch in place longer than advised could be harmful.  When to seek medical advice  Call your healthcare provider right away if any of these occur.  · Increasing eye pain or pain that does not improve after 24 hours  · Discharge from the eye  · Increasing redness of the eye or swelling of the eyelids  · Worsening vision  · Symptoms get worse after the abrasion has healed  Date Last Reviewed: 7/1/2017  © 5033-3712 [employer]. [address] [place] [address], [place] [address]. All rights reserved. This information is not intended as a substitute for professional medical care. Always follow your healthcare professional's instructions.            Self Self/Family member

## 2021-02-08 PROCEDURE — 99214 OFFICE O/P EST MOD 30 MIN: CPT | Mod: 95

## 2021-02-17 PROCEDURE — 90853 GROUP PSYCHOTHERAPY: CPT | Mod: 95

## 2021-03-03 PROCEDURE — 99214 OFFICE O/P EST MOD 30 MIN: CPT | Mod: 95

## 2021-03-26 PROCEDURE — 99214 OFFICE O/P EST MOD 30 MIN: CPT | Mod: 95

## 2021-04-20 PROCEDURE — 99214 OFFICE O/P EST MOD 30 MIN: CPT | Mod: 95

## 2021-04-20 NOTE — ED ADULT TRIAGE NOTE - CADM TRG TX PRIOR TO ARRIVAL
[FreeTextEntry1] : 72-year-old male history of colon polyps due for surveillance\par Family history of colon cancer noted\par \par Plan\par Indications risks benefits and alternatives to colonoscopy reviewed with patient\par He is agreeable to examination
none

## 2021-05-06 PROCEDURE — 99214 OFFICE O/P EST MOD 30 MIN: CPT | Mod: 95

## 2021-06-14 PROCEDURE — 99214 OFFICE O/P EST MOD 30 MIN: CPT | Mod: 95

## 2021-07-12 PROCEDURE — 99214 OFFICE O/P EST MOD 30 MIN: CPT | Mod: 95

## 2021-08-11 ENCOUNTER — INPATIENT (INPATIENT)
Facility: HOSPITAL | Age: 26
LOS: 47 days | Discharge: HOME | End: 2021-09-28
Attending: STUDENT IN AN ORGANIZED HEALTH CARE EDUCATION/TRAINING PROGRAM | Admitting: PSYCHIATRY & NEUROLOGY
Payer: COMMERCIAL

## 2021-08-11 VITALS
DIASTOLIC BLOOD PRESSURE: 84 MMHG | OXYGEN SATURATION: 100 % | SYSTOLIC BLOOD PRESSURE: 136 MMHG | TEMPERATURE: 98 F | RESPIRATION RATE: 18 BRPM | HEIGHT: 63 IN | HEART RATE: 96 BPM

## 2021-08-11 DIAGNOSIS — F41.9 ANXIETY DISORDER, UNSPECIFIED: ICD-10-CM

## 2021-08-11 DIAGNOSIS — F15.10 OTHER STIMULANT ABUSE, UNCOMPLICATED: ICD-10-CM

## 2021-08-11 DIAGNOSIS — F12.10 CANNABIS ABUSE, UNCOMPLICATED: ICD-10-CM

## 2021-08-11 DIAGNOSIS — F33.3 MAJOR DEPRESSIVE DISORDER, RECURRENT, SEVERE WITH PSYCHOTIC SYMPTOMS: ICD-10-CM

## 2021-08-11 DIAGNOSIS — F33.9 MAJOR DEPRESSIVE DISORDER, RECURRENT, UNSPECIFIED: ICD-10-CM

## 2021-08-11 LAB
ALBUMIN SERPL ELPH-MCNC: 4.7 G/DL — SIGNIFICANT CHANGE UP (ref 3.3–5)
ALP SERPL-CCNC: 56 U/L — SIGNIFICANT CHANGE UP (ref 40–120)
ALT FLD-CCNC: 8 U/L — SIGNIFICANT CHANGE UP (ref 4–33)
AMPHET UR-MCNC: NEGATIVE — SIGNIFICANT CHANGE UP
ANION GAP SERPL CALC-SCNC: 14 MMOL/L — SIGNIFICANT CHANGE UP (ref 7–14)
APAP SERPL-MCNC: <15 UG/ML — SIGNIFICANT CHANGE UP (ref 15–25)
APPEARANCE UR: CLEAR — SIGNIFICANT CHANGE UP
AST SERPL-CCNC: 12 U/L — SIGNIFICANT CHANGE UP (ref 4–32)
BACTERIA # UR AUTO: NEGATIVE — SIGNIFICANT CHANGE UP
BARBITURATES UR SCN-MCNC: NEGATIVE — SIGNIFICANT CHANGE UP
BASOPHILS # BLD AUTO: 0.03 K/UL — SIGNIFICANT CHANGE UP (ref 0–0.2)
BASOPHILS NFR BLD AUTO: 0.3 % — SIGNIFICANT CHANGE UP (ref 0–2)
BENZODIAZ UR-MCNC: NEGATIVE — SIGNIFICANT CHANGE UP
BILIRUB SERPL-MCNC: 0.3 MG/DL — SIGNIFICANT CHANGE UP (ref 0.2–1.2)
BILIRUB UR-MCNC: NEGATIVE — SIGNIFICANT CHANGE UP
BUN SERPL-MCNC: 9 MG/DL — SIGNIFICANT CHANGE UP (ref 7–23)
CALCIUM SERPL-MCNC: 9.5 MG/DL — SIGNIFICANT CHANGE UP (ref 8.4–10.5)
CHLORIDE SERPL-SCNC: 103 MMOL/L — SIGNIFICANT CHANGE UP (ref 98–107)
CO2 SERPL-SCNC: 23 MMOL/L — SIGNIFICANT CHANGE UP (ref 22–31)
COCAINE METAB.OTHER UR-MCNC: NEGATIVE — SIGNIFICANT CHANGE UP
COLOR SPEC: YELLOW — SIGNIFICANT CHANGE UP
COVID-19 SPIKE DOMAIN AB INTERP: POSITIVE
COVID-19 SPIKE DOMAIN ANTIBODY RESULT: >250 U/ML — HIGH
CREAT SERPL-MCNC: 0.59 MG/DL — SIGNIFICANT CHANGE UP (ref 0.5–1.3)
CREATININE URINE RESULT, DAU: 180 MG/DL — SIGNIFICANT CHANGE UP
DIFF PNL FLD: NEGATIVE — SIGNIFICANT CHANGE UP
EOSINOPHIL # BLD AUTO: 0.03 K/UL — SIGNIFICANT CHANGE UP (ref 0–0.5)
EOSINOPHIL NFR BLD AUTO: 0.3 % — SIGNIFICANT CHANGE UP (ref 0–6)
EPI CELLS # UR: 4 /HPF — SIGNIFICANT CHANGE UP (ref 0–5)
ETHANOL SERPL-MCNC: <10 MG/DL — SIGNIFICANT CHANGE UP
GLUCOSE SERPL-MCNC: 90 MG/DL — SIGNIFICANT CHANGE UP (ref 70–99)
GLUCOSE UR QL: NEGATIVE — SIGNIFICANT CHANGE UP
HCG SERPL-ACNC: <5 MIU/ML — SIGNIFICANT CHANGE UP
HCT VFR BLD CALC: 37 % — SIGNIFICANT CHANGE UP (ref 34.5–45)
HGB BLD-MCNC: 12 G/DL — SIGNIFICANT CHANGE UP (ref 11.5–15.5)
HYALINE CASTS # UR AUTO: 2 /LPF — SIGNIFICANT CHANGE UP (ref 0–7)
IANC: 6.28 K/UL — SIGNIFICANT CHANGE UP (ref 1.5–8.5)
IMM GRANULOCYTES NFR BLD AUTO: 0.3 % — SIGNIFICANT CHANGE UP (ref 0–1.5)
KETONES UR-MCNC: NEGATIVE — SIGNIFICANT CHANGE UP
LEUKOCYTE ESTERASE UR-ACNC: NEGATIVE — SIGNIFICANT CHANGE UP
LYMPHOCYTES # BLD AUTO: 2.05 K/UL — SIGNIFICANT CHANGE UP (ref 1–3.3)
LYMPHOCYTES # BLD AUTO: 22.3 % — SIGNIFICANT CHANGE UP (ref 13–44)
MCHC RBC-ENTMCNC: 29.7 PG — SIGNIFICANT CHANGE UP (ref 27–34)
MCHC RBC-ENTMCNC: 32.4 GM/DL — SIGNIFICANT CHANGE UP (ref 32–36)
MCV RBC AUTO: 91.6 FL — SIGNIFICANT CHANGE UP (ref 80–100)
METHADONE UR-MCNC: NEGATIVE — SIGNIFICANT CHANGE UP
MONOCYTES # BLD AUTO: 0.79 K/UL — SIGNIFICANT CHANGE UP (ref 0–0.9)
MONOCYTES NFR BLD AUTO: 8.6 % — SIGNIFICANT CHANGE UP (ref 2–14)
NEUTROPHILS # BLD AUTO: 6.28 K/UL — SIGNIFICANT CHANGE UP (ref 1.8–7.4)
NEUTROPHILS NFR BLD AUTO: 68.2 % — SIGNIFICANT CHANGE UP (ref 43–77)
NITRITE UR-MCNC: NEGATIVE — SIGNIFICANT CHANGE UP
NRBC # BLD: 0 /100 WBCS — SIGNIFICANT CHANGE UP
NRBC # FLD: 0 K/UL — SIGNIFICANT CHANGE UP
OPIATES UR-MCNC: NEGATIVE — SIGNIFICANT CHANGE UP
OXYCODONE UR-MCNC: NEGATIVE — SIGNIFICANT CHANGE UP
PCP SPEC-MCNC: SIGNIFICANT CHANGE UP
PCP UR-MCNC: NEGATIVE — SIGNIFICANT CHANGE UP
PH UR: 6.5 — SIGNIFICANT CHANGE UP (ref 5–8)
PLATELET # BLD AUTO: 251 K/UL — SIGNIFICANT CHANGE UP (ref 150–400)
POTASSIUM SERPL-MCNC: 3.4 MMOL/L — LOW (ref 3.5–5.3)
POTASSIUM SERPL-SCNC: 3.4 MMOL/L — LOW (ref 3.5–5.3)
PROT SERPL-MCNC: 7.9 G/DL — SIGNIFICANT CHANGE UP (ref 6–8.3)
PROT UR-MCNC: ABNORMAL
RBC # BLD: 4.04 M/UL — SIGNIFICANT CHANGE UP (ref 3.8–5.2)
RBC # FLD: 13.8 % — SIGNIFICANT CHANGE UP (ref 10.3–14.5)
RBC CASTS # UR COMP ASSIST: 4 /HPF — SIGNIFICANT CHANGE UP (ref 0–4)
SALICYLATES SERPL-MCNC: <5 MG/DL — LOW (ref 15–30)
SARS-COV-2 IGG+IGM SERPL QL IA: >250 U/ML — HIGH
SARS-COV-2 IGG+IGM SERPL QL IA: POSITIVE
SARS-COV-2 RNA SPEC QL NAA+PROBE: SIGNIFICANT CHANGE UP
SODIUM SERPL-SCNC: 140 MMOL/L — SIGNIFICANT CHANGE UP (ref 135–145)
SP GR SPEC: 1.03 — HIGH (ref 1.01–1.02)
THC UR QL: POSITIVE
TOXICOLOGY SCREEN, DRUGS OF ABUSE, SERUM RESULT: SIGNIFICANT CHANGE UP
TSH SERPL-MCNC: 0.68 UIU/ML — SIGNIFICANT CHANGE UP (ref 0.27–4.2)
UROBILINOGEN FLD QL: SIGNIFICANT CHANGE UP
WBC # BLD: 9.21 K/UL — SIGNIFICANT CHANGE UP (ref 3.8–10.5)
WBC # FLD AUTO: 9.21 K/UL — SIGNIFICANT CHANGE UP (ref 3.8–10.5)
WBC UR QL: 7 /HPF — HIGH (ref 0–5)

## 2021-08-11 PROCEDURE — 99285 EMERGENCY DEPT VISIT HI MDM: CPT

## 2021-08-11 PROCEDURE — 99284 EMERGENCY DEPT VISIT MOD MDM: CPT | Mod: 25

## 2021-08-11 PROCEDURE — 93010 ELECTROCARDIOGRAM REPORT: CPT

## 2021-08-11 RX ORDER — QUETIAPINE FUMARATE 200 MG/1
25 TABLET, FILM COATED ORAL AT BEDTIME
Refills: 0 | Status: DISCONTINUED | OUTPATIENT
Start: 2021-08-11 | End: 2021-08-16

## 2021-08-11 RX ORDER — HALOPERIDOL DECANOATE 100 MG/ML
2 INJECTION INTRAMUSCULAR EVERY 6 HOURS
Refills: 0 | Status: DISCONTINUED | OUTPATIENT
Start: 2021-08-11 | End: 2021-09-28

## 2021-08-11 RX ORDER — ESCITALOPRAM OXALATE 10 MG/1
10 TABLET, FILM COATED ORAL DAILY
Refills: 0 | Status: DISCONTINUED | OUTPATIENT
Start: 2021-08-11 | End: 2021-09-28

## 2021-08-11 RX ORDER — HALOPERIDOL DECANOATE 100 MG/ML
5 INJECTION INTRAMUSCULAR ONCE
Refills: 0 | Status: DISCONTINUED | OUTPATIENT
Start: 2021-08-11 | End: 2021-09-28

## 2021-08-11 RX ADMIN — Medication 1 MILLIGRAM(S): at 14:23

## 2021-08-11 RX ADMIN — Medication 1 MILLIGRAM(S): at 21:11

## 2021-08-11 RX ADMIN — QUETIAPINE FUMARATE 25 MILLIGRAM(S): 200 TABLET, FILM COATED ORAL at 21:11

## 2021-08-11 NOTE — ED BEHAVIORAL HEALTH ASSESSMENT NOTE - DESCRIPTION
lives with family in [place], father is medical director of spine services at [employer], at times travels from aunt's house to college as it is closer childhood asthma (grew out of it, not on medications currently) Pt is quiet, tearful.  Vital Signs Last 24 Hrs  T(C): 36.7 (11 Aug 2021 13:07), Max: 36.7 (11 Aug 2021 13:07)  T(F): 98 (11 Aug 2021 13:07), Max: 98 (11 Aug 2021 13:07)  HR: 96 (11 Aug 2021 13:07) (96 - 96)  BP: 136/84 (11 Aug 2021 13:07) (136/84 - 136/84)  BP(mean): --  RR: 18 (11 Aug 2021 13:07) (18 - 18)  SpO2: 100% (11 Aug 2021 13:07) (100% - 100%)

## 2021-08-11 NOTE — ED BEHAVIORAL HEALTH ASSESSMENT NOTE - OTHER PAST PSYCHIATRIC HISTORY (INCLUDE DETAILS REGARDING ONSET, COURSE OF ILLNESS, INPATIENT/OUTPATIENT TREATMENT)
inpatient psychiatric admissions:  [address] 4/1/19- 4/23/19 and 10/11-10/29 with paranoia possibly in context of cannabis and Adderall use , currently in treatment with Dr. Merrill at [facility], also attended CBT group in Centers inpatient psychiatric admissions:  [address] 4/1/19- 4/23/19 and 10/11-10/29 with paranoia possibly in context of cannabis and Adderall use , currently in treatment with Dr. Merrill at [facility], also attended CBT group in [facility]    IStop # [id number] Klonopin 0.5 bid #15 5/2021

## 2021-08-11 NOTE — ED BEHAVIORAL HEALTH NOTE - BEHAVIORAL HEALTH NOTE
COVID Exposure Screen- Patient-UTO  1.	*Have you had a COVID-19 test in the last 90 days?  (  ) Yes   (  ) No   (  ) Unknown- Reason: _____  IF YES PROCEED TO QUESTION #2. IF NO OR UNKNOWN, PLEASE SKIP TO QUESTION #3.  2.	Date of test(s) and result(s): ________  3.	*Have you tested positive for COVID-19 antibodies? (  ) Yes   (  ) No   (  ) Unknown- Reason: _____  IF YES PROCEED TO QUESTION #4. IF NO or UNKNOWN, PLEASE SKIP TO QUESTION #5.  4.	Date of positive antibody test: ________  5.	*Have you received 2 doses of the COVID-19 vaccine? (  ) Yes   (  ) No   (  ) Unknown- Reason: _____   IF YES PROCEED TO QUESTION #6. IF NO or UNKNOWN, PLEASE SKIP TO QUESTION #7.  6.	Date of second dose: ________  7.	*In the past 10 days, have you been around anyone with a positive COVID-19 test?* (  ) Yes   (  ) No   (  ) Unknown- Reason: ____  IF YES PROCEED TO QUESTION #8. IF NO or UNKNOWN, PLEASE SKIP TO QUESTION #13.  8.	Were you within 6 feet of them for at least 15 minutes? (  ) Yes   (  ) No   (  ) Unknown- Reason: _____  9.	Have you provided care for them? (  ) Yes   (  ) No   (  ) Unknown- Reason: ______  10.	Have you had direct physical contact with them (touched, hugged, or kissed them)? (  ) Yes   (  ) No    (  ) Unknown- Reason: _____  11.	Have you shared eating or drinking utensils with them? (  ) Yes   (  ) No    (  ) Unknown- Reason: ____  12.	Have they sneezed, coughed, or somehow gotten respiratory droplets on you? (  ) Yes   (  ) No    (  ) Unknown- Reason: ______  13.	*Have you been out of [place] within the past 10 days?* (  ) Yes   (  ) No   (  ) Unknown- Reason: _____  IF YES PLEASE ANSWER THE FOLLOWING QUESTIONS:  14.	Which state/country have you been to? ______  15.	Were you there over 24 hours? (  ) Yes   (  ) No    (  ) Unknown- Reason: ______  16.	Date of return to [facility]: ______     COVID Exposure Screen- collateral (i.e. third-party, chart review, belongings, etc; include EMS and ED staff)  1.	*Has the patient had a COVID-19 test in the last 90 days?  (  ) Yes   (  x) No   (  ) Unknown- Reason: _____  IF YES PROCEED TO QUESTION #2. IF NO OR UNKNOWN, PLEASE SKIP TO QUESTION #3.  2.	Date of test(s) and result(s): ________  3.	*Has the patient tested positive for COVID-19 antibodies? (  ) Yes   ( x ) No   (  ) Unknown- Reason: _____  IF YES PROCEED TO QUESTION #4. IF NO or UNKNOWN, PLEASE SKIP TO QUESTION #5.  4.	Date of positive antibody test: ________  5.	*Has the patient received 2 doses of the COVID-19 vaccine? ( x ) Yes   (  ) No   (  ) Unknown- Reason: _____  IF YES PROCEED TO QUESTION #6. IF NO or UNKNOWN, PLEASE SKIP TO QUESTION #7.  6.	 Date of second dose: ________  7.	*In the past 10 days, has the patient been around anyone with a positive COVID-19 test?* (  ) Yes   (  x) No   (  ) Unknown- Reason: __  IF YES PROCEED TO QUESTION #8. IF NO or UNKNOWN, PLEASE SKIP TO QUESTION #13.  8.	Was the patient within 6 feet of them for at least 15 minutes? (  ) Yes   (  ) No   (  ) Unknown- Reason: _____  9.	Did the patient provide care for them? (  ) Yes   (  ) No   (  ) Unknown- Reason: ______  10.	Did the patient have direct physical contact with them (touched, hugged, or kissed them)? (  ) Yes   (  ) No    (  ) Unknown- Reason: __  11.	Did the patient share eating or drinking utensils with them? (  ) Yes   (  ) No    (  ) Unknown- Reason: ____  12.	Did they sneeze, cough, or somehow get respiratory droplets on the patient? (  ) Yes   (  ) No    (  ) Unknown- Reason: ______  13.	*Has the patient been out of [place] within the past 10 days?* (  ) Yes   (  x) No   (  ) Unknown- Reason: _____  IF YES PLEASE ANSWER THE FOLLOWING QUESTIONS:  14.	Which state/country did they go to? ______  15.	Were they there over 24 hours? (  ) Yes   (  ) No    (  ) Unknown- Reason: ______  16.	Date of return to [facility]: ______ COVID Exposure Screen- Patient-UTO  1.	*Have you had a COVID-19 test in the last 90 days?  (  ) Yes   (  ) No   (  ) Unknown- Reason: _____  IF YES PROCEED TO QUESTION #2. IF NO OR UNKNOWN, PLEASE SKIP TO QUESTION #3.  2.	Date of test(s) and result(s): ________  3.	*Have you tested positive for COVID-19 antibodies? (  ) Yes   (  ) No   (  ) Unknown- Reason: _____  IF YES PROCEED TO QUESTION #4. IF NO or UNKNOWN, PLEASE SKIP TO QUESTION #5.  4.	Date of positive antibody test: ________  5.	*Have you received 2 doses of the COVID-19 vaccine? (  ) Yes   (  ) No   (  ) Unknown- Reason: _____   IF YES PROCEED TO QUESTION #6. IF NO or UNKNOWN, PLEASE SKIP TO QUESTION #7.  6.	Date of second dose: ________  7.	*In the past 10 days, have you been around anyone with a positive COVID-19 test?* (  ) Yes   (  ) No   (  ) Unknown- Reason: ____  IF YES PROCEED TO QUESTION #8. IF NO or UNKNOWN, PLEASE SKIP TO QUESTION #13.  8.	Were you within 6 feet of them for at least 15 minutes? (  ) Yes   (  ) No   (  ) Unknown- Reason: _____  9.	Have you provided care for them? (  ) Yes   (  ) No   (  ) Unknown- Reason: ______  10.	Have you had direct physical contact with them (touched, hugged, or kissed them)? (  ) Yes   (  ) No    (  ) Unknown- Reason: _____  11.	Have you shared eating or drinking utensils with them? (  ) Yes   (  ) No    (  ) Unknown- Reason: ____  12.	Have they sneezed, coughed, or somehow gotten respiratory droplets on you? (  ) Yes   (  ) No    (  ) Unknown- Reason: ______  13.	*Have you been out of [place] within the past 10 days?* (  ) Yes   (  ) No   (  ) Unknown- Reason: _____  IF YES PLEASE ANSWER THE FOLLOWING QUESTIONS:  14.	Which state/country have you been to? ______  15.	Were you there over 24 hours? (  ) Yes   (  ) No    (  ) Unknown- Reason: ______  16.	Date of return to [facility]: ______     COVID Exposure Screen- collateral (i.e. third-party, chart review, belongings, etc; include EMS and ED staff)  1.	*Has the patient had a COVID-19 test in the last 90 days?  (  ) Yes   (  x) No   (  ) Unknown- Reason: _____  IF YES PROCEED TO QUESTION #2. IF NO OR UNKNOWN, PLEASE SKIP TO QUESTION #3.  2.	Date of test(s) and result(s): ________  3.	*Has the patient tested positive for COVID-19 antibodies? (  ) Yes   ( x ) No   (  ) Unknown- Reason: _____  IF YES PROCEED TO QUESTION #4. IF NO or UNKNOWN, PLEASE SKIP TO QUESTION #5.  4.	Date of positive antibody test: ________  5.	*Has the patient received 2 doses of the COVID-19 vaccine? ( x ) Yes   (  ) No   (  ) Unknown- Reason: _____  IF YES PROCEED TO QUESTION #6. IF NO or UNKNOWN, PLEASE SKIP TO QUESTION #7.  6.	 Date of second dose: ______approx 2 months ago__  7.	*In the past 10 days, has the patient been around anyone with a positive COVID-19 test?* (  ) Yes   (  x) No   (  ) Unknown- Reason: __  IF YES PROCEED TO QUESTION #8. IF NO or UNKNOWN, PLEASE SKIP TO QUESTION #13.  8.	Was the patient within 6 feet of them for at least 15 minutes? (  ) Yes   (  ) No   (  ) Unknown- Reason: _____  9.	Did the patient provide care for them? (  ) Yes   (  ) No   (  ) Unknown- Reason: ______  10.	Did the patient have direct physical contact with them (touched, hugged, or kissed them)? (  ) Yes   (  ) No    (  ) Unknown- Reason: __  11.	Did the patient share eating or drinking utensils with them? (  ) Yes   (  ) No    (  ) Unknown- Reason: ____  12.	Did they sneeze, cough, or somehow get respiratory droplets on the patient? (  ) Yes   (  ) No    (  ) Unknown- Reason: ______  13.	*Has the patient been out of [place] within the past 10 days?* (  ) Yes   (  x) No   (  ) Unknown- Reason: _____  IF YES PLEASE ANSWER THE FOLLOWING QUESTIONS:  14.	Which state/country did they go to? ______  15.	Were they there over 24 hours? (  ) Yes   (  ) No    (  ) Unknown- Reason: ______  16.	Date of return to [facility]: ______

## 2021-08-11 NOTE — ED ADULT TRIAGE NOTE - CHIEF COMPLAINT QUOTE
Patient has been taking Lexapro for anxiety and depression. But since the last two days she has been crying, becoming confused.

## 2021-08-11 NOTE — BH PATIENT PROFILE - STATED REASON FOR ADMISSION
Pt unable/ unwilling to participate. She was selectively mute and not answering many questions; gestures yes /no.

## 2021-08-11 NOTE — ED BEHAVIORAL HEALTH ASSESSMENT NOTE - OTHER
possible internal preoccupation UTO i-Stop; CVM unable to care for self; non-verbal, appears internally preoccupied, catatonic fx

## 2021-08-11 NOTE — ED PROVIDER NOTE - CLINICAL SUMMARY MEDICAL DECISION MAKING FREE TEXT BOX
This is a 26 yr old F anxiety with c/o depression, anxiety, sadness. Pt arrived with father ( [phone])   Out pt psychiatrist Dr Hawkins.   Pt upon arrival sad, emotional, tearful, crying, slow to respond. States not feeling well. Reports recent medication changes, discontinued clonopin and started Lexapro.   Other psychiatrist Dr Aman BRISCOE ( [phone])  Labs- unrmarkable  Psych- inpatient tx

## 2021-08-11 NOTE — ED BEHAVIORAL HEALTH ASSESSMENT NOTE - NSBHMSESPEECH_PSY_A_CORE
mostly non-verbal; when speaks, no abnormalities, except barely audible/Non-verbal: unable to assess speech further
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
## 2021-08-11 NOTE — ED BEHAVIORAL HEALTH ASSESSMENT NOTE - NSSUICPROTFACT_PSY_ALL_CORE
Responsibility to children, family, or others/Supportive social network of family or friends/Engaged in work or school

## 2021-08-11 NOTE — ED ADULT NURSE NOTE - OBJECTIVE STATEMENT
Pt states that she's unable to concentrate and explain how she's feeling.  Pt spontaneously broke out into tears while being interviewed and undressing.  As per her father, she hasn't been eating the past few days and has smoked marijuana in the past. Pt states that she's unable to concentrate and explain how she's feeling.  Pt was initially very quiet and softspoken but then spontaneously broke out into tears while being interviewed and undressing.  As per her father, she hasn't been eating the past few days and has smoked marijuana in the past.  Pt denies HI, AH/VH.
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
## 2021-08-11 NOTE — ED BEHAVIORAL HEALTH NOTE - BEHAVIORAL HEALTH NOTE
Writer called Anders Knox [phone] Patient resides with parents.  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  He states patient told them she was in school, but he called the chris and got some conflicting information that patient had not attended.  He believes patient was supposed to be enrolled this summer but does not know if she attended.  He states he and pt's mother brought pt to the emergency room today because she has been crying for the past week.  He states he has been trying to call patient's outpatient psychiatric attending but he has not returned a call.  He called the emergency covering physician and spoke to them yesterday.  He states no medication recommendations were made since patient was not suicidal.  He states 2 days ago patient was paranoid that the parents were watching her.    Patient has a history of smoking marijuana and taking Adderall.  Two or three weeks ago patient's mother found marijuana in patient's room and a Vape pen.  He states while on the phone with writer pt's mother found a Tequila 100% bottle in patient's room 1/4 full.  Pt's mother also found some pills today with numbers imprinted on them he states they look like TEVA for erectile dysfunction and cholesterol medication.  He thinks pt is getting pills on the street and being fooled into thinking it's Adderall.  He found a 1.2 ounce bottle of keef oil while on the phone with writer.   He states patient has not been very communicative recently.  Patient states for 3 days patient has complained of anxiety.  Pt's father states he checked her pulse and pt had 110 or 135 pulse.  He's confused and doesn't know if patient Is taking drugs contributing to her anxiety.  He believes patient is using illicit drugs.  He states when she was abusing Adderall pt was irritable, and verbally aggressive, but today she is the opposite.  He states the last time patient was admitted it was because of Adderall abuse.  He does not believe patient is abusing Adderall now.    He denies patient is harmful to others or herself.  He states he does not have any safety concerns.  Patient is currently prescribed Lexapro 10mg, which pt is compliant with.  He states patient's Klonopin was discontinued, but pt's father gave patient some left over Klonopin Monday night and Tuesday night this week and patient slept well.  He gave her Klonopin for anxiety.  He does not know if patient was sleeping well or not before that because she is in her room.    He's requesting patient get food in the ER and medical workup for tachycardia.  He states he does not want a nutritional collapse with hypertension.  He states patient is eating at home and is prompted by her mother to eat.  Patient is drinking water.  Pt does not have any medical issues.  Patient has received Covid vaccine.  Patient does not have sleep apnea. Pt has not travelled in the past 2 weeks.

## 2021-08-11 NOTE — ED PROVIDER NOTE - OBJECTIVE STATEMENT
This is a 26 yr old F anxiety with c/o depression, anxiety, sadness. This is a 26 yr old F anxiety with c/o depression, anxiety, sadness. Pt arrived with father ( [phone])   Out pt psychiatrist Dr Hawkins.   Pt upon arrival sad, emotional, tearful, crying, slow to respond. States not feeling well. Reports recent medication changes, discontinued clonopin and started Lexapro.   Other psychiatrist Dr Aman BRISCOE ( [phone])

## 2021-08-11 NOTE — ED BEHAVIORAL HEALTH ASSESSMENT NOTE - PSYCHIATRIC ISSUES AND PLAN (INCLUDE STANDING AND PRN MEDICATION)
restart LExapro 10mg daily for anxiety/depression; Start Ativan 1mg po q 6 for catatonic fx; Seroquel 25mg hs
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
## 2021-08-12 PROCEDURE — 99223 1ST HOSP IP/OBS HIGH 75: CPT

## 2021-08-12 RX ADMIN — Medication 2 MILLIGRAM(S): at 20:22

## 2021-08-12 RX ADMIN — ESCITALOPRAM OXALATE 10 MILLIGRAM(S): 10 TABLET, FILM COATED ORAL at 08:37

## 2021-08-12 RX ADMIN — Medication 1 MILLIGRAM(S): at 06:59

## 2021-08-12 RX ADMIN — QUETIAPINE FUMARATE 25 MILLIGRAM(S): 200 TABLET, FILM COATED ORAL at 20:22

## 2021-08-12 RX ADMIN — Medication 2 MILLIGRAM(S): at 12:07

## 2021-08-12 NOTE — BH INPATIENT PSYCHIATRY PROGRESS NOTE - NSBHFUPINTERVALHXFT_PSY_A_CORE
Patient is observed in the treatment room, crying, nonverbal. The patient exhibits mutism, staring, and some catatonic symptoms also. The patient is tearful and inconsolable. Patient only nods her head in response to some questions. She is unable to provide any history, but denies any issues at this time. Denies SI/HI/AVH.

## 2021-08-12 NOTE — BH INPATIENT PSYCHIATRY ASSESSMENT NOTE - NSTXSUICIDPROGRES_PSY_ALL_CORE
CHARITY MACEDO    Patient Age: 13 year old   Refill request by: Phone.  Caller informed to check with the pharmacy later for their refill.  If problems arise, we will contact patient.  Refill to be: ePrescribed to [id number] - [place], [place] - [address] [facility] [address]    Medication requested to be refilled:   Dexmethylphenidate HCl (Focalin XR) 40 MG CAPSULE SR 24 HR    Patient notified refills can take 72 hours to process: yes    Patient's next appointment is scheduled for 4/7/2021    Patient's last appointment with this Provider was 1/8/2021         WEIGHT AND HEIGHT:   Wt Readings from Last 1 Encounters:   01/04/20 46.7 kg (103 lb) (83 %, Z= 0.97)*     * Growth percentiles are based on CDC (Girls, 2-20 Years) data.     Ht Readings from Last 1 Encounters:   01/04/20 4' 8.75\" (1.441 m) (46 %, Z= -0.10)*     * Growth percentiles are based on CDC (Girls, 2-20 Years) data.     BMI Readings from Last 1 Encounters:   01/04/20 22.49 kg/m² (91 %, Z= 1.35)*     * Growth percentiles are based on CDC (Girls, 2-20 Years) data.       ALLERGIES:  Patient has no known allergies.  Current Outpatient Medications   Medication   • methylpheniDATE (RITALIN) 10 MG tablet   • Dexmethylphenidate HCl (Focalin XR) 40 MG CAPSULE SR 24 HR   • guanfacine (TENEX) 2 MG tablet   • ARIPiprazole (Abilify) 5 MG tablet     No current facility-administered medications for this visit.        ROUTING: Patient's physician/staff        PCP: No Pcp         INS: Payor: ROYA/MELANIA / Plan: [id number] / Product Type: O MISC   PATIENT ADDRESS:  [id number] [id number]     No Change
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
## 2021-08-12 NOTE — BH INPATIENT PSYCHIATRY ASSESSMENT NOTE - DESCRIPTION
lives with family in [place], father is medical director of spine services at [employer], at times travels from aunt's house to college as it is closer
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
## 2021-08-12 NOTE — BH CHART NOTE - NSEVENTNOTEFT_PSY_ALL_CORE
Called father Dr. Knox at [phone]:  - Notes the patient had done well in school recently. But very stressed and not sleeping well. Feels that she began to use drugs because of her academic stress.   - Symptoms began last Monday, noticed that the patient was crying. Told father that she was "happy so she is crying". Lost significant weight over the past few weeks. Notice that she has twitching in mouth when she was crying.  - Blood pressure was low at home (90s/60s) and high in the ED.   - Found in room: Tequila, dez cigarettes, cigars, glass pipe with "powder", and vape with marijuana smell, Xanax, and Adderall pills. Not sure how long she has been abusing these substances, but suspects she has been using cannabis due to conjunctival injection x 3 weeks.   - Noted to have withdrawn $8000 from her bank account.

## 2021-08-12 NOTE — BH SOCIAL WORK INITIAL PSYCHOSOCIAL EVALUATION - NSBHHOME_PSY_ALL_CORE
Home with Family Complex Repair And Skin Substitute Graft Text: The defect edges were debeveled with a #15 scalpel blade.  The primary defect was closed partially with a complex linear closure.  Given the location of the remaining defect, shape of the defect and the proximity to free margins a skin substitute graft was deemed most appropriate to repair the remaining defect.  The graft was trimmed to fit the size of the remaining defect.  The graft was then placed in the primary defect, oriented appropriately, and sutured into place.
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
## 2021-08-12 NOTE — PSYCHIATRIC REHAB INITIAL EVALUATION - NSBHPRRECOMMEND_PSY_ALL_CORE
Writer met with patient to orient her to psychiatric rehabilitation staff and services. Upon approach, patient verbally agreed to meet with writer and was given the unit schedule. Throughout the session, the patient was mute, uncooperative, and guarded with personal history. Writer encouraged the patient to attend scheduled groups and patient responded with a head nod. Per chart, the patient presents with paranoia in the context of suspected cannabis and Adderall use, and heightened anxiety. Per chart, the patient presents with suspected medication noncompliance and cannot attend to ADLs. Writer was unable to assess for SI, HI, AH, VH, and manic symptoms as the patient remained selectively mute. Writer observed catatonic behavior. Psychiatric rehabilitation staff established a treatment goal for the patient to work on over the next 7 days. Psychiatric Rehabilitation staff will provide encouragement, support, psychotherapy, and psychoeducation to assist the patient in the progression of her treatment goal.

## 2021-08-12 NOTE — BH INPATIENT PSYCHIATRY PROGRESS NOTE - NSBHASSESSSUMMFT_PSY_ALL_CORE
Patient is a 26 year-old female with history of depression and polysubstance use (marijuana, Adderall), brought in by family for worsening anxiety and poor self-care. Patient is tearful and nonverbal. She is noted to be grossly underweight, and has some symptoms of catatonia (mutism, staring, poor PO intake, and some catalepsy). This is very similar to her previous hospitalization. Will treat catatonia and titrate antidepressant medication.    PsyHx: Seen by outpatient provider Dr. Merrill.   : [employer] senior studying psychology    1. Admission status  9.39 (Emergency Admission, extension complete, expires on 8/26)    2. Significant lab findings  UDS positive for THC    3. Psychotropic medications  - Escitalopram 10 mg daily (depression)  	- Home med  - Quetiapine 25 mg QHS (insomnia)  	- Started 8/12  - Lorazepam 2 mg TID (catatonia)   	- Started 8/12    4. Medical conditions, other medications, consults  None    5. Psychosocial interventions  TBD

## 2021-08-12 NOTE — BH INPATIENT PSYCHIATRY ASSESSMENT NOTE - OTHER PAST PSYCHIATRIC HISTORY (INCLUDE DETAILS REGARDING ONSET, COURSE OF ILLNESS, INPATIENT/OUTPATIENT TREATMENT)
inpatient psychiatric admissions:  [address] 4/1/19- 4/23/19 and 10/11-10/29 with paranoia possibly in context of cannabis and Adderall use , currently in treatment with Dr. Merrill at [facility], also attended [facility]BT group in [facility]    [facility] # [id number] Klonopin 0.5 bid #15 5/2021

## 2021-08-12 NOTE — BH INPATIENT PSYCHIATRY ASSESSMENT NOTE - CURRENT MEDICATION
MEDICATIONS  (STANDING):  escitalopram 10 milliGRAM(s) Oral daily  LORazepam     Tablet 2 milliGRAM(s) Oral three times a day  QUEtiapine 25 milliGRAM(s) Oral at bedtime    MEDICATIONS  (PRN):  haloperidol     Tablet 2 milliGRAM(s) Oral every 6 hours PRN agitation  haloperidol    Injectable 5 milliGRAM(s) IntraMuscular once PRN severe agitation

## 2021-08-12 NOTE — BH SOCIAL WORK INITIAL PSYCHOSOCIAL EVALUATION - NSCMSPTSTRENGTHS_PSY_ALL_CORE
Highly motivated for treatment/Intact family/Intelligence/Positive attitude/Strong support system/Successful coping history/Supportive family

## 2021-08-12 NOTE — BH INPATIENT PSYCHIATRY ASSESSMENT NOTE - NSBHMETABOLIC_PSY_ALL_CORE_FT
BMI: BMI (kg/m2): 21.4 (08-11-21 @ 16:58)  HbA1c:   Glucose:   BP: 136/84 (08-11-21 @ 13:07) (136/84 - 136/84)  Lipid Panel:

## 2021-08-12 NOTE — BH SOCIAL WORK INITIAL PSYCHOSOCIAL EVALUATION - OTHER PAST PSYCHIATRIC HISTORY (INCLUDE DETAILS REGARDING ONSET, COURSE OF ILLNESS, INPATIENT/OUTPATIENT TREATMENT)
Pt is 26 year old female, [place] [facility] [address] [facility] [place], in treatment with psychiatrist Dr. Merrill and domiciled with family. Pt was admitted following gradual decline in symptoms, poor ADL's, poor sleep, depressed and anxiety

## 2021-08-12 NOTE — PSYCHIATRIC REHAB INITIAL EVALUATION - NSBHEDULEVEL_PSY_ALL_CORE
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

## 2021-08-12 NOTE — BH INPATIENT PSYCHIATRY ASSESSMENT NOTE - NSBHASSESSSUMMFT_PSY_ALL_CORE
Patient is a 26 year-old female with history of depression and polysubstance use (marijuana, Adderall), brought in by family for worsening anxiety and poor self-care. Patient is tearful and nonverbal. She is noted to be grossly underweight, and has some symptoms of catatonia (mutism, staring, poor PO intake, and some catalepsy). This is very similar to her previous hospitalization. Will treat catatonia and titrate antidepressant medication.  PsyHx: Seen by outpatient provider Dr. Merrill.   : [employer] senior studying psychology    1. Admission status  9.39 (Emergency Admission, extension complete, expires on 8/26)    2. Significant lab findings  UDS positive for THC    3. Psychotropic medications  - Escitalopram 10 mg daily (depression)  	- Home med  - Quetiapine 25 mg QHS (insomnia)  	- Started 8/12  - Lorazepam 2 mg TID (catatonia)   	- Started 8/12    4. Medical conditions, other medications, consults  None    5. Psychosocial interventions  TBD

## 2021-08-12 NOTE — BH INPATIENT PSYCHIATRY ASSESSMENT NOTE - NSBHCHARTREVIEWVS_PSY_A_CORE FT
Vital Signs Last 24 Hrs  T(C): 36.3 (12 Aug 2021 07:43), Max: 36.9 (11 Aug 2021 16:58)  T(F): 97.4 (12 Aug 2021 07:43), Max: 98.4 (11 Aug 2021 16:58)  HR: --  BP: --  BP(mean): --  RR: 16 (11 Aug 2021 16:58) (16 - 16)  SpO2: --

## 2021-08-13 LAB
A1C WITH ESTIMATED AVERAGE GLUCOSE RESULT: 5.3 % — SIGNIFICANT CHANGE UP (ref 4–5.6)
CHOLEST SERPL-MCNC: 160 MG/DL — SIGNIFICANT CHANGE UP
ESTIMATED AVERAGE GLUCOSE: 105 — SIGNIFICANT CHANGE UP
HDLC SERPL-MCNC: 62 MG/DL — SIGNIFICANT CHANGE UP
LIPID PNL WITH DIRECT LDL SERPL: 88 MG/DL — SIGNIFICANT CHANGE UP
NON HDL CHOLESTEROL: 98 MG/DL — SIGNIFICANT CHANGE UP
TRIGL SERPL-MCNC: 50 MG/DL — SIGNIFICANT CHANGE UP

## 2021-08-13 PROCEDURE — 99232 SBSQ HOSP IP/OBS MODERATE 35: CPT

## 2021-08-13 RX ADMIN — Medication 2 MILLIGRAM(S): at 20:18

## 2021-08-13 RX ADMIN — ESCITALOPRAM OXALATE 10 MILLIGRAM(S): 10 TABLET, FILM COATED ORAL at 08:32

## 2021-08-13 RX ADMIN — QUETIAPINE FUMARATE 25 MILLIGRAM(S): 200 TABLET, FILM COATED ORAL at 20:19

## 2021-08-13 RX ADMIN — Medication 2 MILLIGRAM(S): at 08:32

## 2021-08-13 RX ADMIN — Medication 2 MILLIGRAM(S): at 13:24

## 2021-08-13 NOTE — BH INPATIENT PSYCHIATRY PROGRESS NOTE - NSBHASSESSSUMMFT_PSY_ALL_CORE
Patient is a 26 year-old female with history of depression and polysubstance use (marijuana, Adderall), brought in by family for worsening anxiety and poor self-care. Patient is tearful and nonverbal. She is noted to be grossly underweight, and has some symptoms of catatonia (mutism, staring, poor PO intake, and some catalepsy). This is very similar to her previous hospitalization. Will treat catatonia and titrate antidepressant medication.  PsyHx: Seen by outpatient provider Dr. Merrill.   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 Admission status  9.39 (Emergency Admission, extension complete, expires on 8/26)    2. Significant lab findings  UDS positive for THC    3. Psychotropic medications  - Escitalopram 10 mg daily (depression)  	- Home med  - Quetiapine 25 mg QHS (insomnia)  	- Started 8/12  - Lorazepam 2 mg TID (catatonia)   	- Started 8/12    4. Medical conditions, other medications, consults  None    5. Psychosocial interventions  A) Contacted father 8/12

## 2021-08-13 NOTE — BH INPATIENT PSYCHIATRY PROGRESS NOTE - NSBHFUPINTERVALHXFT_PSY_A_CORE
Patient is seen in the treatment room with a mental health worker under close observation. Patient continues to be tearful and minimally verbal, but responds to slightly more questions than yesterday. States that she is doing better because she is eating better. States that she had cereal for breakfast. States that she wants to call her parents, but they have not been in contact yet. Denies any issues with medication. Denies SI/HI/AVH. Reports stable sleep.

## 2021-08-14 PROCEDURE — 99232 SBSQ HOSP IP/OBS MODERATE 35: CPT

## 2021-08-14 RX ADMIN — Medication 2 MILLIGRAM(S): at 08:47

## 2021-08-14 RX ADMIN — ESCITALOPRAM OXALATE 10 MILLIGRAM(S): 10 TABLET, FILM COATED ORAL at 08:47

## 2021-08-14 RX ADMIN — Medication 2 MILLIGRAM(S): at 20:45

## 2021-08-14 RX ADMIN — Medication 2 MILLIGRAM(S): at 13:32

## 2021-08-14 RX ADMIN — QUETIAPINE FUMARATE 25 MILLIGRAM(S): 200 TABLET, FILM COATED ORAL at 21:14

## 2021-08-14 NOTE — BH INPATIENT PSYCHIATRY PROGRESS NOTE - NSBHFUPINTERVALHXFT_PSY_A_CORE
Chart reviewed and case discussed with treatment team. No events reported overnight. Sleep is fair and appetite is improving. RN reported that patient has been eating better with staff support. Patient stated she feels "fine" and denies any SI/HI. She is mostly isolative to her room. Patient is compliant with medications, no adverse effects reported.

## 2021-08-14 NOTE — BH INPATIENT PSYCHIATRY PROGRESS NOTE - NSBHASSESSSUMMFT_PSY_ALL_CORE
Patient showing some improvement in appetite but remains very depressed and isolative to her room. She is minimally engaged in interview and denies any SI.     c/w current medications

## 2021-08-15 PROCEDURE — 99232 SBSQ HOSP IP/OBS MODERATE 35: CPT

## 2021-08-15 RX ADMIN — Medication 2 MILLIGRAM(S): at 10:00

## 2021-08-15 RX ADMIN — Medication 2 MILLIGRAM(S): at 13:23

## 2021-08-15 RX ADMIN — Medication 2 MILLIGRAM(S): at 22:40

## 2021-08-15 RX ADMIN — QUETIAPINE FUMARATE 25 MILLIGRAM(S): 200 TABLET, FILM COATED ORAL at 22:40

## 2021-08-15 RX ADMIN — ESCITALOPRAM OXALATE 10 MILLIGRAM(S): 10 TABLET, FILM COATED ORAL at 09:59

## 2021-08-15 NOTE — BH INPATIENT PSYCHIATRY PROGRESS NOTE - NSBHASSESSSUMMFT_PSY_ALL_CORE
Patient showing some improvement in appetite but remains very depressed and isolative to her room. She is minimally engaged in interview and denies any SI, appeared more catatonic today.     c/w current medications

## 2021-08-15 NOTE — BH INPATIENT PSYCHIATRY PROGRESS NOTE - NSBHFUPINTERVALHXFT_PSY_A_CORE
Chart reviewed and case discussed with treatment team. No events reported overnight. Sleep is fair and appetite is improving. Patient was less engaged in interview today. She shook her head yes or no to most questions, minimally verbal. She is isolative to her room. Patient is compliant with medications, no adverse effects reported.

## 2021-08-16 PROCEDURE — 99232 SBSQ HOSP IP/OBS MODERATE 35: CPT

## 2021-08-16 RX ADMIN — ESCITALOPRAM OXALATE 10 MILLIGRAM(S): 10 TABLET, FILM COATED ORAL at 09:03

## 2021-08-16 RX ADMIN — Medication 2 MILLIGRAM(S): at 09:03

## 2021-08-16 RX ADMIN — Medication 2 MILLIGRAM(S): at 12:59

## 2021-08-16 RX ADMIN — Medication 2 MILLIGRAM(S): at 21:08

## 2021-08-16 NOTE — BH INPATIENT PSYCHIATRY PROGRESS NOTE - NSBHCONSBHPROVDETAILS_PSY_A_CORE  FT
Called Dr. Juan Merrill 8/16:   Called Dr. 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  - At last outpatient appointment Klonopin was tapered. Abilify was tapered in April 2021.   - Developed symptoms of catatonia after Adderall, leading to hospitalizations in the past. Suspect this led to current hospitalization as well.  - Took up to one month for her to improve in the past.

## 2021-08-16 NOTE — BH INPATIENT PSYCHIATRY PROGRESS NOTE - NSBHFUPINTERVALHXFT_PSY_A_CORE
Patient is seen in her room, in no acute distress, amenable to interview. Patient continues to be minimally verbal, and responds to most questions with just yes or no. Patient exhibits a flat affect and increased latency to speech onset. Acknowledges that her father visited and that the visit went well. States that her current level of depression is 6 out of 10 in severity, and that it has improved compared to her initial presentation. Reports stable sleep and appetite. Denies SI/HI/AVH.

## 2021-08-16 NOTE — BH INPATIENT PSYCHIATRY PROGRESS NOTE - NSBHASSESSSUMMFT_PSY_ALL_CORE
Patient is a 26 year-old female with history of depression and polysubstance use (marijuana, Adderall), brought in by family for worsening anxiety and poor self-care. Patient is tearful and nonverbal. She is noted to be grossly underweight, and has some symptoms of catatonia (mutism, staring, poor PO intake, and some catalepsy). This is very similar to her previous hospitalization. Will treat catatonia and titrate antidepressant medication.  PsyHx: Seen by outpatient provider Dr. Merrill.   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 Patient is tearful and nonverbal. She is noted to be grossly underweight, and has some symptoms of catatonia (mutism, staring, poor PO intake, and some catalepsy). This is very similar to her previous hospitalization. Will treat catatonia and titrate antidepressant medication.  PsyHx: Seen by outpatient provider Dr. Merrill.   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 Admission status  9.39 (Emergency Admission, extension complete, expires on 8/26)    2. Significant lab findings  UDS positive for THC    3. Psychotropic medications  - Escitalopram 10 mg daily (depression)  	- Home med  - Lorazepam 2 mg TID (catatonia)   	- Started 8/12  - Discontinued quetiapine - patient with adequate sleep    4. Medical conditions, other medications, consults  None    5. Psychosocial interventions  A) Remaining in contact with father Dr. Knox

## 2021-08-17 PROCEDURE — 99232 SBSQ HOSP IP/OBS MODERATE 35: CPT

## 2021-08-17 RX ADMIN — Medication 1 MILLIGRAM(S): at 21:57

## 2021-08-17 RX ADMIN — Medication 2 MILLIGRAM(S): at 08:59

## 2021-08-17 RX ADMIN — ESCITALOPRAM OXALATE 10 MILLIGRAM(S): 10 TABLET, FILM COATED ORAL at 08:59

## 2021-08-17 RX ADMIN — Medication 1 MILLIGRAM(S): at 13:53

## 2021-08-17 RX ADMIN — HALOPERIDOL DECANOATE 2 MILLIGRAM(S): 100 INJECTION INTRAMUSCULAR at 23:28

## 2021-08-17 NOTE — BH INPATIENT PSYCHIATRY PROGRESS NOTE - NSBHFUPINTERVALHXFT_PSY_A_CORE
Chart reviewed, including vital signs, medication administration record, lab results, and nursing notes.   Case discussed with nursing, psychology, psych rehab, and social work in team meeting.     Patient is seen lying in bed, minimally responsive, and nonverbal today. She responds to certain questions by shaking her head or nodding her head. She is tearful and otherwise does not get out of bed to speak with me.    Called father Dr. Knox at [phone]:  - Updated father about the patient's improved appetite but continued sedation. Discussed plan to reduce dose of Ativan.   - Father unwilling to consider ECT at this time, and states that the patient is "not willing to do that."

## 2021-08-17 NOTE — BH INPATIENT PSYCHIATRY PROGRESS NOTE - NSBHCONSBHPROVDETAILS_PSY_A_CORE  FT
Called Dr. 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  - At last outpatient appointment Klonopin was tapered. Abilify was tapered in April 2021.   - Developed symptoms of catatonia after Adderall, leading to hospitalizations in the past. Suspect this led to current hospitalization as well.  - Took up to one month for her to improve in the past.

## 2021-08-17 NOTE — BH INPATIENT PSYCHIATRY PROGRESS NOTE - NSBHASSESSSUMMFT_PSY_ALL_CORE
Patient is a 26 year-old female with history of depression and polysubstance use (marijuana, Adderall), brought in by family for worsening anxiety and poor self-care. Patient is tearful and nonverbal. She is noted to be grossly underweight, and has some symptoms of catatonia (mutism, staring, poor PO intake, and some catalepsy). This is very similar to her previous hospitalization. Will treat catatonia and titrate antidepressant medication.  PsyHx: Seen by outpatient provider Dr. Merrill.   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 Will reduce dose of Ativan in light of oversedation.     1. Admission status  9.39 (Emergency Admission, extension complete, expires on 8/26)    2. Significant lab findings  UDS positive for THC    3. Psychotropic medications  - Escitalopram 10 mg daily (depression)  	- Home med  - Lorazepam 1 mg TID (catatonia)   	- Started 8/12, decreased 8/17  - Discontinued quetiapine - patient with adequate sleep    4. Medical conditions, other medications, consults  None    5. Psychosocial interventions  A) Remaining in contact with father Dr. Knox

## 2021-08-18 PROCEDURE — 99232 SBSQ HOSP IP/OBS MODERATE 35: CPT

## 2021-08-18 RX ADMIN — Medication 1 MILLIGRAM(S): at 09:16

## 2021-08-18 RX ADMIN — Medication 1 MILLIGRAM(S): at 15:31

## 2021-08-18 RX ADMIN — ESCITALOPRAM OXALATE 10 MILLIGRAM(S): 10 TABLET, FILM COATED ORAL at 09:15

## 2021-08-18 RX ADMIN — Medication 1 MILLIGRAM(S): at 22:15

## 2021-08-18 NOTE — BH INPATIENT PSYCHIATRY PROGRESS NOTE - NSBHASSESSSUMMFT_PSY_ALL_CORE
Patient is a 26 year-old female with history of depression and polysubstance use (marijuana, Adderall), brought in by family for worsening anxiety and poor self-care. Patient is tearful and nonverbal. She is noted to be grossly underweight, and has some symptoms of catatonia (mutism, staring, poor PO intake, and some catalepsy). This is very similar to her previous hospitalization. Will treat catatonia and titrate antidepressant medication.  PsyHx: Seen by outpatient provider Dr. Merrill.   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     1. Admission status  9.39 (Emergency Admission, extension complete, expires on 8/26)    2. Significant lab findings  UDS positive for THC    3. Psychotropic medications  - Escitalopram 10 mg daily (depression)  	- Home med  - Lorazepam 1 mg TID (catatonia)   	- Started 8/12, decreased 8/17  - Discontinued quetiapine - patient with adequate sleep    4. Medical conditions, other medications, consults  None    5. Psychosocial interventions  A) Remaining in contact with father Dr. Knox

## 2021-08-18 NOTE — BH INPATIENT PSYCHIATRY PROGRESS NOTE - NSBHFUPINTERVALHXFT_PSY_A_CORE
Chart reviewed, including vital signs, medication administration record, lab results, and nursing notes.   Case discussed with nursing, psychology, psych rehab, and social work in team meeting.     Patient is seen in bed, sleeping, but awakens and is minimally cooperative with interview. She is minimally verbal and only responds to questions with yes or no. She opens her eyes and shakes her head when asked if she has any issues. Denies SI/HI/AVH.

## 2021-08-19 PROCEDURE — 99232 SBSQ HOSP IP/OBS MODERATE 35: CPT

## 2021-08-19 PROCEDURE — 90853 GROUP PSYCHOTHERAPY: CPT

## 2021-08-19 RX ADMIN — ESCITALOPRAM OXALATE 10 MILLIGRAM(S): 10 TABLET, FILM COATED ORAL at 09:22

## 2021-08-19 RX ADMIN — Medication 1 MILLIGRAM(S): at 09:22

## 2021-08-19 RX ADMIN — Medication 1 MILLIGRAM(S): at 12:56

## 2021-08-19 RX ADMIN — Medication 1 MILLIGRAM(S): at 21:29

## 2021-08-19 NOTE — BH INPATIENT PSYCHIATRY PROGRESS NOTE - NSBHFUPINTERVALHXFT_PSY_A_CORE
Chart reviewed, including vital signs, medication administration record, lab results, and nursing notes.   Case discussed with nursing, psychology, psych rehab, and social work in team meeting.     Patient is seen in her room, in no acute distress, amenable to interview. Overall patient seen with brighter affect and more reactive compared to yesterday; her hygiene and grooming is also much improved. Patient awakens and is able to sit up. She denies any issues today. 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 Denies SI/HI/AVH.

## 2021-08-19 NOTE — BH INPATIENT PSYCHIATRY PROGRESS NOTE - NSBHASSESSSUMMFT_PSY_ALL_CORE
Patient is a 26 year-old female with history of depression and polysubstance use (marijuana, Adderall), brought in by family for worsening anxiety and poor self-care. Patient is tearful and nonverbal. She is noted to be grossly underweight, and has some symptoms of catatonia (mutism, staring, poor PO intake, and some catalepsy). This is very similar to her previous hospitalization. Will treat catatonia and titrate antidepressant medication.  PsyHx: Seen by outpatient provider Dr. Merrill.   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    1. Admission status  9.39 (Emergency Admission, extension complete, expires on 8/26)    2. Significant lab findings  UDS positive for THC    3. Psychotropic medications  - Escitalopram 10 mg daily (depression)  	- Home med  - Lorazepam 1 mg TID (catatonia)   	- Started 8/12, decreased 8/17  - Discontinued quetiapine - patient with adequate sleep    4. Medical conditions, other medications, consults  None    5. Psychosocial interventions  A) Remaining in contact with father Dr. Knox

## 2021-08-20 PROCEDURE — 99231 SBSQ HOSP IP/OBS SF/LOW 25: CPT

## 2021-08-20 RX ADMIN — Medication 1 MILLIGRAM(S): at 20:43

## 2021-08-20 RX ADMIN — Medication 1 MILLIGRAM(S): at 09:38

## 2021-08-20 RX ADMIN — ESCITALOPRAM OXALATE 10 MILLIGRAM(S): 10 TABLET, FILM COATED ORAL at 09:38

## 2021-08-20 RX ADMIN — Medication 1 MILLIGRAM(S): at 13:39

## 2021-08-20 NOTE — BH INPATIENT PSYCHIATRY PROGRESS NOTE - NSBHFUPINTERVALHXFT_PSY_A_CORE
Chart reviewed, including vital signs, medication administration record, lab results, and nursing notes.   Case discussed with nursing, psychology, psych rehab, and social work in team meeting.     Patient is seen lying in bed, in no acute distress, amenable to interview. She awakens for interview, but she is nonverbal today. She shakes her head when asked if she is suicidal. Denies any issues or concerns. She is unable to set up and respond to further questions appropriately.

## 2021-08-20 NOTE — BH INPATIENT PSYCHIATRY PROGRESS NOTE - NSBHASSESSSUMMFT_PSY_ALL_CORE
Patient is a 26 year-old female with history of depression and polysubstance use (marijuana, Adderall), brought in by family for worsening anxiety and poor self-care. Patient is tearful and nonverbal. She is noted to be grossly underweight, and has some symptoms of catatonia (mutism, staring, poor PO intake, and some catalepsy). This is very similar to her previous hospitalization. Will treat catatonia and titrate antidepressant medication.  PsyHx: Seen by outpatient provider Dr. Merrill.   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    1. Admission status  9.39 (Emergency Admission, extension complete, expires on 8/26)    2. Significant lab findings  UDS positive for THC    3. Psychotropic medications  - Escitalopram 10 mg daily (depression)  	- Home med  - Lorazepam 1 mg TID (catatonia)   	- Started 8/12, decreased 8/17  - Discontinued quetiapine - patient with adequate sleep    4. Medical conditions, other medications, consults  None    5. Psychosocial interventions  A) Remaining in contact with father Dr. Knox

## 2021-08-21 RX ADMIN — Medication 1 MILLIGRAM(S): at 20:48

## 2021-08-21 RX ADMIN — Medication 1 MILLIGRAM(S): at 12:30

## 2021-08-21 RX ADMIN — ESCITALOPRAM OXALATE 10 MILLIGRAM(S): 10 TABLET, FILM COATED ORAL at 12:20

## 2021-08-22 RX ADMIN — Medication 1 MILLIGRAM(S): at 14:35

## 2021-08-22 RX ADMIN — ESCITALOPRAM OXALATE 10 MILLIGRAM(S): 10 TABLET, FILM COATED ORAL at 08:51

## 2021-08-22 RX ADMIN — Medication 1 MILLIGRAM(S): at 21:11

## 2021-08-22 RX ADMIN — Medication 1 MILLIGRAM(S): at 08:51

## 2021-08-23 PROCEDURE — 99232 SBSQ HOSP IP/OBS MODERATE 35: CPT

## 2021-08-23 RX ADMIN — ESCITALOPRAM OXALATE 10 MILLIGRAM(S): 10 TABLET, FILM COATED ORAL at 09:05

## 2021-08-23 RX ADMIN — Medication 1 MILLIGRAM(S): at 12:35

## 2021-08-23 RX ADMIN — Medication 0.5 MILLIGRAM(S): at 21:21

## 2021-08-23 RX ADMIN — Medication 1 MILLIGRAM(S): at 09:05

## 2021-08-23 NOTE — BH INPATIENT PSYCHIATRY PROGRESS NOTE - NSBHASSESSSUMMFT_PSY_ALL_CORE
Patient is a 26 year-old female with history of depression and polysubstance use (marijuana, Adderall), brought in by family for worsening anxiety and poor self-care. Patient is tearful and nonverbal. She is noted to be grossly underweight, and has some symptoms of catatonia (mutism, staring, poor PO intake, and some catalepsy). This is very similar to her previous hospitalization. Will treat catatonia and titrate antidepressant medication.  PsyHx: Seen by outpatient provider Dr. Merrill.   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 This was originally believed to be related to catatonia, but Ativan has not been effective in alleviating this, so we will taper it. Will consider other medication changes.     1. Admission status  9.39 (Emergency Admission, extension complete, expires on 8/26)    2. Significant lab findings  UDS positive for THC    3. Psychotropic medications  - Escitalopram 10 mg daily (depression)  	- Home med  - Lorazepam 0.5 mg TID (catatonia)   	- Started 8/12, decreased 8/17, decreased 8/23  - Discontinued quetiapine - patient with adequate sleep    4. Medical conditions, other medications, consults  None    5. Psychosocial interventions  A) Remaining in contact with father Dr. Knox

## 2021-08-23 NOTE — BH INPATIENT PSYCHIATRY PROGRESS NOTE - NSBHFUPINTERVALHXFT_PSY_A_CORE
Chart reviewed, including vital signs, medication administration record, lab results, and nursing notes.   Case discussed with nursing, psychology, psych rehab, and social work in team meeting.     Patient is seen lying in bed, and no acute distress, amenable to interview. Patient is minimally verbal today, and responds to questions by nodding or shaking her head. Denies any issues today. Denies SI/HI/AVH. Report stable sleep and appetite. Patient is seen in the day room attending a group. Her affect ranges from constricted to tearful.

## 2021-08-24 PROCEDURE — 90853 GROUP PSYCHOTHERAPY: CPT

## 2021-08-24 PROCEDURE — 99233 SBSQ HOSP IP/OBS HIGH 50: CPT | Mod: 25

## 2021-08-24 RX ORDER — BUPROPION HYDROCHLORIDE 150 MG/1
150 TABLET, EXTENDED RELEASE ORAL DAILY
Refills: 0 | Status: DISCONTINUED | OUTPATIENT
Start: 2021-08-24 | End: 2021-09-01

## 2021-08-24 RX ADMIN — Medication 0.5 MILLIGRAM(S): at 08:50

## 2021-08-24 RX ADMIN — BUPROPION HYDROCHLORIDE 150 MILLIGRAM(S): 150 TABLET, EXTENDED RELEASE ORAL at 14:12

## 2021-08-24 RX ADMIN — Medication 0.5 MILLIGRAM(S): at 21:38

## 2021-08-24 RX ADMIN — Medication 0.5 MILLIGRAM(S): at 13:09

## 2021-08-24 RX ADMIN — ESCITALOPRAM OXALATE 10 MILLIGRAM(S): 10 TABLET, FILM COATED ORAL at 08:50

## 2021-08-24 NOTE — BH INPATIENT PSYCHIATRY PROGRESS NOTE - NSBHFUPINTERVALHXFT_PSY_A_CORE
Chart reviewed, including vital signs, medication administration record, lab results, and nursing notes.   Case discussed with nursing, psychology, psych rehab, and social work in team meeting.     Patient is seen sitting in bed, in no acute distress, and amenable to interview. Patient is nonverbal today. Shakes her head when asked if she has any concerns. Denies SI/HI/AVH. Reports stable sleep and appetite. Patient is tearful and mumbles under her breath.

## 2021-08-24 NOTE — BH INPATIENT PSYCHIATRY PROGRESS NOTE - NSBHASSESSSUMMFT_PSY_ALL_CORE
Patient is a 26 year-old female with history of depression and polysubstance use (marijuana, Adderall), brought in by family for worsening anxiety and poor self-care. Patient is tearful and nonverbal. She is noted to be grossly underweight, and has some catatonic features (mutism, staring, poor PO intake) but this may be more related to psychomotor slowing.   PsyHx: Seen by outpatient provider Dr. Merrill.   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 This was originally believed to be related to catatonia, but Ativan has not been effective in alleviating this, so we will taper it. Discussed with father and outpatient provider Dr. Merrill who agree with plan to start the antidepressant bupropion.     1. Admission status  9.39 (Emergency Admission, extension complete, expires on 8/26)    2. Significant lab findings  UDS positive for THC    3. Psychotropic medications  - Escitalopram 10 mg daily (depression)  	- Home med  - Bupropion  mg daily (psychomotor slowing)  	- Started 8/24  - Lorazepam 0.5 mg TID (catatonia)   	- Started 8/12, decreased 8/17, decreased 8/23  - Discontinued quetiapine - patient with adequate sleep    4. Medical conditions, other medications, consults  None    5. Psychosocial interventions  A) Remaining in contact with father Dr. Knox

## 2021-08-25 DIAGNOSIS — F32.9 MAJOR DEPRESSIVE DISORDER, SINGLE EPISODE, UNSPECIFIED: ICD-10-CM

## 2021-08-25 LAB
ALBUMIN SERPL ELPH-MCNC: 4.8 G/DL — SIGNIFICANT CHANGE UP (ref 3.3–5)
ALP SERPL-CCNC: 56 U/L — SIGNIFICANT CHANGE UP (ref 40–120)
ALT FLD-CCNC: 11 U/L — SIGNIFICANT CHANGE UP (ref 4–33)
ANION GAP SERPL CALC-SCNC: 17 MMOL/L — HIGH (ref 7–14)
AST SERPL-CCNC: 13 U/L — SIGNIFICANT CHANGE UP (ref 4–32)
BASOPHILS # BLD AUTO: 0.06 K/UL — SIGNIFICANT CHANGE UP (ref 0–0.2)
BASOPHILS NFR BLD AUTO: 0.6 % — SIGNIFICANT CHANGE UP (ref 0–2)
BILIRUB SERPL-MCNC: 0.6 MG/DL — SIGNIFICANT CHANGE UP (ref 0.2–1.2)
BUN SERPL-MCNC: 11 MG/DL — SIGNIFICANT CHANGE UP (ref 7–23)
CALCIUM SERPL-MCNC: 9.7 MG/DL — SIGNIFICANT CHANGE UP (ref 8.4–10.5)
CHLORIDE SERPL-SCNC: 100 MMOL/L — SIGNIFICANT CHANGE UP (ref 98–107)
CO2 SERPL-SCNC: 21 MMOL/L — LOW (ref 22–31)
CREAT SERPL-MCNC: 0.65 MG/DL — SIGNIFICANT CHANGE UP (ref 0.5–1.3)
EOSINOPHIL # BLD AUTO: 0.09 K/UL — SIGNIFICANT CHANGE UP (ref 0–0.5)
EOSINOPHIL NFR BLD AUTO: 0.9 % — SIGNIFICANT CHANGE UP (ref 0–6)
GLUCOSE SERPL-MCNC: 86 MG/DL — SIGNIFICANT CHANGE UP (ref 70–99)
HCT VFR BLD CALC: 39 % — SIGNIFICANT CHANGE UP (ref 34.5–45)
HGB BLD-MCNC: 12.5 G/DL — SIGNIFICANT CHANGE UP (ref 11.5–15.5)
IANC: 7.09 K/UL — SIGNIFICANT CHANGE UP (ref 1.5–8.5)
IMM GRANULOCYTES NFR BLD AUTO: 0.3 % — SIGNIFICANT CHANGE UP (ref 0–1.5)
LYMPHOCYTES # BLD AUTO: 2.47 K/UL — SIGNIFICANT CHANGE UP (ref 1–3.3)
LYMPHOCYTES # BLD AUTO: 23.5 % — SIGNIFICANT CHANGE UP (ref 13–44)
MCHC RBC-ENTMCNC: 29.6 PG — SIGNIFICANT CHANGE UP (ref 27–34)
MCHC RBC-ENTMCNC: 32.1 GM/DL — SIGNIFICANT CHANGE UP (ref 32–36)
MCV RBC AUTO: 92.2 FL — SIGNIFICANT CHANGE UP (ref 80–100)
MONOCYTES # BLD AUTO: 0.77 K/UL — SIGNIFICANT CHANGE UP (ref 0–0.9)
MONOCYTES NFR BLD AUTO: 7.3 % — SIGNIFICANT CHANGE UP (ref 2–14)
NEUTROPHILS # BLD AUTO: 7.09 K/UL — SIGNIFICANT CHANGE UP (ref 1.8–7.4)
NEUTROPHILS NFR BLD AUTO: 67.4 % — SIGNIFICANT CHANGE UP (ref 43–77)
NRBC # BLD: 0 /100 WBCS — SIGNIFICANT CHANGE UP
NRBC # FLD: 0 K/UL — SIGNIFICANT CHANGE UP
PLATELET # BLD AUTO: 266 K/UL — SIGNIFICANT CHANGE UP (ref 150–400)
POTASSIUM SERPL-MCNC: 3.7 MMOL/L — SIGNIFICANT CHANGE UP (ref 3.5–5.3)
POTASSIUM SERPL-SCNC: 3.7 MMOL/L — SIGNIFICANT CHANGE UP (ref 3.5–5.3)
PROT SERPL-MCNC: 7.9 G/DL — SIGNIFICANT CHANGE UP (ref 6–8.3)
RBC # BLD: 4.23 M/UL — SIGNIFICANT CHANGE UP (ref 3.8–5.2)
RBC # FLD: 14 % — SIGNIFICANT CHANGE UP (ref 10.3–14.5)
SODIUM SERPL-SCNC: 138 MMOL/L — SIGNIFICANT CHANGE UP (ref 135–145)
WBC # BLD: 10.51 K/UL — HIGH (ref 3.8–10.5)
WBC # FLD AUTO: 10.51 K/UL — HIGH (ref 3.8–10.5)

## 2021-08-25 PROCEDURE — 93010 ELECTROCARDIOGRAM REPORT: CPT

## 2021-08-25 RX ADMIN — BUPROPION HYDROCHLORIDE 150 MILLIGRAM(S): 150 TABLET, EXTENDED RELEASE ORAL at 09:43

## 2021-08-25 RX ADMIN — Medication 1 MILLIGRAM(S): at 15:03

## 2021-08-25 RX ADMIN — Medication 1 MILLIGRAM(S): at 22:06

## 2021-08-25 RX ADMIN — ESCITALOPRAM OXALATE 10 MILLIGRAM(S): 10 TABLET, FILM COATED ORAL at 09:08

## 2021-08-25 RX ADMIN — Medication 0.5 MILLIGRAM(S): at 09:08

## 2021-08-25 NOTE — BH INPATIENT PSYCHIATRY PROGRESS NOTE - NSBHASSESSSUMMFT_PSY_ALL_CORE
Patient is a 26 year-old female with history of depression and polysubstance use (marijuana, Adderall), brought in by family for worsening anxiety and poor self-care. Patient is tearful and nonverbal. She is noted to be grossly underweight, and has some catatonic features (mutism, staring, poor PO intake) but this may be more related to psychomotor slowing.   PsyHx: Seen by outpatient provider Dr. Merrill.   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 However it was noted that she became overly sedated, and the dose of lorazepam was reduced. Patient minimally verbal but able to respond to questions nonverbally. Patient started on the antidepressant bupropion to target psychomotor slowing. Discussed plan with father and outpatient provider Dr. Merrill who agree with plan to start the antidepressant bupropion.   8/25:  Patient exhibiting worsening symptoms of catatonia, particularly mutism, immobility, and catalepsy. Will resume previous dose of lorazepam.     1. Admission status  9.39 (Emergency Admission, extension complete, expires on 8/26)    2. Significant lab findings  UDS positive for THC    3. Psychotropic medications  - Escitalopram 10 mg daily (depression)  	- Home med  - Bupropion  mg daily (psychomotor slowing)  	- Started 8/24  - Lorazepam 1 mg TID (catatonia)   	- Started 8/12, decreased 8/17, decreased 8/23, increased 8/25  - Discontinued quetiapine - patient with adequate sleep    4. Medical conditions, other medications, consults  None    5. Psychosocial interventions  A) Remaining in contact with father Dr. Knox

## 2021-08-25 NOTE — BH INPATIENT PSYCHIATRY PROGRESS NOTE - NSBHFUPINTERVALHXFT_PSY_A_CORE
Chart reviewed, including vital signs, medication administration record, lab results, and nursing notes.   Case discussed with nursing, psychology, psych rehab, and social work in team meeting.   - Patient noted to be tachycardic, laughs inappropriately to herself at times    Patient is seen in her bed, and she laughs inappropriately as the treatment team approaches. She is not verbal today and does not respond to any questions. She fluctuates between being tearful and laughing. On physical exam she has noted to have some catalepsy.

## 2021-08-26 PROCEDURE — 99232 SBSQ HOSP IP/OBS MODERATE 35: CPT

## 2021-08-26 RX ADMIN — ESCITALOPRAM OXALATE 10 MILLIGRAM(S): 10 TABLET, FILM COATED ORAL at 09:11

## 2021-08-26 RX ADMIN — Medication 1 MILLIGRAM(S): at 13:53

## 2021-08-26 RX ADMIN — Medication 1 MILLIGRAM(S): at 09:11

## 2021-08-26 RX ADMIN — Medication 1 MILLIGRAM(S): at 21:20

## 2021-08-26 RX ADMIN — BUPROPION HYDROCHLORIDE 150 MILLIGRAM(S): 150 TABLET, EXTENDED RELEASE ORAL at 09:11

## 2021-08-26 NOTE — BH INPATIENT PSYCHIATRY PROGRESS NOTE - NSBHFUPINTERVALHXFT_PSY_A_CORE
Chart reviewed, including vital signs, medication administration record, lab results, and nursing notes.   Case discussed with nursing, psychology, psych rehab, and social work in team meeting.   - Patient noted to be tachycardic, laughs inappropriately to herself at times    Patient is seen sitting in bed, in no acute distress, amenable to interview. Patient is unable to respond verbally to any questions, but does shake or nod her head in response to questions. Denies any issues today. Denies SI/HI/AVH. Ruvalcaba Marty catatonia ratings scale score of 12.

## 2021-08-26 NOTE — BH INPATIENT PSYCHIATRY PROGRESS NOTE - NSBHASSESSSUMMFT_PSY_ALL_CORE
Patient is a 26 year-old female with history of depression and polysubstance use (marijuana, Adderall), brought in by family for worsening anxiety and poor self-care. Patient is tearful and nonverbal. She is noted to be grossly underweight, and has some catatonic features (mutism, staring, poor PO intake) but this may be more related to psychomotor slowing.   PsyHx: Seen by outpatient provider Dr. Merrill.   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 However it was noted that she became overly sedated, and the dose of lorazepam was reduced. Patient minimally verbal but able to respond to questions nonverbally. Patient started on the antidepressant bupropion to target psychomotor slowing. Discussed plan with father and outpatient provider Dr. Merrill who agree with plan to start the antidepressant bupropion.   8/25:  Patient exhibiting worsening symptoms of catatonia, particularly mutism, immobility, and catalepsy. Will resume previous dose of lorazepam. Will monitor catatonia with Ruvalcaba Marty scale.    1. Admission status  9.39 (Emergency Admission, extension complete, expires on 8/26)    2. Significant lab findings  UDS positive for THC    3. Psychotropic medications  - Escitalopram 10 mg daily (depression)  	- Home med  - Bupropion  mg daily (psychomotor slowing)  	- Started 8/24  - Lorazepam 1 mg TID (catatonia)   	- Started 8/12, decreased 8/17, decreased 8/23, increased 8/25  - Discontinued quetiapine - patient with adequate sleep    4. Medical conditions, other medications, consults  None    5. Psychosocial interventions  A) Remaining in contact with father Dr. Knox

## 2021-08-27 PROCEDURE — 99232 SBSQ HOSP IP/OBS MODERATE 35: CPT

## 2021-08-27 RX ADMIN — Medication 1 MILLIGRAM(S): at 21:04

## 2021-08-27 RX ADMIN — Medication 1 MILLIGRAM(S): at 09:02

## 2021-08-27 RX ADMIN — ESCITALOPRAM OXALATE 10 MILLIGRAM(S): 10 TABLET, FILM COATED ORAL at 09:00

## 2021-08-27 RX ADMIN — BUPROPION HYDROCHLORIDE 150 MILLIGRAM(S): 150 TABLET, EXTENDED RELEASE ORAL at 09:00

## 2021-08-27 RX ADMIN — Medication 1 MILLIGRAM(S): at 13:54

## 2021-08-27 NOTE — BH INPATIENT PSYCHIATRY PROGRESS NOTE - OTHER
minimal, more catatonic than yesterday Significant psychomotor slowing, catalepsy noted Significant psychomotor slowing, catalepsy noted, improving

## 2021-08-27 NOTE — BH INPATIENT PSYCHIATRY PROGRESS NOTE - NSBHASSESSSUMMFT_PSY_ALL_CORE
Patient is a 26 year-old female with history of depression and polysubstance use (marijuana, Adderall), brought in by family for worsening anxiety and poor self-care. Patient is tearful and nonverbal. She is noted to be grossly underweight, and has some catatonic features (mutism, staring, poor PO intake) but this may be more related to psychomotor slowing.   PsyHx: Seen by outpatient provider Dr. Merrill.   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 However it was noted that she became overly sedated, and the dose of lorazepam was reduced. Patient minimally verbal but able to respond to questions nonverbally. Patient started on the antidepressant bupropion to target psychomotor slowing. Discussed plan with father and outpatient provider Dr. Merrill who agree with plan to start the antidepressant bupropion.   8/25:  Patient exhibiting worsening symptoms of catatonia, particularly mutism, immobility, and catalepsy. Will resume previous dose of lorazepam. Will monitor catatonia with Ruvalcaba Marty scale.    1. Admission status  9.39 (Emergency Admission, extension complete, expires on 8/26)    2. Significant lab findings  UDS positive for THC    3. Psychotropic medications  - Escitalopram 10 mg daily (depression)  	- Home med  - Bupropion  mg daily (psychomotor slowing)  	- Started 8/24  - Lorazepam 1 mg TID (catatonia)   	- Started 8/12, decreased 8/17, decreased 8/23, increased 8/25  - Discontinued quetiapine - patient with adequate sleep    4. Medical conditions, other medications, consults  None    5. Psychosocial interventions  A) Remaining in contact with father Dr. Knox

## 2021-08-27 NOTE — BH INPATIENT PSYCHIATRY PROGRESS NOTE - NSBHFUPINTERVALHXFT_PSY_A_CORE
Chart reviewed, including vital signs, medication administration record, lab results, and nursing notes.   Case discussed with nursing, psychology, psych rehab, and social work in team meeting.   - Patient noted to be tachycardic, laughs inappropriately to herself at times    Patient is seen sitting in bed, in no acute distress, amenable to interview. Patient is unable to respond verbally to any questions, but does shake or nod her head in response to questions. Denies any issues today. Denies SI/HI/AVH. Ruvalcaba Marty catatonia ratings scale score of 12. Chart reviewed, including vital signs, medication administration record, lab results, and nursing notes.   Case discussed with nursing, psychology, psych rehab, and social work in team meeting.   - Patient noted to be tachycardic, laughs inappropriately to herself at times    Patient is seen in the day room, in no acute distress, eating breakfast. Patient is nonverbal today and does not respond to the treatment team. Patient consumes 100% of her meal. Continues to exhibit flat affect, poor eye contact, and psychomotor slowing. Slight improvement in catalepsy and posturing today.

## 2021-08-28 RX ADMIN — Medication 1 MILLIGRAM(S): at 21:39

## 2021-08-28 RX ADMIN — ESCITALOPRAM OXALATE 10 MILLIGRAM(S): 10 TABLET, FILM COATED ORAL at 09:46

## 2021-08-28 RX ADMIN — BUPROPION HYDROCHLORIDE 150 MILLIGRAM(S): 150 TABLET, EXTENDED RELEASE ORAL at 09:03

## 2021-08-28 RX ADMIN — Medication 1 MILLIGRAM(S): at 16:25

## 2021-08-28 RX ADMIN — Medication 1 MILLIGRAM(S): at 09:46

## 2021-08-29 RX ADMIN — Medication 1 MILLIGRAM(S): at 09:25

## 2021-08-29 RX ADMIN — BUPROPION HYDROCHLORIDE 150 MILLIGRAM(S): 150 TABLET, EXTENDED RELEASE ORAL at 09:24

## 2021-08-29 RX ADMIN — Medication 1 MILLIGRAM(S): at 21:20

## 2021-08-29 RX ADMIN — Medication 1 MILLIGRAM(S): at 12:35

## 2021-08-29 RX ADMIN — ESCITALOPRAM OXALATE 10 MILLIGRAM(S): 10 TABLET, FILM COATED ORAL at 09:25

## 2021-08-30 PROCEDURE — 99232 SBSQ HOSP IP/OBS MODERATE 35: CPT

## 2021-08-30 RX ADMIN — BUPROPION HYDROCHLORIDE 150 MILLIGRAM(S): 150 TABLET, EXTENDED RELEASE ORAL at 08:28

## 2021-08-30 RX ADMIN — Medication 2 MILLIGRAM(S): at 13:41

## 2021-08-30 RX ADMIN — Medication 1 MILLIGRAM(S): at 08:28

## 2021-08-30 RX ADMIN — Medication 2 MILLIGRAM(S): at 22:04

## 2021-08-30 RX ADMIN — ESCITALOPRAM OXALATE 10 MILLIGRAM(S): 10 TABLET, FILM COATED ORAL at 08:28

## 2021-08-30 NOTE — BH INPATIENT PSYCHIATRY PROGRESS NOTE - NSBHASSESSSUMMFT_PSY_ALL_CORE
Patient is a 26 year-old female with history of depression and polysubstance use (marijuana, Adderall), brought in by family for worsening anxiety and poor self-care. Patient is tearful and nonverbal. She is noted to be grossly underweight, and has some catatonic features (mutism, staring, poor PO intake) but this may be more related to psychomotor slowing.   PsyHx: Seen by outpatient provider Dr. Merrill.   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 However it was noted that she became overly sedated, and the dose of lorazepam was reduced. Patient minimally verbal but able to respond to questions nonverbally. Patient started on the antidepressant bupropion to target psychomotor slowing. Discussed plan with father and outpatient provider Dr. Merrill who agree with plan to start the antidepressant bupropion.   8/25:  Patient exhibiting worsening symptoms of catatonia, particularly mutism, immobility, and catalepsy. Will resume previous dose of lorazepam. Will monitor catatonia with Ruvalcaba Marty scale. May consider augmentation with ECT at next stage, given her refractory symptoms.    1. Admission status  9.39 (Emergency Admission, extension complete, expires on 8/26)    2. Significant lab findings  UDS positive for THC    3. Psychotropic medications  - Escitalopram 10 mg daily (depression)  	- Home med  - Bupropion  mg daily (psychomotor slowing)  	- Started 8/24  - Lorazepam 2 mg TID (catatonia)   	- Started 8/12, decreased 8/17, decreased 8/23, increased 8/25, increased 8/30  - Discontinued quetiapine - patient with adequate sleep    4. Medical conditions, other medications, consults  None    5. Psychosocial interventions  A) Remaining in contact with father Dr. Knox

## 2021-08-30 NOTE — BH INPATIENT PSYCHIATRY PROGRESS NOTE - NSBHFUPINTERVALHXFT_PSY_A_CORE
Chart reviewed, including vital signs, medication administration record, lab results, and nursing notes.   Case discussed with nursing, psychology, psych rehab, and social work in team meeting.   - Patient with worsening symptoms of catatonia    Patient is seen in the day room, in no acute distress, eating her breakfast. Patient is nonverbal today and is unable to respond to questions. Patient noted to have continued symptoms of catatonia, including catalepsy, ambitendency, withdrawal, mutism, and staring.    Called father Dr. Knox:  - Updated father on patient's condition, discussed plan to increase dose of Ativan  - Father requests that patient have her nails cut and is worried about her weight loss. Hopes she can have her meals supervised.

## 2021-08-31 PROCEDURE — 99231 SBSQ HOSP IP/OBS SF/LOW 25: CPT

## 2021-08-31 RX ADMIN — BUPROPION HYDROCHLORIDE 150 MILLIGRAM(S): 150 TABLET, EXTENDED RELEASE ORAL at 09:11

## 2021-08-31 RX ADMIN — ESCITALOPRAM OXALATE 10 MILLIGRAM(S): 10 TABLET, FILM COATED ORAL at 09:12

## 2021-08-31 RX ADMIN — Medication 2 MILLIGRAM(S): at 13:37

## 2021-08-31 RX ADMIN — Medication 2 MILLIGRAM(S): at 20:33

## 2021-08-31 RX ADMIN — Medication 2 MILLIGRAM(S): at 09:11

## 2021-08-31 NOTE — BH INPATIENT PSYCHIATRY PROGRESS NOTE - NSBHASSESSSUMMFT_PSY_ALL_CORE
Patient is a 26 year-old female with history of depression and polysubstance use (marijuana, Adderall), brought in by family for worsening anxiety and poor self-care. Patient is tearful and nonverbal. She is noted to be grossly underweight, and has some catatonic features (mutism, staring, poor PO intake) but this may be more related to psychomotor slowing.   PsyHx: Seen by outpatient provider Dr. Merrill.   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 However it was noted that she became overly sedated, and the dose of lorazepam was reduced. Patient minimally verbal but able to respond to questions nonverbally. Patient started on the antidepressant bupropion to target psychomotor slowing. Discussed plan with father and outpatient provider Dr. Merrill who agree with plan to start the antidepressant bupropion.   8/25:  Patient exhibiting worsening symptoms of catatonia, particularly mutism, immobility, and catalepsy. Will resume previous dose of lorazepam. Will monitor catatonia with Ruvalcaba Marty scale. May consider augmentation with ECT at next stage, given her refractory symptoms.    1. Admission status  9.39 (Emergency Admission, extension complete, expires on 8/26)    2. Significant lab findings  UDS positive for THC    3. Psychotropic medications  - Escitalopram 10 mg daily (depression)  	- Home med  - Bupropion  mg daily (psychomotor slowing)  	- Started 8/24  - Lorazepam 2 mg TID (catatonia)   	- Started 8/12, decreased 8/17, decreased 8/23, increased 8/25, increased 8/30  - Discontinued quetiapine - patient with adequate sleep    4. Medical conditions, other medications, consults  None    5. Psychosocial interventions  A) Remaining in contact with father Dr. Knox

## 2021-08-31 NOTE — BH INPATIENT PSYCHIATRY PROGRESS NOTE - NSBHFUPINTERVALHXFT_PSY_A_CORE
Chart reviewed, including vital signs, medication administration record, lab results, and nursing notes.   Case discussed with nursing, psychology, psych rehab, and social work in team meeting.   - Patient with worsening symptoms of catatonia    Patient is seen in her room, sitting in front of her meal, minimally responsive, and playing with her food. Patient is nonverbal today, and is unable to answer my questions. Per nursing, she continues to require assistance with feeding and showering. Continued symptoms of catatonia including catalepsy, ambitendency, posturing, and withdrawal.

## 2021-09-01 PROCEDURE — 99233 SBSQ HOSP IP/OBS HIGH 50: CPT

## 2021-09-01 RX ORDER — ARIPIPRAZOLE 15 MG/1
5 TABLET ORAL DAILY
Refills: 0 | Status: DISCONTINUED | OUTPATIENT
Start: 2021-09-01 | End: 2021-09-03

## 2021-09-01 RX ADMIN — BUPROPION HYDROCHLORIDE 150 MILLIGRAM(S): 150 TABLET, EXTENDED RELEASE ORAL at 08:59

## 2021-09-01 RX ADMIN — ESCITALOPRAM OXALATE 10 MILLIGRAM(S): 10 TABLET, FILM COATED ORAL at 08:59

## 2021-09-01 RX ADMIN — ARIPIPRAZOLE 5 MILLIGRAM(S): 15 TABLET ORAL at 12:44

## 2021-09-01 RX ADMIN — Medication 2 MILLIGRAM(S): at 21:48

## 2021-09-01 RX ADMIN — Medication 2 MILLIGRAM(S): at 08:59

## 2021-09-01 RX ADMIN — Medication 2 MILLIGRAM(S): at 12:45

## 2021-09-01 NOTE — BH INPATIENT PSYCHIATRY PROGRESS NOTE - NSBHASSESSSUMMFT_PSY_ALL_CORE
Patient is a 26 year-old female with history of depression and polysubstance use (marijuana, Adderall), brought in by family for worsening anxiety and poor self-care. Patient is tearful and nonverbal. She is noted to be grossly underweight, and has some catatonic features (mutism, staring, poor PO intake) but this may be more related to psychomotor slowing.   PsyHx: Seen by outpatient provider Dr. Merrill.   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 However it was noted that she became overly sedated, and the dose of lorazepam was reduced. Patient minimally verbal but able to respond to questions nonverbally. Patient started on the antidepressant bupropion to target psychomotor slowing. Discussed plan with father and outpatient provider Dr. Merrill who agree with plan to start the antidepressant bupropion.   8/25:  Patient exhibiting worsening symptoms of catatonia, particularly mutism, immobility, and catalepsy, despite resuming previous dose of lorazepam. Will monitor catatonia with Ruvalcaba Marty scale. May consider augmentation with ECT at next stage, given her refractory symptoms.    1. Admission status  9.39 (Emergency Admission, extension complete, expires on 8/26)    2. Significant lab findings  UDS positive for THC    3. Psychotropic medications  - Escitalopram 10 mg daily (depression)  	- Home med  - Aripiprazole 5 mg daily (depression augmentation)  	- Started 9/1  - Lorazepam 2 mg TID (catatonia)   	- Started 8/12, decreased 8/17, decreased 8/23, increased 8/25, increased 8/30  - Discontinued quetiapine - patient with adequate sleep  - Discontinued Bupropion XL - minimal effect      4. Medical conditions, other medications, consults  None    5. Psychosocial interventions  A) Remaining in contact with father Dr. Knox

## 2021-09-01 NOTE — BH INPATIENT PSYCHIATRY PROGRESS NOTE - NSBHFUPINTERVALHXFT_PSY_A_CORE
Chart reviewed, including vital signs, medication administration record, lab results, and nursing notes.   Case discussed with nursing, psychology, psych rehab, and social work in team meeting.   - Patient with worsening symptoms of catatonia    Patient is seen in her room, nonverbal, sitting in bed. She exhibit numerous symptoms of catatonia, including catalepsy, mutism, staring, withdrawal, and ambitendency. She is unable to speak or communicate in any way.  BFCRS score = 16

## 2021-09-01 NOTE — BH INPATIENT PSYCHIATRY PROGRESS NOTE - NSBHATTESTSEENBY_PSY_A_CORE
attending Psychiatrist without NP/Trainee Advancement-Rotation Flap Text: The defect edges were debeveled with a #15 scalpel blade.  Given the location of the defect, shape of the defect and the proximity to free margins an advancement-rotation flap was deemed most appropriate.  Using a sterile surgical marker, an appropriate flap was drawn incorporating the defect and placing the expected incisions within the relaxed skin tension lines where possible. The area thus outlined was incised deep to adipose tissue with a #15 scalpel blade.  The skin margins were undermined to an appropriate distance in all directions utilizing iris scissors.

## 2021-09-02 PROCEDURE — 99232 SBSQ HOSP IP/OBS MODERATE 35: CPT

## 2021-09-02 RX ADMIN — ARIPIPRAZOLE 5 MILLIGRAM(S): 15 TABLET ORAL at 08:43

## 2021-09-02 RX ADMIN — Medication 2 MILLIGRAM(S): at 21:29

## 2021-09-02 RX ADMIN — Medication 2 MILLIGRAM(S): at 14:23

## 2021-09-02 RX ADMIN — Medication 2 MILLIGRAM(S): at 08:42

## 2021-09-02 RX ADMIN — ESCITALOPRAM OXALATE 10 MILLIGRAM(S): 10 TABLET, FILM COATED ORAL at 08:42

## 2021-09-02 NOTE — BH INPATIENT PSYCHIATRY PROGRESS NOTE - NSBHFUPINTERVALHXFT_PSY_A_CORE
Chart reviewed, including vital signs, medication administration record, lab results, and nursing notes.   Case discussed with nursing, psychology, psych rehab, and social work in team meeting.   - Patient with mild improvement in catatonia, attending some groups and going outside    Patient is seen outside sitting at a bench, in no acute distress, amenable to interview. Patient is nonverbal, but is able to nod in response to some questions. Denies any issues. Denies SI/HI/AVH.   BFCRS score = 12 (improvement in catalepsy)

## 2021-09-02 NOTE — BH INPATIENT PSYCHIATRY PROGRESS NOTE - NSBHASSESSSUMMFT_PSY_ALL_CORE
Patient is a 26 year-old female with history of depression and polysubstance use (marijuana, Adderall), brought in by family for worsening anxiety and poor self-care. Patient is tearful and nonverbal. She is noted to be grossly underweight, and has some catatonic features (mutism, staring, poor PO intake) but this may be more related to psychomotor slowing.   PsyHx: Seen by outpatient provider Dr. Merrill.   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 However it was noted that she became overly sedated, and the dose of lorazepam was reduced. Patient minimally verbal but able to respond to questions nonverbally. Patient started on the antidepressant bupropion to target psychomotor slowing. Discussed plan with father and outpatient provider Dr. Merrill who agree with plan to start the antidepressant bupropion.   8/25:  Patient exhibiting worsening symptoms of catatonia, particularly mutism, immobility, and catalepsy, despite resuming previous dose of lorazepam. Will monitor catatonia with Ruvalcaba Marty scale. Bupropion has been ineffective, so it was discontinued and replaced with aripiprazole. May consider augmentation with ECT at next stage, given her refractory symptoms.    1. Admission status  9.27 (converted from 9.39)    2. Significant lab findings  UDS positive for THC    3. Psychotropic medications  - Escitalopram 10 mg daily (depression)  	- Home med  - Aripiprazole 5 mg daily (depression augmentation)  	- Started 9/1  - Lorazepam 2 mg TID (catatonia)   	- Started 8/12, decreased 8/17, decreased 8/23, increased 8/25, increased 8/30  - Discontinued quetiapine - patient with adequate sleep  - Discontinued Bupropion XL - minimal effect      4. Medical conditions, other medications, consults  None    5. Psychosocial interventions  A) Remaining in contact with father Dr. Knox

## 2021-09-03 PROCEDURE — 90853 GROUP PSYCHOTHERAPY: CPT

## 2021-09-03 PROCEDURE — 99232 SBSQ HOSP IP/OBS MODERATE 35: CPT | Mod: 25

## 2021-09-03 RX ORDER — ARIPIPRAZOLE 15 MG/1
10 TABLET ORAL DAILY
Refills: 0 | Status: DISCONTINUED | OUTPATIENT
Start: 2021-09-03 | End: 2021-09-22

## 2021-09-03 RX ADMIN — ESCITALOPRAM OXALATE 10 MILLIGRAM(S): 10 TABLET, FILM COATED ORAL at 08:36

## 2021-09-03 RX ADMIN — ARIPIPRAZOLE 5 MILLIGRAM(S): 15 TABLET ORAL at 08:35

## 2021-09-03 RX ADMIN — Medication 2 MILLIGRAM(S): at 08:35

## 2021-09-03 RX ADMIN — Medication 2 MILLIGRAM(S): at 12:30

## 2021-09-03 RX ADMIN — Medication 2 MILLIGRAM(S): at 20:36

## 2021-09-03 NOTE — BH INPATIENT PSYCHIATRY PROGRESS NOTE - NSBHASSESSSUMMFT_PSY_ALL_CORE
Patient is a 26 year-old female with history of depression and polysubstance use (marijuana, Adderall), brought in by family for worsening anxiety and poor self-care. Patient is tearful and nonverbal. She is noted to be grossly underweight, and has some catatonic features (mutism, staring, poor PO intake) but this may be more related to psychomotor slowing.   PsyHx: Seen by outpatient provider Dr. Merrill.   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 However it was noted that she became overly sedated, and the dose of lorazepam was reduced. Patient minimally verbal but able to respond to questions nonverbally. Patient started on the antidepressant bupropion to target psychomotor slowing. Discussed plan with father and outpatient provider Dr. Merrill who agree with plan to start the antidepressant bupropion.   8/25-9/1:  Patient exhibiting worsening symptoms of catatonia, particularly mutism, immobility, and catalepsy, despite resuming previous dose of lorazepam. Will monitor catatonia with Ruvalcaba Marty scale. Bupropion has been ineffective, so it was discontinued and replaced with aripiprazole. May consider augmentation with ECT at next stage, given her refractory symptoms.  9/2:  Some improvement in catatonia noted since starting aripiprazole, though she is still nonverbal. Will increase dose of aripiprazole.    1. Admission status  9.27 (converted from 9.39)    2. Significant lab findings  UDS positive for THC    3. Psychotropic medications  - Escitalopram 10 mg daily (depression)  	- Home med  - Aripiprazole 10 mg daily (depression augmentation)  	- Started 9/1, inc 9/4  - Lorazepam 2 mg TID (catatonia)   	- Started 8/12, decreased 8/17, decreased 8/23, increased 8/25, increased 8/30  - Discontinued quetiapine - patient with adequate sleep  - Discontinued Bupropion XL - minimal effect      4. Medical conditions, other medications, consults  None    5. Psychosocial interventions  A) Remaining in contact with father Dr. Knox

## 2021-09-03 NOTE — BH INPATIENT PSYCHIATRY PROGRESS NOTE - NSBHLEGALSTATUSDATE_PSY_ALL_CORE
12-Aug-2021 13:39
25-Aug-2021 12:41

## 2021-09-03 NOTE — BH INPATIENT PSYCHIATRY PROGRESS NOTE - NSBHLEGALSTATUSNEW_PSY_ALL_CORE
9.27 (2PC)
9.39 (Emergency)
9.27 (2PC)

## 2021-09-03 NOTE — BH INPATIENT PSYCHIATRY PROGRESS NOTE - NSBHFUPINTERVALHXFT_PSY_A_CORE
Chart reviewed, including vital signs, medication administration record, lab results, and nursing notes.   Case discussed with nursing, psychology, psych rehab, and social work in team meeting.   - Patient with mild improvement in catatonia, attending some groups and going outside    Patient is seen in her room sitting in a chair, in no acute distress and nonverbal. She is able to respond by nodding or shaking her head. She shakes her head I asked if she has any issues. She denies SI/HI/AVH.  Overall the latency of her responses appears improved. We discussed the plan to increase the dose of Abilify.

## 2021-09-03 NOTE — BH TREATMENT PLAN - NSTXSUICIDINTERRN_PSY_ALL_CORE
[place] for changes in mood/ behavior.
Monitor mood and behavior. Observe for signs of SI and SIB. Provide safe and supportive environment.
Monitor mood and behavior. Observe for signs of SI and SIB. Provide safe and supportive environment.
Observe for signs of increased SI and SIB. Provide safe and supportive environment.

## 2021-09-04 RX ADMIN — ESCITALOPRAM OXALATE 10 MILLIGRAM(S): 10 TABLET, FILM COATED ORAL at 09:37

## 2021-09-04 RX ADMIN — Medication 2 MILLIGRAM(S): at 20:43

## 2021-09-04 RX ADMIN — Medication 2 MILLIGRAM(S): at 09:36

## 2021-09-04 RX ADMIN — ARIPIPRAZOLE 10 MILLIGRAM(S): 15 TABLET ORAL at 09:36

## 2021-09-04 RX ADMIN — Medication 2 MILLIGRAM(S): at 13:50

## 2021-09-05 PROCEDURE — 99232 SBSQ HOSP IP/OBS MODERATE 35: CPT

## 2021-09-05 RX ADMIN — ARIPIPRAZOLE 10 MILLIGRAM(S): 15 TABLET ORAL at 09:36

## 2021-09-05 RX ADMIN — Medication 2 MILLIGRAM(S): at 09:37

## 2021-09-05 RX ADMIN — ESCITALOPRAM OXALATE 10 MILLIGRAM(S): 10 TABLET, FILM COATED ORAL at 09:37

## 2021-09-05 RX ADMIN — Medication 2 MILLIGRAM(S): at 20:18

## 2021-09-05 NOTE — BH INPATIENT PSYCHIATRY PROGRESS NOTE - NSBHFUPINTERVALHXFT_PSY_A_CORE
Chart reviewed and case discussed with treatment team. No events reported overnight. Sleep and appetite is fair. Per RN, patient was less catatonic with her than with writer. Patient was able to sit up in bed and shook her head yes and no to questions. With writer patient was barely engaged, slow moving and appeared to try to answer questions by nodding her head but very slightly. Patient was nonverbal. Patient is compliant with medications, no adverse effects reported.

## 2021-09-06 RX ADMIN — ARIPIPRAZOLE 10 MILLIGRAM(S): 15 TABLET ORAL at 08:55

## 2021-09-06 RX ADMIN — Medication 2 MILLIGRAM(S): at 12:42

## 2021-09-06 RX ADMIN — Medication 2 MILLIGRAM(S): at 21:20

## 2021-09-06 RX ADMIN — ESCITALOPRAM OXALATE 10 MILLIGRAM(S): 10 TABLET, FILM COATED ORAL at 08:55

## 2021-09-06 RX ADMIN — Medication 2 MILLIGRAM(S): at 08:55

## 2021-09-07 PROCEDURE — 99232 SBSQ HOSP IP/OBS MODERATE 35: CPT

## 2021-09-07 RX ADMIN — Medication 2 MILLIGRAM(S): at 13:16

## 2021-09-07 RX ADMIN — Medication 2 MILLIGRAM(S): at 08:57

## 2021-09-07 RX ADMIN — ESCITALOPRAM OXALATE 10 MILLIGRAM(S): 10 TABLET, FILM COATED ORAL at 08:57

## 2021-09-07 RX ADMIN — ARIPIPRAZOLE 10 MILLIGRAM(S): 15 TABLET ORAL at 08:56

## 2021-09-07 RX ADMIN — Medication 2 MILLIGRAM(S): at 21:43

## 2021-09-07 RX ADMIN — Medication 2 MILLIGRAM(S): at 18:04

## 2021-09-07 NOTE — BH INPATIENT PSYCHIATRY PROGRESS NOTE - NSBHFUPINTERVALHXFT_PSY_A_CORE
Chart reviewed, including vital signs, medication administration record, lab results, and nursing notes.   Case discussed with nursing, psychology, psych rehab, and social work in team meeting.     Patient is seen in her room, lying in bed, amenable to interview, but nonverbal. She is able to respond to my questions by nodding or shaking her head. She denies any issues. Denies SI/HI/AVH. On exam for catatonia, she continues to exhibit ambitendency, waxy flexibility, passive obedience, mutism, withdrawal, and psychomotor slowing. We discussed a plan to pursue ECT.  Ruvalcaba Marty Catatonia Rating Scale: 12

## 2021-09-07 NOTE — BH INPATIENT PSYCHIATRY PROGRESS NOTE - NSBHASSESSSUMMFT_PSY_ALL_CORE
Patient is a 26 year-old female with history of depression and polysubstance use (marijuana, Adderall), brought in by family for worsening anxiety and poor self-care. Patient is tearful and nonverbal. She is noted to be grossly underweight, and has some catatonic features (mutism, staring, poor PO intake) but this may be more related to psychomotor slowing.   PsyHx: Seen by outpatient provider Dr. Merrill.   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 However it was noted that she became overly sedated, and the dose of lorazepam was reduced. Patient minimally verbal but able to respond to questions nonverbally. Patient started on the antidepressant bupropion to target psychomotor slowing. Discussed plan with father and outpatient provider Dr. Merrill who agree with plan to start the antidepressant bupropion.   8/25-9/1:  Patient exhibiting worsening symptoms of catatonia, particularly mutism, immobility, and catalepsy, despite resuming previous dose of lorazepam. Will monitor catatonia with Ruvalcaba Marty scale. Bupropion has been ineffective, so it was discontinued and replaced with aripiprazole. May consider augmentation with ECT at next stage, given her refractory symptoms.  9/2:  Mild improvement in depressive symptoms since starting aripiprazole, but overall minimal improvement. Will pursue ECT at this time.      1. Admission status  9.27 (converted from 9.39)    2. Significant lab findings  UDS positive for THC    3. Psychotropic medications  - Escitalopram 10 mg daily (depression)  	- Home med  - Aripiprazole 10 mg daily (depression augmentation)  	- Started 9/1, inc 9/4  - Lorazepam 2 mg QID (catatonia)   	- Started 8/12, decreased 8/17, decreased 8/23, increased 8/25, increased 8/30, inc 9/7  - Discontinued quetiapine - patient with adequate sleep  - Discontinued Bupropion XL - minimal effect      4. Medical conditions, other medications, consults  None    5. Psychosocial interventions  A) Remaining in contact with father Dr. Knox

## 2021-09-08 LAB
ANION GAP SERPL CALC-SCNC: 12 MMOL/L — SIGNIFICANT CHANGE UP (ref 7–14)
BASOPHILS # BLD AUTO: 0.05 K/UL — SIGNIFICANT CHANGE UP (ref 0–0.2)
BASOPHILS NFR BLD AUTO: 0.6 % — SIGNIFICANT CHANGE UP (ref 0–2)
BUN SERPL-MCNC: 13 MG/DL — SIGNIFICANT CHANGE UP (ref 7–23)
CALCIUM SERPL-MCNC: 9.3 MG/DL — SIGNIFICANT CHANGE UP (ref 8.4–10.5)
CHLORIDE SERPL-SCNC: 102 MMOL/L — SIGNIFICANT CHANGE UP (ref 98–107)
CO2 SERPL-SCNC: 26 MMOL/L — SIGNIFICANT CHANGE UP (ref 22–31)
CREAT SERPL-MCNC: 0.69 MG/DL — SIGNIFICANT CHANGE UP (ref 0.5–1.3)
EOSINOPHIL # BLD AUTO: 0.19 K/UL — SIGNIFICANT CHANGE UP (ref 0–0.5)
EOSINOPHIL NFR BLD AUTO: 2.3 % — SIGNIFICANT CHANGE UP (ref 0–6)
GLUCOSE SERPL-MCNC: 76 MG/DL — SIGNIFICANT CHANGE UP (ref 70–99)
HCG SERPL-ACNC: <5 MIU/ML — SIGNIFICANT CHANGE UP
HCT VFR BLD CALC: 35.5 % — SIGNIFICANT CHANGE UP (ref 34.5–45)
HGB BLD-MCNC: 11.7 G/DL — SIGNIFICANT CHANGE UP (ref 11.5–15.5)
IANC: 4.77 K/UL — SIGNIFICANT CHANGE UP (ref 1.5–8.5)
IMM GRANULOCYTES NFR BLD AUTO: 0.2 % — SIGNIFICANT CHANGE UP (ref 0–1.5)
LYMPHOCYTES # BLD AUTO: 2.47 K/UL — SIGNIFICANT CHANGE UP (ref 1–3.3)
LYMPHOCYTES # BLD AUTO: 30.1 % — SIGNIFICANT CHANGE UP (ref 13–44)
MCHC RBC-ENTMCNC: 30.3 PG — SIGNIFICANT CHANGE UP (ref 27–34)
MCHC RBC-ENTMCNC: 33 GM/DL — SIGNIFICANT CHANGE UP (ref 32–36)
MCV RBC AUTO: 92 FL — SIGNIFICANT CHANGE UP (ref 80–100)
MONOCYTES # BLD AUTO: 0.7 K/UL — SIGNIFICANT CHANGE UP (ref 0–0.9)
MONOCYTES NFR BLD AUTO: 8.5 % — SIGNIFICANT CHANGE UP (ref 2–14)
NEUTROPHILS # BLD AUTO: 4.77 K/UL — SIGNIFICANT CHANGE UP (ref 1.8–7.4)
NEUTROPHILS NFR BLD AUTO: 58.3 % — SIGNIFICANT CHANGE UP (ref 43–77)
NRBC # BLD: 0 /100 WBCS — SIGNIFICANT CHANGE UP
NRBC # FLD: 0 K/UL — SIGNIFICANT CHANGE UP
PLATELET # BLD AUTO: 268 K/UL — SIGNIFICANT CHANGE UP (ref 150–400)
POTASSIUM SERPL-MCNC: 3.7 MMOL/L — SIGNIFICANT CHANGE UP (ref 3.5–5.3)
POTASSIUM SERPL-SCNC: 3.7 MMOL/L — SIGNIFICANT CHANGE UP (ref 3.5–5.3)
RBC # BLD: 3.86 M/UL — SIGNIFICANT CHANGE UP (ref 3.8–5.2)
RBC # FLD: 13.9 % — SIGNIFICANT CHANGE UP (ref 10.3–14.5)
SODIUM SERPL-SCNC: 140 MMOL/L — SIGNIFICANT CHANGE UP (ref 135–145)
WBC # BLD: 8.2 K/UL — SIGNIFICANT CHANGE UP (ref 3.8–10.5)
WBC # FLD AUTO: 8.2 K/UL — SIGNIFICANT CHANGE UP (ref 3.8–10.5)

## 2021-09-08 PROCEDURE — 93010 ELECTROCARDIOGRAM REPORT: CPT

## 2021-09-08 PROCEDURE — 99232 SBSQ HOSP IP/OBS MODERATE 35: CPT

## 2021-09-08 RX ADMIN — ESCITALOPRAM OXALATE 10 MILLIGRAM(S): 10 TABLET, FILM COATED ORAL at 09:00

## 2021-09-08 RX ADMIN — ARIPIPRAZOLE 10 MILLIGRAM(S): 15 TABLET ORAL at 09:00

## 2021-09-08 RX ADMIN — Medication 2 MILLIGRAM(S): at 14:09

## 2021-09-08 NOTE — BH INPATIENT PSYCHIATRY PROGRESS NOTE - NSBHFUPINTERVALHXFT_PSY_A_CORE
Chart reviewed, including vital signs, medication administration record, lab results, and nursing notes.   Case discussed with nursing, psychology, psych rehab, and social work in team meeting.     Patient is seen in the day room, in no acute distress, but uncooperative with interview. Patient is nonverbal and does not respond to my questions. She has seen shivering, but refuses to answer when I asked if she was cold.

## 2021-09-08 NOTE — BH INPATIENT PSYCHIATRY PROGRESS NOTE - NSBHASSESSSUMMFT_PSY_ALL_CORE
Patient is a 26 year-old female with history of depression and polysubstance use (marijuana, Adderall), brought in by family for worsening anxiety and poor self-care. Patient is tearful and nonverbal. She is noted to be grossly underweight, and has some catatonic features (mutism, staring, poor PO intake) but this may be more related to psychomotor slowing.   PsyHx: Seen by outpatient provider Dr. Merrill.   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 However it was noted that she became overly sedated, and the dose of lorazepam was reduced. Patient minimally verbal but able to respond to questions nonverbally. Patient started on the antidepressant bupropion to target psychomotor slowing. Discussed plan with father and outpatient provider Dr. Merrill who agree with plan to start the antidepressant bupropion.   8/25-9/1:  Patient exhibiting worsening symptoms of catatonia, particularly mutism, immobility, and catalepsy, despite resuming previous dose of lorazepam. Will monitor catatonia with Ruvalcaba Marty scale. Bupropion has been ineffective, so it was discontinued and replaced with aripiprazole. May consider augmentation with ECT at next stage, given her refractory symptoms.  9/2:  Mild improvement in depressive symptoms since starting aripiprazole, but overall minimal improvement. Will pursue ECT at this time. Also obtained neurology consult to r/o autoimmune encephalitis.    1. Admission status  9.27 (converted from 9.39)    2. Significant lab findings  UDS positive for THC    3. Psychotropic medications  - Escitalopram 10 mg daily (depression)  	- Home med  - Aripiprazole 10 mg daily (depression augmentation)  	- Started 9/1, inc 9/4  - Lorazepam 2 mg TID (catatonia)   	- Started 8/12, decreased 8/17, decreased 8/23, increased 8/25, increased 8/30, inc 9/7, dec 9/8  - Discontinued quetiapine - patient with adequate sleep  - Discontinued Bupropion XL - minimal effect      4. Medical conditions, other medications, consults  A) Concern for autoimmune encephalitis - given recurrent catatonia in a young patient  - Neurology consult ordered  - Plan to obtain MRI WWO contrast, but patient unable to consent at this time. Will involve legal.  B) ECT  - ECT consult ordered    5. Psychosocial interventions  A) Remaining in contact with father Dr. Knox

## 2021-09-09 LAB
APTT BLD: 35.8 SEC — SIGNIFICANT CHANGE UP (ref 27–36.3)
C3 SERPL-MCNC: 111 MG/DL — SIGNIFICANT CHANGE UP (ref 90–180)
C4 SERPL-MCNC: 22 MG/DL — SIGNIFICANT CHANGE UP (ref 10–40)
INR BLD: 0.98 RATIO — SIGNIFICANT CHANGE UP (ref 0.88–1.16)
PROTHROM AB SERPL-ACNC: 11.3 SEC — SIGNIFICANT CHANGE UP (ref 10.6–13.6)
THYROPEROXIDASE AB SERPL-ACNC: <10 IU/ML — SIGNIFICANT CHANGE UP

## 2021-09-09 PROCEDURE — 99232 SBSQ HOSP IP/OBS MODERATE 35: CPT

## 2021-09-09 PROCEDURE — 70553 MRI BRAIN STEM W/O & W/DYE: CPT | Mod: 26

## 2021-09-09 RX ADMIN — ARIPIPRAZOLE 10 MILLIGRAM(S): 15 TABLET ORAL at 08:56

## 2021-09-09 RX ADMIN — ESCITALOPRAM OXALATE 10 MILLIGRAM(S): 10 TABLET, FILM COATED ORAL at 08:56

## 2021-09-09 RX ADMIN — Medication 2 MILLIGRAM(S): at 08:56

## 2021-09-09 RX ADMIN — Medication 2 MILLIGRAM(S): at 20:35

## 2021-09-09 RX ADMIN — Medication 2 MILLIGRAM(S): at 12:22

## 2021-09-09 NOTE — ECT CONSULT NOTE - CURRENT MEDICATION
MEDICATIONS  (STANDING):  ARIPiprazole 10 milliGRAM(s) Oral daily  escitalopram 10 milliGRAM(s) Oral daily  LORazepam     Tablet 2 milliGRAM(s) Oral three times a day    MEDICATIONS  (PRN):  haloperidol     Tablet 2 milliGRAM(s) Oral every 6 hours PRN agitation  haloperidol    Injectable 5 milliGRAM(s) IntraMuscular once PRN severe agitation

## 2021-09-09 NOTE — ECT CONSULT NOTE - NSECTMENTALSTATUSEXAM_PSY_ALL_CORE
Patient was conscious, partially cooperative with normal gait. No rigidity. Marked psychomotor retardation present. Mood: Cannot be determined, Anxious, Affect: apathetic, restricted range. Thought process, thought content, Perception could not be established. Insight and judgement: poor. Impulse control: fair at this time. Ambitendency noted. Patient was able to correctly put in the numbers of clock in a [place] made by the writer.

## 2021-09-09 NOTE — UM REPORT PROGRESS NOTE - NSUMSWNOTE_GEN_A_CORE FT
Pt is 26 year old female, [place]-American, currently enrolled in school, and living with family. Pt has a history of previous psychiatric hospitalizations at [facility] (4/1/19-4/23/19, 10/11/20-10/29/20), dx of Mood disorder, Alcohol/Substance Use Disorder and receiving treatment with [facility]- Dr. Merrill. Pt is currently symptomatic, mute, unable to engage with providers and minimally interactive with peers/treatment team. Pt requires a lot of redirection, support and encouragement.
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
## 2021-09-09 NOTE — BH INPATIENT PSYCHIATRY PROGRESS NOTE - NSBHASSESSSUMMFT_PSY_ALL_CORE
Patient is a 26 year-old female with history of depression and polysubstance use (marijuana, Adderall), brought in by family for worsening anxiety and poor self-care. Patient is tearful and nonverbal. She is noted to be grossly underweight, and has some catatonic features (mutism, staring, poor PO intake) but this may be more related to psychomotor slowing.   PsyHx: Seen by outpatient provider Dr. Merrill.   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 However it was noted that she became overly sedated, and the dose of lorazepam was reduced. Patient minimally verbal but able to respond to questions nonverbally. Patient started on the antidepressant bupropion to target psychomotor slowing. Discussed plan with father and outpatient provider Dr. Merrill who agree with plan to start the antidepressant bupropion.   8/25-9/1:  Patient exhibiting worsening symptoms of catatonia, particularly mutism, immobility, and catalepsy, despite resuming previous dose of lorazepam. Will monitor catatonia with Ruvalcaba Marty scale. Bupropion has been ineffective, so it was discontinued and replaced with aripiprazole. May consider augmentation with ECT at next stage, given her refractory symptoms.  9/2:  Mild improvement in depressive symptoms since starting aripiprazole, but overall minimal improvement. Will pursue ECT at this time. Also obtained neurology consult to r/o autoimmune encephalitis; ordering MRI WWO contrast and EEG.    1. Admission status  9.27 (converted from 9.39)    2. Significant lab findings  UDS positive for THC    3. Psychotropic medications  - Escitalopram 10 mg daily (depression)  	- Home med  - Aripiprazole 10 mg daily (depression augmentation)  	- Started 9/1, inc 9/4  - Lorazepam 2 mg TID (catatonia)   	- Started 8/12, decreased 8/17, decreased 8/23, increased 8/25, increased 8/30, inc 9/7, dec 9/8  - Discontinued quetiapine - patient with adequate sleep  - Discontinued Bupropion XL - minimal effect      4. Medical conditions, other medications, consults  A) Concern for autoimmune encephalitis - given recurrent catatonia in a young patient  Per neurology recommendations  1.	MRI brain with and without contrast.  2.	Pelvic ultrasound or CT abdomen and pelvis to evaluate for teratoma.  3.	Please send aPTT and PT/INR for possible LP, if needed.  4.	Please send serum autoimmune encephalitis panel.  5.	Please send: anti-cardiolipin, p-ANCA, c-ANCA, anti-TPO, anti-thyroglobulin, NICK, anti-ß2 microglobulin, anti-dsDNA, C3 level, and C4 level.  6.	Routine EEG.  7.	ECT would be an appropriate treatment for catatonia, whether it were due to depression or autoimmune encephalitis.    B) ECT  - Consent for bifrontal ECT obtained, will begin series on Monday  - NPO and hold lorazepam on evenings and mornings before ECT    5. Psychosocial interventions  A) Remaining in contact with father Dr. Knox

## 2021-09-09 NOTE — BH INPATIENT PSYCHIATRY PROGRESS NOTE - NSBHFUPINTERVALHXFT_PSY_A_CORE
Chart reviewed, including vital signs, medication administration record, lab results, and nursing notes.   Case discussed with nursing, psychology, psych rehab, and social work in team meeting.     Patient is seen lying in bed, but awakens and is amenable to interview. We discuss a plan to pursue ECT treatment on Monday, as well as obtain an MRI with and without contrast. Consent provided for MRI contrast. The patient is nonverbal, but is able to not her head nod or shake her head in response to questions. Denies SI/HI/AVH.

## 2021-09-09 NOTE — ECT CONSULT NOTE - NSECTASSESSRECOMM_PSY_ALL_CORE
Patient is a 27 yo female presenting with 3rd episode of catatonia/psychiatric hospitalization in last 3 years. Patient has good inter-episode functioning. She has been diagnosed with Depression with psychotic features in past. Both the previous episodes and the current episode are associated with substance use (Adderall, Cannabis). The catatonia during this episode has not responded to Ativan. ECT is the treatment of choice for catatonia not responding to Benzodiazepines. Given patient's age, female gender possibility of autoimmune encephalitis should be explored in differential diagnosis.  Patient is a 27 yo female presenting with 3rd episode of catatonia/psychiatric hospitalization in last 3 years. Patient has good inter-episode functioning. She has been diagnosed with Depression with psychotic features in past. Both the previous episodes and the current episode are associated with substance use (Adderall, Cannabis). The catatonia during this episode has not responded to Ativan. ECT is the treatment of choice for catatonia not responding to Benzodiazepines. Given patient's age, female gender possibility of autoimmune encephalitis should be explored in differential diagnosis. However, it is not a contraindication for ECT. Catatonia is a syndrome and not a primary diagnosis. The underlying condition should still be explored and treated accordingly. While the primary team continues to explore the etiology, ECT can be initiated. Patient lacks capacity to appreciate risk vs benefits of ECT. Above was discussed in detail with father (Dr. Garces), including risk vs benefits, alternative treatments and placement options. He was emailed the consent form. On Sept 10th, 2021, he provided telephonic consent for acute course of Bifrontal ECT as well as anesthesia, which was witnessed by RN. Neurology recommendations were appreciated.
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
## 2021-09-09 NOTE — ECT CONSULT NOTE - NSICDXBHSECONDARYDX_PSY_ALL_CORE
Severe episode of recurrent major depressive disorder, with psychotic features   F33.3  Amphetamine abuse   F15.10  Cannabis abuse   F12.10  Anxiety   F41.9  Major depressive disorder   F32.9

## 2021-09-09 NOTE — UM REPORT PROGRESS NOTE - NSUMSWACTION_GEN_A_CORE FT
SW has met with pt and psychiatrist to speak with pt. SW has been in touch with pt father to provide support, updates on daughter care and minimal progress. DALY has learned from the team that pt family/team have been in touch and there is now an agreement with providing pt with ECT treatment as medication has not helped as much in clinical improvements. DALY will continue to provide updates to family as well as determine appropriate aftercare plan (return to providers vs. Centers).  SW has met with pt and psychiatrist to speak with pt. SW has been in touch with pt father to provide support and updates. DALY has learned from the team that pt has been improving minimally with recent changes to treatment plan (ECT session just started yesterday, pt seen more engaged, ADL's improved and smiling looking less internally preoccupied). DALY will continue to provide updates to family as well as determine appropriate aftercare plan (return to providers vs. PHP/ECT ).

## 2021-09-09 NOTE — BH CHART NOTE - NSEVENTNOTEFT_PSY_ALL_CORE
The patient was seen today to assess the appropriateness of a course of ECT treatments.      Briefly, the patient is a 26 year-old female with history of MDD with psychosis and polysubstance use (marijuana, Adderall), brought in by family for worsening anxiety and poor self-care. On exam today.  pt is motionless,  barely able to open her eyes with this writer's approach.  Pt vocalized few times though did not make any sentence or did not speak to this writer.  Pt appears severely catatonic.     The treatment team feels the patient’s symptoms would benefit from ECT, with little risk.  The patient has been medically cleared for ECT.  The treatment was discussed with the patient and pt's parents.  The patient was unable to verbalize assent or dissent to the treatment due to  psychiatric condition.  The patient’s father signed the consent on pt's behalf. The pt shows significant symptoms of catatonia, psychosis, depression and was unable to engage in discussion.  It is my view that the patient does not have capacity at this time to decide on a course of ECT treatments, but has not dissented to the treatment.  In this case, the benefits of the treatment outweigh the risks, and, as the family, as health care proxy or next of kin, is also in agreement, that the treatment team can move forward with a course of ECT.

## 2021-09-10 LAB — ANA TITR SER: NEGATIVE — SIGNIFICANT CHANGE UP

## 2021-09-10 PROCEDURE — 93010 ELECTROCARDIOGRAM REPORT: CPT

## 2021-09-10 PROCEDURE — 99233 SBSQ HOSP IP/OBS HIGH 50: CPT

## 2021-09-10 PROCEDURE — 76856 US EXAM PELVIC COMPLETE: CPT | Mod: 26

## 2021-09-10 RX ADMIN — Medication 2 MILLIGRAM(S): at 14:11

## 2021-09-10 RX ADMIN — Medication 2 MILLIGRAM(S): at 20:29

## 2021-09-10 RX ADMIN — ARIPIPRAZOLE 10 MILLIGRAM(S): 15 TABLET ORAL at 09:21

## 2021-09-10 RX ADMIN — ESCITALOPRAM OXALATE 10 MILLIGRAM(S): 10 TABLET, FILM COATED ORAL at 09:22

## 2021-09-10 RX ADMIN — Medication 2 MILLIGRAM(S): at 09:21

## 2021-09-10 NOTE — BH INPATIENT PSYCHIATRY PROGRESS NOTE - NSBHASSESSSUMMFT_PSY_ALL_CORE
Patient is a 26 year-old female with history of depression and polysubstance use (marijuana, Adderall), brought in by family for worsening anxiety and poor self-care. Patient is tearful and nonverbal. She is noted to be grossly underweight, and has some catatonic features (mutism, staring, poor PO intake) but this may be more related to psychomotor slowing.   PsyHx: Seen by outpatient provider Dr. Merrill.   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 However it was noted that she became overly sedated, and the dose of lorazepam was reduced. Patient minimally verbal but able to respond to questions nonverbally. Patient started on the antidepressant bupropion to target psychomotor slowing. Discussed plan with father and outpatient provider Dr. Merrill who agree with plan to start the antidepressant bupropion.   8/25-9/1:  Patient exhibiting worsening symptoms of catatonia, particularly mutism, immobility, and catalepsy, despite resuming previous dose of lorazepam. Will monitor catatonia with Ruvalcaba Marty scale. Bupropion has been ineffective, so it was discontinued and replaced with aripiprazole. May consider augmentation with ECT at next stage, given her refractory symptoms.  9/2:  Mild improvement in depressive symptoms since starting aripiprazole, but overall minimal improvement. Will pursue ECT at this time.   Obtained neurology consult to r/o autoimmune encephalitis; workup in plan    1. Admission status  9.27 (converted from 9.39)    2. Significant lab findings  UDS positive for THC    3. Psychotropic medications  - Escitalopram 10 mg daily (depression)  	- Home med  - Aripiprazole 10 mg daily (depression augmentation)  	- Started 9/1, inc 9/4  - Lorazepam 2 mg TID (catatonia)   	- Started 8/12, decreased 8/17, decreased 8/23, increased 8/25, increased 8/30, inc 9/7, dec 9/8  - Discontinued quetiapine - patient with adequate sleep  - Discontinued Bupropion XL - minimal effect      4. Medical conditions, other medications, consults  A) Concern for autoimmune encephalitis - given recurrent catatonia in a young patient  MRI WWO contrast - normal  Pelvic ultrasound - for today  EEG scheduled for Tuesday 9/14 1pm  - Pending workup  aPTT and PT/INR  Autoimmune encephalitis panel.  anti-cardiolipin, p-ANCA, c-ANCA, anti-TPO, anti-thyroglobulin, NICK, anti-ß2 microglobulin, anti-dsDNA, C3 level, and C4 level.    B) ECT  - Consent for bifrontal ECT obtained, will begin series on Monday  - NPO and hold lorazepam on evenings and mornings before ECT    5. Psychosocial interventions  A) Remaining in contact with father Dr. Knox

## 2021-09-10 NOTE — BH INPATIENT PSYCHIATRY PROGRESS NOTE - NSBHFUPINTERVALHXFT_PSY_A_CORE
Chart reviewed, including vital signs, medication administration record, lab results, and nursing notes.   Case discussed with nursing, psychology, psych rehab, and social work in team meeting.     Patient is seen in her room, in no acute distress, nonverbal, but cooperative. She is experiencing hiccups this morning. She reports stable sleep and appetite, denies medication side effects, and denies SI/HI/AVH. I informed her that her MRI was normal, and that she will be receiving a pelvic ultrasound today. Plan to pursue electroconvulsive therapy next week.

## 2021-09-11 RX ADMIN — ARIPIPRAZOLE 10 MILLIGRAM(S): 15 TABLET ORAL at 09:18

## 2021-09-11 RX ADMIN — Medication 2 MILLIGRAM(S): at 09:18

## 2021-09-11 RX ADMIN — Medication 2 MILLIGRAM(S): at 20:41

## 2021-09-11 RX ADMIN — Medication 2 MILLIGRAM(S): at 12:24

## 2021-09-11 RX ADMIN — ESCITALOPRAM OXALATE 10 MILLIGRAM(S): 10 TABLET, FILM COATED ORAL at 09:18

## 2021-09-12 LAB
DSDNA AB SER-ACNC: <12 IU/ML — SIGNIFICANT CHANGE UP
HCG SERPL-ACNC: <5 MIU/ML — SIGNIFICANT CHANGE UP
SARS-COV-2 RNA SPEC QL NAA+PROBE: SIGNIFICANT CHANGE UP

## 2021-09-12 RX ADMIN — Medication 2 MILLIGRAM(S): at 12:34

## 2021-09-12 RX ADMIN — ARIPIPRAZOLE 10 MILLIGRAM(S): 15 TABLET ORAL at 09:07

## 2021-09-12 RX ADMIN — ESCITALOPRAM OXALATE 10 MILLIGRAM(S): 10 TABLET, FILM COATED ORAL at 09:07

## 2021-09-12 RX ADMIN — Medication 2 MILLIGRAM(S): at 20:45

## 2021-09-12 RX ADMIN — Medication 2 MILLIGRAM(S): at 09:07

## 2021-09-13 LAB
B2 GLYCOPROT1 AB SER QL: NEGATIVE — SIGNIFICANT CHANGE UP
CARDIOLIPIN AB SER-ACNC: NEGATIVE — SIGNIFICANT CHANGE UP

## 2021-09-13 PROCEDURE — 99223 1ST HOSP IP/OBS HIGH 75: CPT | Mod: 25

## 2021-09-13 PROCEDURE — 90853 GROUP PSYCHOTHERAPY: CPT

## 2021-09-13 RX ADMIN — Medication 2 MILLIGRAM(S): at 22:28

## 2021-09-13 RX ADMIN — Medication 2 MILLIGRAM(S): at 08:36

## 2021-09-13 RX ADMIN — Medication 2 MILLIGRAM(S): at 13:17

## 2021-09-13 RX ADMIN — ESCITALOPRAM OXALATE 10 MILLIGRAM(S): 10 TABLET, FILM COATED ORAL at 08:35

## 2021-09-13 RX ADMIN — ARIPIPRAZOLE 10 MILLIGRAM(S): 15 TABLET ORAL at 08:35

## 2021-09-13 NOTE — BH INPATIENT PSYCHIATRY PROGRESS NOTE - NSBHASSESSSUMMFT_PSY_ALL_CORE
Patient is a 26 year-old female with history of depression and polysubstance use (marijuana, Adderall), brought in by family for worsening anxiety and poor self-care. Patient is tearful and nonverbal. She is noted to be grossly underweight, and has some catatonic features (mutism, staring, poor PO intake) but this may be more related to psychomotor slowing.   PsyHx: Seen by outpatient provider Dr. Merrill.   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 However it was noted that she became overly sedated, and the dose of lorazepam was reduced. Patient minimally verbal but able to respond to questions nonverbally. Patient started on the antidepressant bupropion to target psychomotor slowing. Discussed plan with father and outpatient provider Dr. Merrill who agree with plan to start the antidepressant bupropion.   8/25-9/1:  Patient exhibiting worsening symptoms of catatonia, particularly mutism, immobility, and catalepsy, despite resuming previous dose of lorazepam. Will monitor catatonia with Ruvalcaba Marty scale. Bupropion has been ineffective, so it was discontinued and replaced with aripiprazole. May consider augmentation with ECT at next stage, given her refractory symptoms.  9/2:  Mild improvement in depressive symptoms since starting aripiprazole, but overall minimal improvement. Will pursue ECT at this time.   Obtained neurology consult to r/o autoimmune encephalitis; workup in plan  9/13:  Pt remains catatonic.  Father postponed the first ECT treatment to Wednesday.    1. Admission status  9.27 (converted from 9.39)    2. Significant lab findings  UDS positive for THC    3. Psychotropic medications  - Escitalopram 10 mg daily (depression)  	- Home med  - Aripiprazole 10 mg daily (depression augmentation)  	- Started 9/1, inc 9/4  - Lorazepam 2 mg TID (catatonia)   	- Started 8/12, decreased 8/17, decreased 8/23, increased 8/25, increased 8/30, inc 9/7, dec 9/8  - Discontinued quetiapine - patient with adequate sleep  - Discontinued Bupropion XL - minimal effect      4. Medical conditions, other medications, consults  A) Concern for autoimmune encephalitis - given recurrent catatonia in a young patient  MRI WWO contrast - normal  Pelvic ultrasound - for today  EEG scheduled for Tuesday 9/14 1pm  - Pending workup  aPTT and PT/INR  Autoimmune encephalitis panel.  anti-cardiolipin, p-ANCA, c-ANCA, anti-TPO, anti-thyroglobulin, NICK, anti-ß2 microglobulin, anti-dsDNA, C3 level, and C4 level.    B) ECT  - Consent for bifrontal ECT obtained, will begin series on Wednesday.  Father cancelled today's ECT,  reports he would consent to Wednesday start.  - NPO and hold lorazepam on evenings and mornings before ECT    5. Psychosocial interventions  A) Remaining in contact with father Dr. Knox

## 2021-09-13 NOTE — BH INPATIENT PSYCHIATRY PROGRESS NOTE - MSE UNSTRUCTURED FT
Pt is laying in the bed,  eyes are half closed though is able to acknowledges this writer.  Fairly groomed.  Limited movement.  No vocalization noted. Denies SI and denies A/V hallucinations. The rest of MSE is unable to complete due to her catatonic symptoms.

## 2021-09-13 NOTE — BH INPATIENT PSYCHIATRY PROGRESS NOTE - NSBHFUPINTERVALHXFT_PSY_A_CORE
Staff report pt continues to spend most of time in her bed,  requires significant assistance to attend to ADL and also to eat.  Pt was visited by her family during this weekend.  The family report that she was smiling,  answering questions and played games.  father this morning, called the unit and requested to cancel this morning's ECT.  This writer spoke to the father re: this decision.  father states that pt's mother is crying and does want to postpone treatment for Wednesday though father understand the importance of ECT treatment to alleviate catatonic symptoms.    On exams,  pt shook her head and nodded for "no" and "yes".  Answered no to SI,  answered no to hallucinations. The rest of questions were not answered.

## 2021-09-14 LAB
AUTO DIFF PNL BLD: NEGATIVE — SIGNIFICANT CHANGE UP
C-ANCA SER-ACNC: NEGATIVE — SIGNIFICANT CHANGE UP
HCG UR QL: NEGATIVE — SIGNIFICANT CHANGE UP
P-ANCA SER-ACNC: NEGATIVE — SIGNIFICANT CHANGE UP

## 2021-09-14 PROCEDURE — 99232 SBSQ HOSP IP/OBS MODERATE 35: CPT

## 2021-09-14 RX ADMIN — Medication 2 MILLIGRAM(S): at 21:42

## 2021-09-14 RX ADMIN — Medication 2 MILLIGRAM(S): at 08:54

## 2021-09-14 RX ADMIN — ESCITALOPRAM OXALATE 10 MILLIGRAM(S): 10 TABLET, FILM COATED ORAL at 08:53

## 2021-09-14 RX ADMIN — ARIPIPRAZOLE 10 MILLIGRAM(S): 15 TABLET ORAL at 08:53

## 2021-09-14 NOTE — BH INPATIENT PSYCHIATRY PROGRESS NOTE - NSBHFUPINTERVALHXFT_PSY_A_CORE
Chart reviewed, including vital signs, medication administration record, lab results, and nursing notes.   Case discussed with nursing, psychology, psych rehab, and social work in team meeting.   - Patient had a panic attack on Monday, per father    Patient is seen in the day room, smiling, in no acute distress, amenable to interview. Notable, the patient’s affect is much brighter, and she is more responsive today. States that yesterday she “slept and ate“. Acknowledges that her parents visited. Reports stable sleep and appetite. Denies medication side effects. Deny SI/HI/AVH. We discuss the plan for ECT tomorrow.

## 2021-09-14 NOTE — BH INPATIENT PSYCHIATRY PROGRESS NOTE - NSBHASSESSSUMMFT_PSY_ALL_CORE
Patient is a 26 year-old female with history of depression and polysubstance use (marijuana, Adderall), brought in by family for worsening anxiety and poor self-care. Patient is tearful and nonverbal. She is noted to be grossly underweight, and has some catatonic features (mutism, staring, poor PO intake) but this may be more related to psychomotor slowing.   PsyHx: Seen by outpatient provider Dr. Merrill.   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 However it was noted that she became overly sedated, and the dose of lorazepam was reduced. Patient minimally verbal but able to respond to questions nonverbally. Patient started on the antidepressant bupropion to target psychomotor slowing. Discussed plan with father and outpatient provider Dr. Merrill who agree with plan to start the antidepressant bupropion.   8/25-9/1:  Patient exhibiting worsening symptoms of catatonia, particularly mutism, immobility, and catalepsy, despite resuming previous dose of lorazepam. Will monitor catatonia with Ruvalcaba Marty scale. Bupropion has been ineffective, so it was discontinued and replaced with aripiprazole. May consider augmentation with ECT at next stage, given her refractory symptoms.  9/2-9/14  Mild improvement in depressive symptoms since starting aripiprazole, but overall minimal improvement. Will pursue ECT at this time.   Obtained neurology consult to r/o autoimmune encephalitis; workup in plan  Pt remains catatonic.  Father postponed the first ECT treatment to Wednesday.    1. Admission status  9.27 (converted from 9.39)    2. Significant lab findings  UDS positive for THC    3. Psychotropic medications  - Escitalopram 10 mg daily (depression)  	- Home med  - Aripiprazole 10 mg daily (depression augmentation)  	- Started 9/1, inc 9/4  - Lorazepam 2 mg TID (catatonia)   	- Started 8/12, decreased 8/17, decreased 8/23, increased 8/25, increased 8/30, inc 9/7, dec 9/8  - Discontinued quetiapine - patient with adequate sleep  - Discontinued Bupropion XL - minimal effect      4. 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  anti-cardiolipin, p-ANCA, c-ANCA, anti-TPO, anti-thyroglobulin, NICK, anti-ß2 microglobulin, anti-dsDNA, C3 level, and C4 level.    B) ECT  - Consent for bifrontal ECT obtained, will begin series on Wednesday  - NPO and hold lorazepam on evenings and mornings before ECT    5. Psychosocial interventions  A) Remaining in contact with father Dr. Knox

## 2021-09-15 PROCEDURE — 90870 ELECTROCONVULSIVE THERAPY: CPT

## 2021-09-15 PROCEDURE — 99232 SBSQ HOSP IP/OBS MODERATE 35: CPT | Mod: 25

## 2021-09-15 RX ORDER — FLUMAZENIL 0.1 MG/ML
0.4 VIAL (ML) INTRAVENOUS ONCE
Refills: 0 | Status: COMPLETED | OUTPATIENT
Start: 2021-09-15 | End: 2021-09-15

## 2021-09-15 RX ORDER — MIDAZOLAM HYDROCHLORIDE 1 MG/ML
2 INJECTION, SOLUTION INTRAMUSCULAR; INTRAVENOUS ONCE
Refills: 0 | Status: DISCONTINUED | OUTPATIENT
Start: 2021-09-15 | End: 2021-09-15

## 2021-09-15 RX ADMIN — Medication 0.4 MILLIGRAM(S): at 08:22

## 2021-09-15 RX ADMIN — MIDAZOLAM HYDROCHLORIDE 2 MILLIGRAM(S): 1 INJECTION, SOLUTION INTRAMUSCULAR; INTRAVENOUS at 08:27

## 2021-09-15 RX ADMIN — ESCITALOPRAM OXALATE 10 MILLIGRAM(S): 10 TABLET, FILM COATED ORAL at 09:48

## 2021-09-15 RX ADMIN — ARIPIPRAZOLE 10 MILLIGRAM(S): 15 TABLET ORAL at 09:47

## 2021-09-15 RX ADMIN — Medication 2 MILLIGRAM(S): at 09:48

## 2021-09-15 NOTE — BH INPATIENT PSYCHIATRY PROGRESS NOTE - NSBHFUPINTERVALHXFT_PSY_A_CORE
Chart reviewed, including vital signs, medication administration record, lab results, and nursing notes.   Case discussed with nursing, psychology, psych rehab, and social work in team meeting.   - Patient more responsive and verbal    Patient is seen in her bed, sedated, but awakens and is amenable to interview. She is more verbal and able to respond in complete sentences. Denies any side effects from ECT. Discussed plan to continue ECT and pursue EEG tomorrow. She responds "do I have to?" when I tell her about the EEG. Discussed plan to taper lorazepam.

## 2021-09-15 NOTE — BH INPATIENT PSYCHIATRY PROGRESS NOTE - NSBHASSESSSUMMFT_PSY_ALL_CORE
Patient is a 26 year-old female with history of depression and polysubstance use (marijuana, Adderall), brought in by family for worsening anxiety and poor self-care. Patient is tearful and nonverbal. She is noted to be grossly underweight, and has some catatonic features (mutism, staring, poor PO intake) but this may be more related to psychomotor slowing.   PsyHx: Seen by outpatient provider Dr. Merrill.   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 However it was noted that she became overly sedated, and the dose of lorazepam was reduced. Patient minimally verbal but able to respond to questions nonverbally. Patient started on the antidepressant bupropion to target psychomotor slowing. Discussed plan with father and outpatient provider Dr. Merrill who agree with plan to start the antidepressant bupropion.   8/25-9/1:  Patient exhibiting worsening symptoms of catatonia, particularly mutism, immobility, and catalepsy, despite resuming previous dose of lorazepam. Will monitor catatonia with Ruvalcaba Marty scale. Bupropion has been ineffective, so it was discontinued and replaced with aripiprazole. May consider augmentation with ECT at next stage, given her refractory symptoms.  9/2-9/14  Mild improvement in depressive symptoms since starting aripiprazole, but overall minimal improvement. Will pursue ECT at this time.   Obtained neurology consult to r/o autoimmune encephalitis; workup in plan  Pt remains catatonic.  Father postponed the first ECT treatment to Wednesday.  9/15  Patient got ECT and is exhibiting some response. Will taper lorazepam due to oversedation.    1. Admission status  9.27 (converted from 9.39)    2. Significant lab findings  UDS positive for THC    3. Psychotropic medications  - Escitalopram 10 mg daily (depression)  	- Home med  - Aripiprazole 10 mg daily (depression augmentation)  	- Started 9/1, inc 9/4  - Lorazepam 1 mg TID (catatonia)   	- Started 8/12, decreased 8/17, decreased 8/23, increased 8/25, increased 8/30, inc 9/7, dec 9/8, dec 9/15  - Discontinued quetiapine - patient with adequate sleep  - Discontinued Bupropion XL - minimal effect      4. 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  anti-cardiolipin, p-ANCA, c-ANCA, anti-TPO, anti-thyroglobulin, NICK, anti-ß2 microglobulin, anti-dsDNA, C3 level, and C4 level.    B) ECT  - Consent for bifrontal ECT obtained, will begin series on Wednesday  - NPO and hold lorazepam on evenings and mornings before ECT    5. Psychosocial interventions  A) Remaining in contact with father Dr. Knox

## 2021-09-16 PROCEDURE — 99232 SBSQ HOSP IP/OBS MODERATE 35: CPT | Mod: 25

## 2021-09-16 PROCEDURE — 90853 GROUP PSYCHOTHERAPY: CPT

## 2021-09-16 RX ADMIN — Medication 1 MILLIGRAM(S): at 20:42

## 2021-09-16 RX ADMIN — ESCITALOPRAM OXALATE 10 MILLIGRAM(S): 10 TABLET, FILM COATED ORAL at 09:16

## 2021-09-16 RX ADMIN — ARIPIPRAZOLE 10 MILLIGRAM(S): 15 TABLET ORAL at 09:16

## 2021-09-16 RX ADMIN — Medication 1 MILLIGRAM(S): at 09:17

## 2021-09-16 NOTE — BH INPATIENT PSYCHIATRY PROGRESS NOTE - NSBHMSESPABN_PSY_A_CORE
Soft volume/Decreased productivity/Increased latency
Soft volume/Decreased productivity
Soft volume/Decreased productivity/Increased latency

## 2021-09-16 NOTE — EEG REPORT - NS EEG TEXT BOX
REPORT OF ROUTINE EEG WITH VIDEO  [facility]: [address], [address] [place] [address], Phone: [phone] [facility]: [address] [place] [address], [place] [address], Phone: [phone] Office: [address] [place] [address], [place] [address], [place] [address], Phone: [phone]  Patient Name: AMANDA TOMPKINS   Age: - : - MRN #: -, Location: - Referring Physician: TAMARA MONTANEZ  EEG #: [id number] Study Date: 2021   Start Time: 1:46:56 PM    Study Duration: 22.2 		  Technical Information:					 On Instrument: - Placement and Labeling of Electrodes: The EEG was performed utilizing 20 channels referential EEG connections (coronal over temporal over parasagittal montage) using all standard 10-20 electrode placements with EKG.  Recording was at a sampling rate of 256 samples per second per channel.  Time synchronized digital video recording was done simultaneously with EEG recording.  A low light infrared camera was used for low light recording.  Dwain and seizure detection algorithms were utilized. CSA Technical Component: Quantitative EEG analysis using a separate Compressed Spectral Array (CSA) software package was conducted in real-time and run at bedside after set up by the technician, digitally displaying the power of electrographic frequencies included in the 1-30Hz band using a graded color map.  This data was reviewed and interpreted independently, and is reported in a separate section below.  History:  ROUTINE EEG AT BEDSIDE 03 26 YEAR OLD FEMALE COR: AWAKE / DROWSY P/W: PSYCHIATRIC PMH:ASTHMA, DEPRESSION, ANXIETY HV: NOT COMPLETED DUE TO PANDEMIC PHOTIC: COMPLETED A&OX4 CONCERN FOR SEIZURES  Medication LORAZEPAM ARIPIPRAZOLE ESATALOPRAM  Study Interpretation:  FINDINGS:  The background was continuous, spontaneously variable and reactive.  During wakefulness, the posteriorly dominant rhythm consisted of symmetric, well modulated 9.5 Hz activity, with an amplitude to 30 uV, that attenuated to eye opening.    Background Slowing: Generalized slowing: none was present. Focal slowing: none was present.  Sleep Background: Drowsiness was characterized by fragmentation, attenuation, and slowing of the background activity.   Stage II sleep transients were not recorded.  Non-epileptiform activity: Diffuse excess beta activity  Epileptiform Activity:  No epileptiform discharges were present.  Events: No clinical events were recorded. No seizures were recorded.  Activation Procedures:  -Hyperventilation was not performed.   -Photic driving response was noted intermittently.  Artifacts: Intermittent myogenic and movement artifacts were noted.  ECG: The heart rate on single channel ECG was predominantly between  BPM.  EEG Classification / Summary:  Abnormal EEG in the awake, drowsy and asleep states. -Mild background slowing, generalized -Excess beta activity, diffuse  Clinical Impression:  Mild nonspecific diffuse or multifocal cerebral dysfunction.  Excess diffuse beta activity may be seen with medication use such as benzodiazepines or barbiturates. No epileptiform pattern or seizure seen.  Preliminary Fellow Report, final report pending attending review  Arthur Cervantes MD Epilepsy Fellow  Reading Room: [phone] On Call Service After Hours: [phone]    REPORT OF ROUTINE EEG WITH VIDEO  [facility]: [address], [address] [place] [address], Phone: [phone] [facility]: [address] [place] [address], [place] [address], Phone: [phone] Office: [address] [place] [address], [place] [address], [place] [address], Phone: [phone]  Patient Name: AMANDA TOMPKINS   Age: - : - MRN #: -, Location: - Referring Physician: TAMARA MONTANEZ  EEG #: 21- Study Date: 2021   Start Time: 1:46:56 PM    Study Duration: 22.2 		  Technical Information:					 On Instrument: - Placement and Labeling of Electrodes: The EEG was performed utilizing 20 channels referential EEG connections (coronal over temporal over parasagittal montage) using all standard 10-20 electrode placements with EKG.  Recording was at a sampling rate of 256 samples per second per channel.  Time synchronized digital video recording was done simultaneously with EEG recording.  A low light infrared camera was used for low light recording.  Dwain and seizure detection algorithms were utilized. CSA Technical Component: Quantitative EEG analysis using a separate Compressed Spectral Array (CSA) software package was conducted in real-time and run at bedside after set up by the technician, digitally displaying the power of electrographic frequencies included in the 1-30Hz band using a graded color map.  This data was reviewed and interpreted independently, and is reported in a separate section below.  History:  ROUTINE EEG AT BEDSIDE 03 26 YEAR OLD FEMALE COR: AWAKE / DROWSY P/W: PSYCHIATRIC PMH:ASTHMA, DEPRESSION, ANXIETY HV: NOT COMPLETED DUE TO PANDEMIC PHOTIC: COMPLETED A&OX4 CONCERN FOR SEIZURES  Medication LORAZEPAM ARIPIPRAZOLE ESATALOPRAM  Study Interpretation:  FINDINGS:  The background was continuous, spontaneously variable and reactive.  During wakefulness, the posteriorly dominant rhythm consisted of symmetric, well modulated 9.5 Hz activity, with an amplitude to 30 uV, that attenuated to eye opening.    Background Slowing: Generalized slowing: none was present. Focal slowing: none was present.  Sleep Background: Drowsiness was characterized by fragmentation, attenuation, and slowing of the background activity.   Stage II sleep transients were not recorded.  Non-epileptiform activity: Diffuse excess beta activity  Epileptiform Activity:  No epileptiform discharges were present.  Events: No clinical events were recorded. No seizures were recorded.  Activation Procedures:  -Hyperventilation was not performed.   -Photic driving response was noted intermittently.  Artifacts: Intermittent myogenic and movement artifacts were noted.  ECG: The heart rate on single channel ECG was predominantly between  BPM.  EEG Classification / Summary:  Abnormal EEG in the awake, drowsy and asleep states. -Mild background slowing, generalized -Excess beta activity, diffuse  Clinical Impression:  Mild nonspecific diffuse or multifocal cerebral dysfunction.  Excess diffuse beta activity may be seen with medication use such as benzodiazepines or barbiturates. No epileptiform pattern or seizure seen.  Arthur Cervantes MD Epilepsy Fellow  Slava Maldonado MD PhD Director, Epilepsy Division, [facility] EEG Reading Room Ph#: [phone] Epilepsy Answering Service after 5PM and before 8:30AM: Ph#: [phone]

## 2021-09-16 NOTE — BH INPATIENT PSYCHIATRY PROGRESS NOTE - NSBHFUPINTERVALHXFT_PSY_A_CORE
Chart reviewed, including vital signs, medication administration record, lab results, and nursing notes.   Case discussed with nursing, psychology, psych rehab, and social work in team meeting.   - Patient significantly improved, showering and attending groups    Patient is seen in the day room, in no acute distress, amenable to interview. The patient is significantly improved from yesterday. She is seen attending group, walking in the halls, and is able to respond to me in full sentences. She states that she is “feeling more outgoing today.“ States that she “felt really cold“ during the ECT procedure, but denies any side effects including difficulties with memory, headaches, or muscle pain. States that her parents visited her yesterday. Reports stable sleep and appetite. Denies medication side effects. Denies SI/HI/AVH. We discuss the plan to taper the dose of lorazepam. She asks when she can be discharged from the hospital.

## 2021-09-16 NOTE — BH INPATIENT PSYCHIATRY PROGRESS NOTE - NSBHASSESSSUMMFT_PSY_ALL_CORE
Patient is a 26 year-old female with history of depression and polysubstance use (marijuana, Adderall), brought in by family for worsening anxiety and poor self-care. Patient is tearful and nonverbal. She is noted to be grossly underweight, and has some catatonic features (mutism, staring, poor PO intake) but this may be more related to psychomotor slowing.   PsyHx: Seen by outpatient provider Dr. Merrill.   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 However it was noted that she became overly sedated, and the dose of lorazepam was reduced. Patient minimally verbal but able to respond to questions nonverbally. Patient started on the antidepressant bupropion to target psychomotor slowing. Discussed plan with father and outpatient provider Dr. Merrill who agree with plan to start the antidepressant bupropion.   8/25-9/1:  Patient exhibiting worsening symptoms of catatonia, particularly mutism, immobility, and catalepsy, despite resuming previous dose of lorazepam. Will monitor catatonia with Ruvalcaba Marty scale. Bupropion has been ineffective, so it was discontinued and replaced with aripiprazole. May consider augmentation with ECT at next stage, given her refractory symptoms.  9/2-9/14  Mild improvement in depressive symptoms since starting aripiprazole, but overall minimal improvement. Will pursue ECT at this time.   Obtained neurology consult to r/o autoimmune encephalitis; workup in plan  Pt remains catatonic.  Father postponed the first ECT treatment to Wednesday.  9/15  Patient had first ECT treatment and is improving, speaking in full sentences. Will continue ECT and taper lorazepam.    1. Admission status  9.27 (converted from 9.39)    2. Significant lab findings  UDS positive for THC    3. Psychotropic medications  - Escitalopram 10 mg daily (depression)  	- Home med  - Aripiprazole 10 mg daily (depression augmentation)  	- Started 9/1, inc 9/4  - Lorazepam 1 mg TID (catatonia)   	- Started 8/12, decreased 8/17, decreased 8/23, increased 8/25, increased 8/30, inc 9/7, dec 9/8, dec 9/15  - Discontinued quetiapine - patient with adequate sleep  - Discontinued Bupropion XL - minimal effect      4. 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  anti-cardiolipin, p-ANCA, c-ANCA, anti-TPO, anti-thyroglobulin, NICK, anti-ß2 microglobulin, anti-dsDNA, C3 level, and C4 level.    B) ECT  - Consent for bifrontal ECT obtained, will begin series on Wednesday  - NPO and hold lorazepam on evenings and mornings before ECT    5. Psychosocial interventions  A) Remaining in contact with father Dr. Knox

## 2021-09-17 PROCEDURE — 90870 ELECTROCONVULSIVE THERAPY: CPT

## 2021-09-17 PROCEDURE — 99232 SBSQ HOSP IP/OBS MODERATE 35: CPT | Mod: 25

## 2021-09-17 RX ORDER — MIDAZOLAM HYDROCHLORIDE 1 MG/ML
2 INJECTION, SOLUTION INTRAMUSCULAR; INTRAVENOUS ONCE
Refills: 0 | Status: DISCONTINUED | OUTPATIENT
Start: 2021-09-17 | End: 2021-09-17

## 2021-09-17 RX ORDER — FLUMAZENIL 0.1 MG/ML
0.4 VIAL (ML) INTRAVENOUS ONCE
Refills: 0 | Status: COMPLETED | OUTPATIENT
Start: 2021-09-17 | End: 2021-09-17

## 2021-09-17 RX ADMIN — Medication 1 MILLIGRAM(S): at 13:54

## 2021-09-17 RX ADMIN — Medication 0.4 MILLIGRAM(S): at 09:02

## 2021-09-17 RX ADMIN — MIDAZOLAM HYDROCHLORIDE 2 MILLIGRAM(S): 1 INJECTION, SOLUTION INTRAMUSCULAR; INTRAVENOUS at 09:06

## 2021-09-17 RX ADMIN — Medication 0.5 MILLIGRAM(S): at 21:03
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
## 2021-09-17 NOTE — BH TREATMENT PLAN - NSTXSUICIDINTERMD_PSY_ALL_CORE
Titrate antidepressants
Titrate bupropion to treat depression with psychmotor slowing
Titrate aripiprazole
Titrate aripiprazole
Titrate antidepressants

## 2021-09-17 NOTE — BH INPATIENT PSYCHIATRY PROGRESS NOTE - NSBHFUPINTERVALHXFT_PSY_A_CORE
Chart reviewed, including vital signs, medication administration record, lab results, and nursing notes.   Case discussed with nursing, psychology, psych rehab, and social work in team meeting.     Patient seen in her bed, in no acute distress, sleeping but awakens and is amenable to interview. Significant improvement in speech latency and amount. States she is feeling better, and asks how many more ECT treatments she will receive. States that she feels "tired" after ECT. Denies any other side effects. Reports stable sleep and appetite. Denies SI/HI/AVH. Discussed plan to continue taper of lorazepam.    Patient is seen in the day room, in no acute distress, amenable to interview. The patient is significantly improved from yesterday. She is seen attending group, walking in the halls, and is able to respond to me in full sentences. She states that she is “feeling more outgoing today.“ States that she “felt really cold“ during the ECT procedure, but denies any side effects including difficulties with memory, headaches, or muscle pain. States that her parents visited her yesterday. Reports stable sleep and appetite. Denies medication side effects. Denies SI/HI/AVH. We discuss the plan to taper the dose of lorazepam. She asks when she can be discharged from the hospital.

## 2021-09-17 NOTE — BH INPATIENT PSYCHIATRY PROGRESS NOTE - NSBHASSESSSUMMFT_PSY_ALL_CORE
Patient is a 26 year-old female with history of depression and polysubstance use (marijuana, Adderall), brought in by family for worsening anxiety and poor self-care. Patient is tearful and nonverbal. She is noted to be grossly underweight, and has some catatonic features (mutism, staring, poor PO intake) but this may be more related to psychomotor slowing.   PsyHx: Seen by outpatient provider Dr. Merrill.   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 However it was noted that she became overly sedated, and the dose of lorazepam was reduced. Patient minimally verbal but able to respond to questions nonverbally. Patient started on the antidepressant bupropion to target psychomotor slowing. Discussed plan with father and outpatient provider Dr. Merrill who agree with plan to start the antidepressant bupropion.   8/25-9/1:  Patient exhibiting worsening symptoms of catatonia, particularly mutism, immobility, and catalepsy, despite resuming previous dose of lorazepam. Will monitor catatonia with Ruvalcaba Marty scale. Bupropion has been ineffective, so it was discontinued and replaced with aripiprazole. May consider augmentation with ECT at next stage, given her refractory symptoms.  9/2-9/14  Mild improvement in depressive symptoms since starting aripiprazole, but overall minimal improvement. Will pursue ECT at this time.   Obtained neurology consult to r/o autoimmune encephalitis; workup in plan  Pt remains catatonic.  Father postponed the first ECT treatment to Wednesday.  9/15  Patient responding well to ECT treatment and is improving, speaking in full sentences. Will continue ECT and taper lorazepam.    1. Admission status  9.27 (converted from 9.39)    2. Significant lab findings  UDS positive for THC    3. Psychotropic medications  - Escitalopram 10 mg daily (depression)  	- Home med  - Aripiprazole 10 mg daily (depression augmentation)  	- Started 9/1, inc 9/4  - Lorazepam 0.5 mg TID (catatonia)   	- Started 8/12, decreased 8/17, decreased 8/23, increased 8/25, increased 8/30, inc 9/7, dec 9/8, dec 9/15, dec 9/17  - Discontinued quetiapine - patient with adequate sleep  - Discontinued Bupropion XL - minimal effect      4. 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  anti-cardiolipin, p-ANCA, c-ANCA, anti-TPO, anti-thyroglobulin, NICK, anti-ß2 microglobulin, anti-dsDNA, C3 level, and C4 level.    B) ECT  - Consent for bifrontal ECT obtained, will begin series on Wednesday  - NPO and hold lorazepam on evenings and mornings before ECT    5. Psychosocial interventions  A) Remaining in contact with father Dr. Knox

## 2021-09-18 RX ADMIN — ARIPIPRAZOLE 10 MILLIGRAM(S): 15 TABLET ORAL at 08:38

## 2021-09-18 RX ADMIN — Medication 0.5 MILLIGRAM(S): at 21:03

## 2021-09-18 RX ADMIN — Medication 0.5 MILLIGRAM(S): at 15:25

## 2021-09-18 RX ADMIN — ESCITALOPRAM OXALATE 10 MILLIGRAM(S): 10 TABLET, FILM COATED ORAL at 08:38

## 2021-09-18 RX ADMIN — Medication 0.5 MILLIGRAM(S): at 06:40

## 2021-09-19 LAB
HCG SERPL-ACNC: <5 MIU/ML — SIGNIFICANT CHANGE UP
SARS-COV-2 RNA SPEC QL NAA+PROBE: SIGNIFICANT CHANGE UP

## 2021-09-19 RX ADMIN — ESCITALOPRAM OXALATE 10 MILLIGRAM(S): 10 TABLET, FILM COATED ORAL at 09:35

## 2021-09-19 RX ADMIN — Medication 0.5 MILLIGRAM(S): at 06:38

## 2021-09-19 RX ADMIN — Medication 0.5 MILLIGRAM(S): at 21:47

## 2021-09-19 RX ADMIN — ARIPIPRAZOLE 10 MILLIGRAM(S): 15 TABLET ORAL at 09:36

## 2021-09-19 RX ADMIN — Medication 0.5 MILLIGRAM(S): at 16:20

## 2021-09-20 PROCEDURE — 99232 SBSQ HOSP IP/OBS MODERATE 35: CPT | Mod: 25

## 2021-09-20 PROCEDURE — 90870 ELECTROCONVULSIVE THERAPY: CPT

## 2021-09-20 RX ORDER — FLUMAZENIL 0.1 MG/ML
0.4 VIAL (ML) INTRAVENOUS ONCE
Refills: 0 | Status: COMPLETED | OUTPATIENT
Start: 2021-09-20 | End: 2021-09-20

## 2021-09-20 RX ORDER — MIDAZOLAM HYDROCHLORIDE 1 MG/ML
2 INJECTION, SOLUTION INTRAMUSCULAR; INTRAVENOUS ONCE
Refills: 0 | Status: DISCONTINUED | OUTPATIENT
Start: 2021-09-20 | End: 2021-09-20

## 2021-09-20 RX ADMIN — ESCITALOPRAM OXALATE 10 MILLIGRAM(S): 10 TABLET, FILM COATED ORAL at 10:27

## 2021-09-20 RX ADMIN — Medication 0.4 MILLIGRAM(S): at 08:36

## 2021-09-20 RX ADMIN — MIDAZOLAM HYDROCHLORIDE 2 MILLIGRAM(S): 1 INJECTION, SOLUTION INTRAMUSCULAR; INTRAVENOUS at 08:39

## 2021-09-20 RX ADMIN — Medication 0.5 MILLIGRAM(S): at 06:30

## 2021-09-20 RX ADMIN — ARIPIPRAZOLE 10 MILLIGRAM(S): 15 TABLET ORAL at 10:26

## 2021-09-20 NOTE — BH INPATIENT PSYCHIATRY PROGRESS NOTE - NSBHASSESSSUMMFT_PSY_ALL_CORE
Patient is a 26 year-old female with history of depression and polysubstance use (marijuana, Adderall), brought in by family for worsening anxiety and poor self-care. Patient is tearful and nonverbal. She is noted to be grossly underweight, and has some catatonic features (mutism, staring, poor PO intake) but this may be more related to psychomotor slowing.   PsyHx: Seen by outpatient provider Dr. Merrill.   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 However it was noted that she became overly sedated, and the dose of lorazepam was reduced. Patient minimally verbal but able to respond to questions nonverbally. Patient started on the antidepressant bupropion to target psychomotor slowing. Discussed plan with father and outpatient provider Dr. Merrill who agree with plan to start the antidepressant bupropion.   8/25-9/1:  Patient exhibiting worsening symptoms of catatonia, particularly mutism, immobility, and catalepsy, despite resuming previous dose of lorazepam. Will monitor catatonia with Ruvalcaba Marty scale. Bupropion has been ineffective, so it was discontinued and replaced with aripiprazole. May consider augmentation with ECT at next stage, given her refractory symptoms.  9/2-9/14  Mild improvement in depressive symptoms since starting aripiprazole, but overall minimal improvement. Will pursue ECT at this time.   Obtained neurology consult to r/o autoimmune encephalitis; workup in plan  Pt remains catatonic.  Father postponed the first ECT treatment to Wednesday.  9/15  Patient responding well to ECT treatment and is improving, speaking in full sentences. Will continue ECT and taper lorazepam.    1. Admission status  9.27 (converted from 9.39)    2. Significant lab findings  UDS positive for THC    3. Psychotropic medications  - Escitalopram 10 mg daily (depression)  	- Home med  - Aripiprazole 10 mg daily (depression augmentation)  	- Started 9/1, inc 9/4    Discontinued lorazepam 0.5 mg TID (catatonia)   	- Started 8/12, decreased 8/17, decreased 8/23, increased 8/25, increased 8/30, inc 9/7, dec 9/8, dec 9/15, dec 9/17, d/c 9/20  Discontinued quetiapine - patient with adequate sleep  Discontinued Bupropion XL - minimal effect      4. 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  anti-cardiolipin, p-ANCA, c-ANCA, anti-TPO, anti-thyroglobulin, NICK, anti-ß2 microglobulin, anti-dsDNA, C3 level, and C4 level.    B) ECT  - Consent for bifrontal ECT obtained, will begin series on Wednesday  - NPO and hold lorazepam on evenings and mornings before ECT    5. Psychosocial interventions  A) Remaining in contact with father Dr. Knox

## 2021-09-20 NOTE — BH INPATIENT PSYCHIATRY PROGRESS NOTE - NSBHFUPINTERVALHXFT_PSY_A_CORE
Chart reviewed, including vital signs, medication administration record, lab results, and nursing notes.   Case discussed with nursing, psychology, psych rehab, and social work in team meeting.     Patient is seen in the day room, in no acute distress, amenable to interview. She is speaking in full sentences and making good eye contact. States that she is doing well. Denies any medication side effects. Denies any side effects with ECT. Reports stable sleep and appetite. Denies SI/HI/AVH. We discussed the plan to discontinue the dose of Ativan today.

## 2021-09-21 PROCEDURE — 90853 GROUP PSYCHOTHERAPY: CPT

## 2021-09-21 PROCEDURE — 99231 SBSQ HOSP IP/OBS SF/LOW 25: CPT | Mod: 25

## 2021-09-21 RX ADMIN — ARIPIPRAZOLE 10 MILLIGRAM(S): 15 TABLET ORAL at 08:34

## 2021-09-21 RX ADMIN — ESCITALOPRAM OXALATE 10 MILLIGRAM(S): 10 TABLET, FILM COATED ORAL at 08:34

## 2021-09-21 NOTE — BH INPATIENT PSYCHIATRY PROGRESS NOTE - NSBHFUPINTERVALHXFT_PSY_A_CORE
Chart reviewed, including vital signs, medication administration record, lab results, and nursing notes.   Case discussed with nursing, psychology, psych rehab, and social work in team meeting.     Patient is seen in the treatment room, with a bright affect, in no acute distress, amenable to interview. States that she had a fight with her mother last night about when she can be discharged. She states that her mood is much improved, and 8-9 out of 10. She denies any side effects with ECT, including memory issues, headaches, or muscle pain. Reports stable sleep and appetite. Denies SI/HI/AVH.

## 2021-09-21 NOTE — BH INPATIENT PSYCHIATRY PROGRESS NOTE - NSBHASSESSSUMMFT_PSY_ALL_CORE
Patient is a 26 year-old female with history of depression and polysubstance use (marijuana, Adderall), brought in by family for worsening anxiety and poor self-care. Patient is tearful and nonverbal. She is noted to be grossly underweight, and has some catatonic features (mutism, staring, poor PO intake) but this may be more related to psychomotor slowing.   PsyHx: Seen by outpatient provider Dr. Merrill.   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 However it was noted that she became overly sedated, and the dose of lorazepam was reduced. Patient minimally verbal but able to respond to questions nonverbally. Patient started on the antidepressant bupropion to target psychomotor slowing. Discussed plan with father and outpatient provider Dr. Merrill who agree with plan to start the antidepressant bupropion.   8/25-9/1:  Patient exhibiting worsening symptoms of catatonia, particularly mutism, immobility, and catalepsy, despite resuming previous dose of lorazepam. Will monitor catatonia with Ruvalcaba Marty scale. Bupropion has been ineffective, so it was discontinued and replaced with aripiprazole. May consider augmentation with ECT at next stage, given her refractory symptoms.  9/2-9/14  Mild improvement in depressive symptoms since starting aripiprazole, but overall minimal improvement. Will pursue ECT at this time.   Obtained neurology consult to r/o autoimmune encephalitis; workup in plan  Pt remains catatonic.  Father postponed the first ECT treatment to Wednesday.  9/15  Patient responding well to ECT treatment and is improving, speaking in full sentences. Discontinued lorazepam. Will continue ECT with plan for discharge next week to continue ECT as outpatient.    1. Admission status  9.27 (converted from 9.39)    2. Significant lab findings  UDS positive for THC    3. Psychotropic medications  - Escitalopram 10 mg daily (depression)  	- Home med  - Aripiprazole 10 mg daily (depression augmentation)  	- Started 9/1, inc 9/4    Discontinued lorazepam 0.5 mg TID (catatonia)   	- Started 8/12, decreased 8/17, decreased 8/23, increased 8/25, increased 8/30, inc 9/7, dec 9/8, dec 9/15, dec 9/17, d/c 9/20  Discontinued quetiapine - patient with adequate sleep  Discontinued Bupropion XL - minimal effect      4. 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  anti-cardiolipin, p-ANCA, c-ANCA, anti-TPO, anti-thyroglobulin, NICK, anti-ß2 microglobulin, anti-dsDNA, C3 level, and C4 level.    B) ECT  - Consent for bifrontal ECT obtained  - Treatment #4 on 9/22  - NPO and hold lorazepam on evenings and mornings before ECT    5. Psychosocial interventions  A) Remaining in contact with father Dr. Knox

## 2021-09-22 PROCEDURE — 90870 ELECTROCONVULSIVE THERAPY: CPT

## 2021-09-22 PROCEDURE — 99232 SBSQ HOSP IP/OBS MODERATE 35: CPT | Mod: 25

## 2021-09-22 RX ORDER — ARIPIPRAZOLE 15 MG/1
5 TABLET ORAL DAILY
Refills: 0 | Status: DISCONTINUED | OUTPATIENT
Start: 2021-09-22 | End: 2021-09-28

## 2021-09-22 RX ORDER — HYDROXYZINE HCL 10 MG
25 TABLET ORAL THREE TIMES A DAY
Refills: 0 | Status: DISCONTINUED | OUTPATIENT
Start: 2021-09-22 | End: 2021-09-28

## 2021-09-22 RX ORDER — ACETAMINOPHEN 500 MG
650 TABLET ORAL ONCE
Refills: 0 | Status: COMPLETED | OUTPATIENT
Start: 2021-09-22 | End: 2021-09-22

## 2021-09-22 RX ORDER — FLUMAZENIL 0.1 MG/ML
0.4 VIAL (ML) INTRAVENOUS ONCE
Refills: 0 | Status: COMPLETED | OUTPATIENT
Start: 2021-09-22 | End: 2021-09-22

## 2021-09-22 RX ORDER — MIDAZOLAM HYDROCHLORIDE 1 MG/ML
2 INJECTION, SOLUTION INTRAMUSCULAR; INTRAVENOUS ONCE
Refills: 0 | Status: DISCONTINUED | OUTPATIENT
Start: 2021-09-22 | End: 2021-09-22

## 2021-09-22 RX ADMIN — ESCITALOPRAM OXALATE 10 MILLIGRAM(S): 10 TABLET, FILM COATED ORAL at 08:32

## 2021-09-22 RX ADMIN — Medication 650 MILLIGRAM(S): at 19:00

## 2021-09-22 RX ADMIN — ARIPIPRAZOLE 10 MILLIGRAM(S): 15 TABLET ORAL at 08:32

## 2021-09-22 RX ADMIN — Medication 650 MILLIGRAM(S): at 18:59

## 2021-09-22 RX ADMIN — MIDAZOLAM HYDROCHLORIDE 2 MILLIGRAM(S): 1 INJECTION, SOLUTION INTRAMUSCULAR; INTRAVENOUS at 14:11

## 2021-09-22 RX ADMIN — Medication 0.4 MILLIGRAM(S): at 14:06

## 2021-09-22 NOTE — BH INPATIENT PSYCHIATRY PROGRESS NOTE - NSBHFUPINTERVALHXFT_PSY_A_CORE
Chart reviewed, including vital signs, medication administration record, lab results, and nursing notes.   Case discussed with nursing, psychology, psych rehab, and social work in team meeting.     Patient is seen in the treatment room, in no acute distress, amenable to interview. States that yesterday she had a good breakfast, and a good visit from her family. States that she prior to hospitalization, she had been smoking marijuana daily. She did not feel that this was a problem, as she would only smoke to reduce her anxiety. She reports continuing to experience anxiety right now, so we discussed a plan to decrease her dose of Abilify and start Atarax as needed for anxiety. She is unable to recall the events leading up to her hospitalization, but she does recall staring at everybody and feeling better believing that other people could read her mind. She acknowledges taking Adderall occasionally to study and help her feel more energetic. She acknowledges that she began to feel paranoid after taking this. Psychoeducation was provided on the diagnosis catatonia and illicit substances. Discuss the plan for a referral to a dual diagnosis program.

## 2021-09-22 NOTE — BH INPATIENT PSYCHIATRY PROGRESS NOTE - NSBHASSESSSUMMFT_PSY_ALL_CORE
Patient is a 26 year-old female with history of depression and polysubstance use (marijuana, Adderall), brought in by family for worsening anxiety and poor self-care. Patient is tearful and nonverbal. She is noted to be grossly underweight, and has some catatonic features (mutism, staring, poor PO intake) but this may be more related to psychomotor slowing.   PsyHx: Seen by outpatient provider Dr. Merrill.   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 However it was noted that she became overly sedated, and the dose of lorazepam was reduced. Patient minimally verbal but able to respond to questions nonverbally. Patient started on the antidepressant bupropion to target psychomotor slowing. Discussed plan with father and outpatient provider Dr. Merrill who agree with plan to start the antidepressant bupropion.   8/25-9/1:  Patient exhibiting worsening symptoms of catatonia, particularly mutism, immobility, and catalepsy, despite resuming previous dose of lorazepam. Will monitor catatonia with Ruvalcaba Marty scale. Bupropion has been ineffective, so it was discontinued and replaced with aripiprazole. May consider augmentation with ECT at next stage, given her refractory symptoms.  9/2-9/14  Mild improvement in depressive symptoms since starting aripiprazole, but overall minimal improvement. Will pursue ECT at this time.   Obtained neurology consult to r/o autoimmune encephalitis; workup in plan  Pt remains catatonic.  Father postponed the first ECT treatment to Wednesday.  9/15  Patient responding well to ECT treatment and is improving, speaking in full sentences.   Discontinued lorazepam. Will continue ECT with plan for discharge next week to continue ECT as outpatient.  Decreased dose of Abilify due to increased feelings of anxiety    1. Admission status  9.27 (converted from 9.39)    2. Significant lab findings  UDS positive for THC    3. Psychotropic medications  - Escitalopram 10 mg daily (depression)  	- Home med  - Aripiprazole 5 mg daily (depression augmentation)  	- Started 9/1, inc 9/4, dec 9/22    Discontinued lorazepam 0.5 mg TID (catatonia)   	- Started 8/12, decreased 8/17, decreased 8/23, increased 8/25, increased 8/30, inc 9/7, dec 9/8, dec 9/15, dec 9/17, d/c 9/20  Discontinued quetiapine - patient with adequate sleep  Discontinued Bupropion XL - minimal effect      4. 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  anti-cardiolipin, p-ANCA, c-ANCA, anti-TPO, anti-thyroglobulin, NICK, anti-ß2 microglobulin, anti-dsDNA, C3 level, and C4 level.    B) ECT  - Consent for bifrontal ECT obtained  - Treatment #4 on 9/22  - NPO and hold lorazepam on evenings and mornings before ECT    5. Psychosocial interventions  A) Remaining in contact with father Dr. Knox

## 2021-09-23 PROCEDURE — 90853 GROUP PSYCHOTHERAPY: CPT

## 2021-09-23 PROCEDURE — 99231 SBSQ HOSP IP/OBS SF/LOW 25: CPT | Mod: 25

## 2021-09-23 RX ADMIN — ESCITALOPRAM OXALATE 10 MILLIGRAM(S): 10 TABLET, FILM COATED ORAL at 08:40

## 2021-09-23 RX ADMIN — ARIPIPRAZOLE 5 MILLIGRAM(S): 15 TABLET ORAL at 08:40

## 2021-09-23 NOTE — BH INPATIENT PSYCHIATRY PROGRESS NOTE - NSBHFUPINTERVALHXFT_PSY_A_CORE
Chart reviewed, including vital signs, medication administration record, lab results, and nursing notes.   Case discussed with nursing, psychology, psych rehab, and social work in team meeting.     Patient is seen in the treatment room, in no acute distress, amenable to interview. States that her anxiety is improved today. States that she previously experienced anxiety around large crowds, but denies experiencing this in other situations. 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 She denies medication side effects. Reports stable sleep and appetite. Denies SI/HI/AVH. We discuss the plan to continue ECT treatments through next Monday, and have a family meeting on Monday with her father, with a plan for discharge on Tuesday.

## 2021-09-23 NOTE — BH INPATIENT PSYCHIATRY PROGRESS NOTE - NSBHASSESSSUMMFT_PSY_ALL_CORE
Patient is a 26 year-old female with history of depression and polysubstance use (marijuana, Adderall), brought in by family for worsening anxiety and poor self-care. Patient is tearful and nonverbal. She is noted to be grossly underweight, and has some catatonic features (mutism, staring, poor PO intake) but this may be more related to psychomotor slowing.   PsyHx: Seen by outpatient provider Dr. Merrill.   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 However it was noted that she became overly sedated, and the dose of lorazepam was reduced. Patient minimally verbal but able to respond to questions nonverbally. Patient started on the antidepressant bupropion to target psychomotor slowing. Discussed plan with father and outpatient provider Dr. Merrill who agree with plan to start the antidepressant bupropion.   8/25-9/1:  Patient exhibiting worsening symptoms of catatonia, particularly mutism, immobility, and catalepsy, despite resuming previous dose of lorazepam. Will monitor catatonia with Ruvalcaba Marty scale. Bupropion has been ineffective, so it was discontinued and replaced with aripiprazole. May consider augmentation with ECT at next stage, given her refractory symptoms.  9/2-9/14  Mild improvement in depressive symptoms since starting aripiprazole, but overall minimal improvement. Will pursue ECT at this time.   Obtained neurology consult to r/o autoimmune encephalitis; workup in plan  Pt remains catatonic.  Father postponed the first ECT treatment to Wednesday.  9/15  Patient responding well to ECT treatment and is improving, speaking in full sentences.   Discontinued lorazepam. Will continue ECT with plan for discharge next week to continue ECT as outpatient.  Decreased dose of Abilify due to increased feelings of anxiety    1. Admission status  9.27 (converted from 9.39)    2. Significant lab findings  UDS positive for THC    3. Psychotropic medications  - Escitalopram 10 mg daily (depression)  	- Home med  - Aripiprazole 5 mg daily (depression augmentation)  	- Started 9/1, inc 9/4, dec 9/22    Discontinued lorazepam 0.5 mg TID (catatonia)   	- Started 8/12, decreased 8/17, decreased 8/23, increased 8/25, increased 8/30, inc 9/7, dec 9/8, dec 9/15, dec 9/17, d/c 9/20  Discontinued quetiapine - patient with adequate sleep  Discontinued Bupropion XL - minimal effect      4. 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  anti-cardiolipin, p-ANCA, c-ANCA, anti-TPO, anti-thyroglobulin, NICK, anti-ß2 microglobulin, anti-dsDNA, C3 level, and C4 level.    B) ECT  - Consent for bifrontal ECT obtained  - Treatment #5 on 9/24  - NPO and hold lorazepam on evenings and mornings before ECT    5. Psychosocial interventions  A) Remaining in contact with father Dr. Knox

## 2021-09-24 PROCEDURE — 90870 ELECTROCONVULSIVE THERAPY: CPT

## 2021-09-24 PROCEDURE — 99231 SBSQ HOSP IP/OBS SF/LOW 25: CPT | Mod: 25

## 2021-09-24 PROCEDURE — 90853 GROUP PSYCHOTHERAPY: CPT

## 2021-09-24 RX ORDER — MIDAZOLAM HYDROCHLORIDE 1 MG/ML
2 INJECTION, SOLUTION INTRAMUSCULAR; INTRAVENOUS ONCE
Refills: 0 | Status: DISCONTINUED | OUTPATIENT
Start: 2021-09-24 | End: 2021-09-24

## 2021-09-24 RX ORDER — FLUMAZENIL 0.1 MG/ML
0.4 VIAL (ML) INTRAVENOUS ONCE
Refills: 0 | Status: DISCONTINUED | OUTPATIENT
Start: 2021-09-24 | End: 2021-09-24

## 2021-09-24 RX ADMIN — ESCITALOPRAM OXALATE 10 MILLIGRAM(S): 10 TABLET, FILM COATED ORAL at 09:53

## 2021-09-24 RX ADMIN — ARIPIPRAZOLE 5 MILLIGRAM(S): 15 TABLET ORAL at 09:53

## 2021-09-24 NOTE — ECT PRE-PROCEDURE CHECKLIST - NSPROEDALEARNPREFOTH_GEN_A_NUR
group instruction/individual instruction/pictorial/skill demonstration
group instruction/individual instruction/pictorial/skill demonstration

## 2021-09-24 NOTE — BH TREATMENT PLAN - NSTXANXINTERMD_PSY_ALL_CORE
Titrate lorazepam to treat catatonia  Titrate aripiprazole
Titrate lorazepam to treat catatonia  Titrate bupropion to treat depression with psychmotor slowing
Continue ECT
Continue ECT, taper lorazepam
Titrate lorazepam to treat catatonia  Titrate aripiprazole
Titrate lorazepam to treat catatonia

## 2021-09-24 NOTE — ECT PRE-PROCEDURE CHECKLIST - NSBENZOLAST24HRS_PSY_ALL_CORE
yes (notify psychiatrist)

## 2021-09-24 NOTE — BH INPATIENT PSYCHIATRY PROGRESS NOTE - NSBHFUPINTERVALHXFT_PSY_A_CORE
Chart reviewed, including vital signs, medication administration record, lab results, and nursing notes.   Case discussed with nursing, psychology, psych rehab, and social work in team meeting.     Patient is seen in the treatment room, in no acute distress, amenable to interview. The patient exhibits a bright affect and speaks in full sentences. States that she plans to continue pursuing her education after leaving the hospital. Because she is withdrawing for the semester, she plans to spend time studying at home and planning activities for herself. She reports regular use of cannabis, and enjoys eating and studying while high. She denies any issues with cannabis use, but she agrees to commit to abstinence. She again feels that Adderall abuse is what caused her current hospitalization. Denies any side effects from ECT. Reports stable sleep and appetite. Denies SI/HI/AVH.

## 2021-09-24 NOTE — BH TREATMENT PLAN - NSTXDEPRESGOAL_PSY_ALL_CORE
Attend and participate in at least 2 groups daily despite low mood/energy
Will identify 2 coping skills that assist in improving mood
Will identify 2 coping skills that assist in improving mood
Attend and participate in at least 2 groups daily despite low mood/energy
Attend and participate in at least 2 groups daily despite low mood/energy
Will identify 2 coping skills that assist in improving mood
Will identify 2 coping skills that assist in improving mood

## 2021-09-24 NOTE — BH TREATMENT PLAN - NSTXDCOPLKINTERSW_PSY_ALL_CORE
SW will provide pt with support, discuss aftercare plans, make appropriate referrals for mental health and discuss discharge readiness with team.
 to [facility] to provide support and psychoed and set up the discharge plan when appropriate.
 to [facility] to provide support and psychoed and set up the discharge plan when appropriate.

## 2021-09-24 NOTE — BH TREATMENT PLAN - NSDCCRITERIA_PSY_ALL_CORE
Improvement in depression and self-care

## 2021-09-24 NOTE — BH TREATMENT PLAN - NSTXDEPRESINTERRN_PSY_ALL_CORE
Encouraged to verbalizize feelings and concerns.
Encourage use of coping skills. Provide emotional support as needed.
Monitor mood and behavior. Encourage to verbalize feelings and concerns.
Mont[place] for depressive symptoms. Encourage to verbalize feelings and concerns.

## 2021-09-24 NOTE — BH TREATMENT PLAN - NSTXCAREGIVERPARTICIPATE_PSY_P_CORE
Family/Caregiver participated in identification of needs/problems/goals for treatment

## 2021-09-24 NOTE — BH TREATMENT PLAN - NSTXPLANSTARTDATE_PSY_ALL_CORE
12-Aug-2021 13:50
24-Sep-2021 17:01
03-Sep-2021 16:18
17-Sep-2021 14:50
10-Sep-2021 16:49
27-Aug-2021 14:36
20-Aug-2021 17:43

## 2021-09-24 NOTE — BH INPATIENT PSYCHIATRY PROGRESS NOTE - NSBHASSESSSUMMFT_PSY_ALL_CORE
Patient is a 26 year-old female with history of depression and polysubstance use (marijuana, Adderall), brought in by family for worsening anxiety and poor self-care. Patient is tearful and nonverbal. She is noted to be grossly underweight, and has some catatonic features (mutism, staring, poor PO intake) but this may be more related to psychomotor slowing.   PsyHx: Seen by outpatient provider Dr. Merrill.   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 However it was noted that she became overly sedated, and the dose of lorazepam was reduced. Patient minimally verbal but able to respond to questions nonverbally. Patient started on the antidepressant bupropion to target psychomotor slowing. Discussed plan with father and outpatient provider Dr. Merrill who agree with plan to start the antidepressant bupropion.   8/25-9/1:  Patient exhibiting worsening symptoms of catatonia, particularly mutism, immobility, and catalepsy, despite resuming previous dose of lorazepam. Will monitor catatonia with Ruvalcaba Marty scale. Bupropion has been ineffective, so it was discontinued and replaced with aripiprazole. May consider augmentation with ECT at next stage, given her refractory symptoms.  9/2-9/14  Mild improvement in depressive symptoms since starting aripiprazole, but overall minimal improvement. Will pursue ECT at this time.   Obtained neurology consult to r/o autoimmune encephalitis; workup in plan  Pt remains catatonic.  Father postponed the first ECT treatment to Wednesday.  9/15  Patient responding well to ECT treatment and is improving, speaking in full sentences.   Discontinued lorazepam. Will continue ECT with plan for discharge next week to continue ECT as outpatient.  Decreased dose of Abilify due to increased feelings of anxiety    1. Admission status  9.27 (converted from 9.39)    2. Significant lab findings  UDS positive for THC    3. Psychotropic medications  - Escitalopram 10 mg daily (depression)  	- Home med  - Aripiprazole 5 mg daily (depression augmentation)  	- Started 9/1, inc 9/4, dec 9/22    Discontinued lorazepam 0.5 mg TID (catatonia)   	- Started 8/12, decreased 8/17, decreased 8/23, increased 8/25, increased 8/30, inc 9/7, dec 9/8, dec 9/15, dec 9/17, d/c 9/20  Discontinued quetiapine - patient with adequate sleep  Discontinued Bupropion XL - minimal effect      4. 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  anti-cardiolipin, p-ANCA, c-ANCA, anti-TPO, anti-thyroglobulin, NICK, anti-ß2 microglobulin, anti-dsDNA, C3 level, and C4 level.    B) ECT  - Consent for bifrontal ECT obtained  - Treatment #6 on 9/27  - NPO and hold lorazepam on evenings and mornings before ECT    5. Psychosocial interventions  A) Remaining in contact with father Dr. Knox

## 2021-09-24 NOTE — BH TREATMENT PLAN - NSCMSPTSTRENGTHS_PSY_ALL_CORE
Able to adapt/Compliance to treatment/Courageous/Expressive of emotions/Financial stability/Flexibility/Future/goal oriented/Highly motivated for treatment/Intact family/Intelligence/Interpersonal skills/Motivated/Positive attitude/Self confidence/Strong support system
Highly motivated for treatment/Intact family/Intelligence/Positive attitude/Strong support system/Successful coping history/Supportive family
Intact family/Intelligence/Physically healthy/Successful coping history/Supportive family
Intact family/Supportive family
Financial stability/Future/goal oriented/Intact family/Positive attitude/Supportive family
Financial stability/Future/goal oriented/Intact family/Positive attitude/Supportive family
Intact family/Intelligence/Physically healthy/Successful coping history/Supportive family

## 2021-09-24 NOTE — BH TREATMENT PLAN - NSTXPLANTHERAPYSESSIONSFT_PSY_ALL_CORE
08-19-21  Type of therapy: Patient has not attended groups over the past 7 days   Type of session: Individual  Level of patient participation: Not engaged,Resistance to participation  Duration of participation: Less than 15 minutes  Therapy conducted by: Psych rehab  Therapy Summary: Writer attempted to meet with patient for an individual session in order to review progress towards psychiatric rehabilitation goals. However, writer is completing most of this assessment from chart and observations due to patient being resistant to engage in this session with writer. Patient was uncooperative and guarded during session. Patient is not engaged in unit activities. Patient was not a behavioral management issue during past 7 days.     Patient has not attended psychiatric rehabilitation groups over the past seven days. Patient has not demonstrated improvement in mood. Over the past seven days, patient has been spending time in her room and not participating in any activites/groups on the unit. Patient continues to remain guarded and answering questions with "yes or no". Per observation, patient continues to present with depressed mood. Patient continues to experience anxiety while on the unit. Patient has been sleeping most of the day. Per unit staff, patient was urinating on self yesterday. Patient needs a lot of prompting in order for patient to get things done such as showering and doing laundry. Patient has been compliant with medications over the past 7 days. Patient has been appropriate with peers and staff. Patient was unable to provide writer with a self-rating CGI.  
  09-23-21  Type of therapy: Dialectical behavior therapy,Coping skills,Creative arts therapy,Mindfulness,Music therapy,Stress management,Symptom management  Type of session: Individual  Level of patient participation: Engaged  Duration of participation: 30 minutes  Therapy conducted by: Psych rehab  Therapy Summary: Pt made significant progress towards psychiatric rehabilitation goals. Pt came out to group therapy and community meeting regularly. Pt started ECT after much encouragement and pt stated it is helping her. Pt is smiling more and her affect is brighter. Pt is compliant with medication. Patient was more verbal today as reported and observed by writer. Patient was not a behavioral management issue during past 7 days. Pt stated she is not depressed. Pt is not psycho motor retarded as before. Patient is not catatonic as before. Patient is visible on the unit regularly now and socialized with peers. Patient has been appropriate with peers and staff. Patient's insight and judgement are improving. Pt is going outside for fresh air, bead making, listening to music, board games, started attending group therapy and using Art as coping skills. Pt denied SI/HI/AH/VH.  
  09-02-21  Type of therapy: Cognitive behavior therapy,Dialectical behavior therapy,Coping skills,Psychoeducation,Symptom management  Type of session: Individual  Level of patient participation: Participated with encouragement  Duration of participation: 15 minutes  Therapy conducted by: Psych rehab  Therapy Summary: Writer made minimal progress towards psychiatric rehabilitation goals. Patient was non verbal today and was nodding her head to answer yes no questions during session. Pt started to attend group therapy. Patient is minimally engaged in unit activities. Patient was not a behavioral management issue during past 7 days. Patient has attended approximately 40 percent of psychiatric rehabilitation groups over the past seven days. Patient has demonstrated limited improvement in blunted affect; mood is improving but still depressed. Patient continues to present somewhat catatonic but is improving gradually. Patient seems to be internally preoccupied. Patient has been compliant with medications over the past 7 days. Patient is visible on the unit at times. Patient has been appropriate with peers and staff. Patient's insight and judgement are poor. Patient was unable to provide writer with a self-rating CGI. Pt is going outside for fresh air, pacing the hallway, started attending group therapy and using Art as coping skills. Pt would benefit from utilizing ECT as discussed in clinical team.  
  09-09-21  Type of therapy: Cognitive behavior therapy,Coping skills,Creative arts therapy,Symptom management  Type of session: Individual  Level of patient participation: Participated with encouragement,Quiet  Duration of participation: 15 minutes  Therapy conducted by: Psych rehab  Therapy Summary: Writer made minimal progress towards psychiatric rehabilitation goals. Pt would benefit from utilizing ECT as discussed in clinical team; will be moving forward with this treatment plan. Pt refused medication last night but took it this morning. Patient was non verbal today and was nodding her head to answer yes no questions during session. Pt started to attend group therapy. Patient is minimally engaged in unit activities. Patient was not a behavioral management issue during past 7 days. Patient has attended approximately 15 percent of psychiatric rehabilitation groups over the past seven days. Patient has demonstrated limited improvement in flat affect; mood is improving but still depressed. Pt is psycho motor retarded. Patient continues to present catatonic but is improving gradually. Patient seems to be internally preoccupied. Patient is visible on the unit at times. Patient has been appropriate with peers and staff. Patient's insight and judgement are poor. Patient was unable to provide writer with a self-rating CGI. Pt is going outside for fresh air, pacing the hallway, started attending group therapy and using Art as coping skills. Pt denied SI/HI.  
  08-26-21  Type of therapy: Creative arts therapy,Patient's participation is groups is low   Type of session: Individual  Level of patient participation: Participated with encouragement  Duration of participation: Less than 15 minutes  Therapy conducted by: Psych rehab  Therapy Summary: Writer met with patient for an individual session in order to review progress towards psychiatric rehabilitation goals. Patient was non verbal today and was nodding her head to answer benson.no questions during session. Patient is minimally engaged in unit activities. Patient was not a behavioral management issue during past 7 days.     Patient has attended approximately 35 percent of psychiatric rehabilitation groups over the past seven days. Patient has demonstrated limited improvement in mood. Patient continues to presents somewhat catatonic. Patient laughs inappropriately at times. Patient seems to be internally preocuppied. Patient continues to present with depressed mood. Patint has been compliant with medications over the past 7 days. Patient is visible on the unit at times. Patient has been appropriate with peers and staff. Patient's insight and judgement are poor. Patient was unable to provide writer with a self-rating CGI.  
  09-16-21  Type of therapy: Cognitive behavior therapy,Coping skills,Psychoeducation,Symptom management  Type of session: Individual  Level of patient participation: Participated with encouragement  Duration of participation: 30 minutes  Therapy conducted by: Psych rehab  Therapy Summary: Pt made minimal progress towards psychiatric rehabilitation goals. Pt came out to group therapy today and community meeting. Pt has not been attending unit activities even with multiple promptings. Pt just started ECT after much encouragement. Pt is more compliant with medication.   Patient was more verbal today as reported by team. Patient was not a behavioral management issue during past 7 days. Patient has attended minimal psychiatric rehabilitation groups over the past seven days. Patient has demonstrated some improvement in her affect affect; mood is improving but still depressed. Pt is psycho motor retarded. Patient continues to present catatonic but is improving gradually. Patient is visible on the unit at times. Patient has been appropriate with peers and staff. Patient's insight and judgement are poor. Pt is going outside for fresh air, , started attending group therapy and using Art as coping skills. Pt denied SI/HI.

## 2021-09-24 NOTE — BH TREATMENT PLAN - NSTXANXINTERPR_PSY_ALL_CORE
Pt will continue to attend group therapy despite lingering symptoms.
Pt would attend group therapy despite symptoms by day 7.
Pt would continue to utilize coping skills by participating in activities such as group therapy despite symptoms by day 7.
Pt will continue to attend group therapy and be visible in milieu despite symptoms.
Psychiatric rehabilitation staff will continue to meet patient individually to provide support, encouragement, and counseling in order for patient to attend daily symptom management groups and be able to participate in activities despite lingering anxiety for improved symptom management and sustained recovery over seven days.
Psychiatric rehabilitation staff will continue to meet patient individually to provide support, encouragement, and counseling in order for patient to attend daily symptom management groups and be able to participate in activities despite lingering anxiety for improved symptom management and sustained recovery over seven days.

## 2021-09-24 NOTE — BH TREATMENT PLAN - NSBHPRIMARYDX_PSY_ALL_CORE
Severe episode of recurrent major depressive disorder, with psychotic features    
Catatonia    
Severe episode of recurrent major depressive disorder, with psychotic features    
Catatonia

## 2021-09-24 NOTE — BH TREATMENT PLAN - NSTXDEPRESINTERMD_PSY_ALL_CORE
Titrate aripiprazole
Titrate antidepressant
Titrate aripiprazole  ECT on Monday
Titrate antidepressants
Titrate bupropion to treat depression with psychmotor slowing
Continue ECT
Continue ECT, taper lorazepam

## 2021-09-24 NOTE — BH TREATMENT PLAN - NSTXPATIENTAGREEMENT_PSY_ALL_CORE
Patient unable to participate
Yes
Patient unable to participate

## 2021-09-24 NOTE — BH TREATMENT PLAN - NSTXPATIENTPARTICIPATE_PSY_ALL_CORE
No, patient unwilling to participate
No, patient unwilling to participate
Patient participated in identification of needs/problems/goals for treatment
No, patient unwilling to participate
Patient participated in identification of needs/problems/goals for treatment
Patient participated in identification of needs/problems/goals for treatment
No, patient unwilling to participate

## 2021-09-24 NOTE — BH TREATMENT PLAN - NSTXANXGOAL_PSY_ALL_CORE
Be able to participate in activities despite lingering anxiety/panic

## 2021-09-25 RX ADMIN — ARIPIPRAZOLE 5 MILLIGRAM(S): 15 TABLET ORAL at 08:02

## 2021-09-25 RX ADMIN — ESCITALOPRAM OXALATE 10 MILLIGRAM(S): 10 TABLET, FILM COATED ORAL at 08:02

## 2021-09-26 RX ADMIN — ARIPIPRAZOLE 5 MILLIGRAM(S): 15 TABLET ORAL at 09:27

## 2021-09-26 RX ADMIN — ESCITALOPRAM OXALATE 10 MILLIGRAM(S): 10 TABLET, FILM COATED ORAL at 09:27

## 2021-09-27 PROCEDURE — 90870 ELECTROCONVULSIVE THERAPY: CPT

## 2021-09-27 PROCEDURE — 99231 SBSQ HOSP IP/OBS SF/LOW 25: CPT | Mod: 25

## 2021-09-27 RX ORDER — ARIPIPRAZOLE 15 MG/1
1 TABLET ORAL
Qty: 30 | Refills: 0
Start: 2021-09-27 | End: 2021-10-26

## 2021-09-27 RX ORDER — ESCITALOPRAM OXALATE 10 MG/1
1 TABLET, FILM COATED ORAL
Qty: 30 | Refills: 0
Start: 2021-09-27 | End: 2021-10-26

## 2021-09-27 NOTE — ECT PRE-PROCEDURE CHECKLIST - NSECTNPODT_PSY_ALL_CORE
17-Sep-2021 00:00
19-Sep-2021 23:00
20-Sep-2021 08:20
27-Sep-2021 00:00
24-Sep-2021 00:00
14-Sep-2021 21:00
22-Sep-2021

## 2021-09-27 NOTE — BH DISCHARGE NOTE NURSING/SOCIAL WORK/PSYCH REHAB - NSDCADDINFO2FT_PSY_ALL_CORE
Please be informed you have been provided with ECT instructions that should be reviewed prior to ECT appointment (instructions related with food intake, etc).

## 2021-09-27 NOTE — BH INPATIENT PSYCHIATRY DISCHARGE NOTE - DESCRIPTION
lives with family in [place], father is medical director of spine services at [employer], at times travels from aunt's house to college as it is closer

## 2021-09-27 NOTE — BH DISCHARGE NOTE NURSING/SOCIAL WORK/PSYCH REHAB - NSBHDCADDR2FT_A_CORE
[facility] [id number] [place] [address], [place] [address]

## 2021-09-27 NOTE — ECT TREATMENT NOTE - NSECTREVSYS_PSY_ALL_CORE
Renal/Urologic/Other

## 2021-09-27 NOTE — ECT TREATMENT NOTE - NSECTRENUROCOMMENT_PSY_ALL_CORE
Asthma as a child

## 2021-09-27 NOTE — BH INPATIENT PSYCHIATRY PROGRESS NOTE - NSBHFUPINTERVALHXFT_PSY_A_CORE
Chart reviewed, including vital signs, medication administration record, lab results, and nursing notes.   Case discussed with nursing, psychology, psych rehab, and social work in team meeting.     Patient is seen in the day room, in no acute distress, amenable to interview. She exhibits a bright affect and is able to respond questions in full sentences. She acknowledges receiving ECT today. States that after discharge from the hospital, she hopes to participate in sports. States that she played volleyball when she was in high school. Denies any side effects from medication or from ECT. Reports stable sleep and appetite. Denies SI/HI/AVH.

## 2021-09-27 NOTE — BH DISCHARGE NOTE NURSING/SOCIAL WORK/PSYCH REHAB - PATIENT PORTAL LINK FT
You can access the FollowMyHealth Patient Portal offered by [facility] by registering at the following website: [email or web address]. By joining [facility]’s FollowMyHealth portal, you will also be able to view your health information using other applications (apps) compatible with our system.

## 2021-09-27 NOTE — ECT PRE-PROCEDURE CHECKLIST - NSPROEDALEARNPREF_GEN_A_NUR
audio/verbal instruction/video/written material

## 2021-09-27 NOTE — ECT PRE-PROCEDURE CHECKLIST - NSDISPOFBELONG_PSY_ALL_CORE
not applicable

## 2021-09-27 NOTE — BH DISCHARGE NOTE NURSING/SOCIAL WORK/PSYCH REHAB - DISCHARGE INSTRUCTIONS AFTERCARE APPOINTMENTS
In order to check the location, date, or time of your aftercare appointment, please refer to your Discharge Instructions Document given to you upon leaving the hospital.  If you have lost the instructions please call [phone]

## 2021-09-27 NOTE — ECT PRE-PROCEDURE CHECKLIST - ALLERGIES
Allergies:-  No Known Allergies      

## 2021-09-27 NOTE — BH DISCHARGE NOTE NURSING/SOCIAL WORK/PSYCH REHAB - NSCDUDCCRISIS_PSY_A_CORE
[facility]  [phone] [facility]-WELL ([phone])  Text "WELL" to [phone]  Website: www.[employer]/.[employer] 1 (386) 011-URGL (2071) Website: www.safehorizon.org/.National Suicide Prevention Lifeline 6 (370) 130-5764/.  [employer]  1 (159) LIF[employer] (011-5104)/.  [facility]  [address] [place] [address] [phone]   Hours:  Monday through Friday from 9 AM to 3 PM/.  [employer] - [facility] Crisis Line  8 ([phone] 057-8614, Option 1

## 2021-09-27 NOTE — BH INPATIENT PSYCHIATRY DISCHARGE NOTE - NSBHMETABOLIC_PSY_ALL_CORE_FT
BMI: BMI (kg/m2): 21.4 (08-11-21 @ 16:58)  HbA1c: A1C with Estimated Average Glucose Result: 5.3 % (08-13-21 @ 10:33)    Glucose:   BP: 135/67 (09-27-21 @ 09:40) (98/71 - 135/67)  Lipid Panel: Date/Time: 08-13-21 @ 10:33  Cholesterol, Serum: 160  Direct LDL: --  HDL Cholesterol, Serum: 62  Total Cholesterol/HDL Ration Measurement: --  Triglycerides, Serum: 50

## 2021-09-27 NOTE — ECT PRE-PROCEDURE CHECKLIST - CAREGIVER PHONE NUMBER
[phone]
[phone]
[phone]
[phone]
[phone]
[phone]
[phone]
[phone]
[phone]

## 2021-09-27 NOTE — BH INPATIENT PSYCHIATRY DISCHARGE NOTE - HOSPITAL COURSE
On admission interview, the patient presented with the following signs and symptoms.  - Mutism, poor PO intake, catalepsy, waxy flexibility, ambitendency  - Recent abuse of Adderall and cannabis    Patient was started on lorazepam and the dose of this was gradually increased to 6 mg daily. In addition, she was started on aripiprazole and titrated to 10 mg daily. Escitalopram (home medication) was continued. However, these measures only marginally improved her catatonic symptoms, and there was no consistent improvement over several weeks. Risks, benefits, and side effects of all medication prescribed were discussed in detail, including extrapyramidal symptoms, tardive dyskinesia, neuroleptic malignant syndrome, and the FDA-issued black box warning relaying the increased risk of suicidality for antidepressants in patients under the age of 24.    The patient demonstrated numerous symptoms of catatonia for the first three weeks of hospitalization, with poor PO intake. She was mostly nonverbal, and at times was even unable to respond to questions nonverbally. Her family was consistently involved in her care throughout hospitalization and reported lack of improvement. They were initially hesitant to try ECT to address catatonic symptoms, but after thorough psychoeducation her father consented for bifrontal ECT. The patient's catatonic symptoms resolved within a week of starting ECT, and she was able to speak in full sentences, move well, and demonstrated a bright affect. When she was eventually able to provide history, she reported that her catatonic symptoms began after she took Adderall to help study; she agrees to remain abstinent from stimulants in the future, but appeared ambivalent about abstaining from cannabis use.   No medical issues, restraints, or self-injurious behavior noted during the hospitalization.    At the time of discharge, the patient reported improved mood, exhibited a euthymic affect, and denied SI/HI/AVH or desire to self-harm. 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 The patient denied any intolerable side effects and agreed with the plan for discharge due to the patient’s confidence that treatment could be successfully continued as an outpatient.    Risk assessment:  On admission, acute risk factors included: Inability to attend to ADLs  Chronic risk factors included: Diagnosis of MDD, past psychiatric hospitalizations, substance use    Acute risk factors were mitigated during hospitalization and overall risk had returned to baseline levels by the time of discharge. However, the patient remains at elevated risk of suicide given chronic risk factors, which would not be reduced further by continued hospitalization and are best managed on an outpatient basis. In addition, the patient has numerous protective factors, including treatment adherence, close involvement of family throughout hospitalization, limited access to lethal means (reported by family and patient), social support, forward thinking regarding school/career, high premorbid functioning. The patient was able to engage in safety planning, and neither the patient nor family identified any barriers to discharge.   Therefore, the patient no longer met criteria for inpatient psychiatric hospitalization and was discharged from the 1S unit.     DSM-5 diagnosis:  Major depressive disorder, with catatonia  Stimulant use disorder, mild  Cannabis use disorder, mild    Discharge medication:  Escitalopram 10 mg daily  Aripiprazole 5 mg daily

## 2021-09-27 NOTE — BH INPATIENT PSYCHIATRY PROGRESS NOTE - NSBHASSESSSUMMFT_PSY_ALL_CORE
Patient is a 26 year-old female with history of depression and polysubstance use (marijuana, Adderall), brought in by family for worsening anxiety and poor self-care. Patient is tearful and nonverbal. She is noted to be grossly underweight, and has some catatonic features (mutism, staring, poor PO intake) but this may be more related to psychomotor slowing.   PsyHx: Seen by outpatient provider Dr. Merrill.   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 However it was noted that she became overly sedated, and the dose of lorazepam was reduced. Patient minimally verbal but able to respond to questions nonverbally. Patient started on the antidepressant bupropion to target psychomotor slowing. Discussed plan with father and outpatient provider Dr. Merrill who agree with plan to start the antidepressant bupropion.   8/25-9/1:  Patient exhibiting worsening symptoms of catatonia, particularly mutism, immobility, and catalepsy, despite resuming previous dose of lorazepam. Will monitor catatonia with Ruvalcaba Marty scale. Bupropion has been ineffective, so it was discontinued and replaced with aripiprazole. May consider augmentation with ECT at next stage, given her refractory symptoms.  9/2-9/14  Mild improvement in depressive symptoms since starting aripiprazole, but overall minimal improvement. Will pursue ECT at this time.   Obtained neurology consult to r/o autoimmune encephalitis; workup in plan  Pt remains catatonic.  Father postponed the first ECT treatment to Wednesday.  9/15  Patient responding well to ECT treatment and is improving, speaking in full sentences.   Discontinued lorazepam. Will continue ECT with plan for discharge next week to continue ECT as outpatient.  Decreased dose of Abilify due to increased feelings of anxiety    1. Admission status  9.27 (converted from 9.39)    2. Significant lab findings  UDS positive for THC    3. Psychotropic medications  - Escitalopram 10 mg daily (depression)  	- Home med  - Aripiprazole 5 mg daily (depression augmentation)  	- Started 9/1, inc 9/4, dec 9/22    Discontinued lorazepam 0.5 mg TID (catatonia)   	- Started 8/12, decreased 8/17, decreased 8/23, increased 8/25, increased 8/30, inc 9/7, dec 9/8, dec 9/15, dec 9/17, d/c 9/20  Discontinued quetiapine - patient with adequate sleep  Discontinued Bupropion XL - minimal effect      4. 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  anti-cardiolipin, p-ANCA, c-ANCA, anti-TPO, anti-thyroglobulin, NICK, anti-ß2 microglobulin, anti-dsDNA, C3 level, and C4 level.    B) ECT  - Consent for bifrontal ECT obtained  - Treatment #6 on 9/27  - NPO and hold lorazepam on evenings and mornings before ECT  - Weekly treatments to continue as outpatient    5. Psychosocial interventions  A) Remaining in contact with father Dr. Knox

## 2021-09-27 NOTE — BH DISCHARGE NOTE NURSING/SOCIAL WORK/PSYCH REHAB - NSDCPRGOAL_PSY_ALL_CORE
During the current hospitalization, patient has been addressing psychiatric rehabilitation goals pertaining to identifying coping skills that assist in decreasing depression and decreasing catatonia and depression. Patient has demonstrated progress towards psychiatric rehabilitation goals during the current hospitalization. Patient exhibited progress through participating in individual and group therapy and developing additional coping skills to assist with improving mood and impulsivity. Pt was able to identify warning signs to prevent relapse. Pt has been managing low mood, negative emotions with behavioral activation, emotional regulation, positive distraction and self soothing skills. Pt utilized coping skills such as ECT, positive distraction, mindfulness, radical acceptance for hospitalization, grounding, Art, socialization, journaling, meditation, self- soothing. Writer encouraged patient to continue to strengthen and practice effective skills. Patient met goal of identifying coping skills as evidenced by reporting these skills have helped her during current hospitalization. Patient was compliant with medication during current hospitalization. Patient was provided with a Press Ganey survey prior to discharge. Pt identified career/family as protective factor.

## 2021-09-27 NOTE — ECT PRE-PROCEDURE CHECKLIST - TO WHOM
procedural nurse
Procedural Room RN
Procedural Room RN
To RN in procedure room
Procedural Room RN
Procedural Room RN

## 2021-09-27 NOTE — ECT TREATMENT NOTE - NSECTCPTCODE_PSY_ALL_CORE
16425 (ECT-Electroconvulsive Therapy)
82685 (ECT-Electroconvulsive Therapy)
49298 (ECT-Electroconvulsive Therapy)
47721 (ECT-Electroconvulsive Therapy)
50405 (ECT-Electroconvulsive Therapy)
62886 (ECT-Electroconvulsive Therapy)
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
## 2021-09-27 NOTE — BH INPATIENT PSYCHIATRY DISCHARGE NOTE - NSDCCPCAREPLAN_GEN_ALL_CORE_FT
PRINCIPAL DISCHARGE DIAGNOSIS  Diagnosis: MDD (major depressive disorder)  Assessment and Plan of Treatment:       SECONDARY DISCHARGE DIAGNOSES  Diagnosis: Amphetamine abuse  Assessment and Plan of Treatment:

## 2021-09-27 NOTE — ECT TREATMENT NOTE - NSECTTXPERFDATETIME_PSY_ALL_CORE
24-Sep-2021 08:41
17-Sep-2021 09:09
15-Sep-2021 08:31
27-Sep-2021 09:00
20-Sep-2021 08:39
22-Sep-2021 14:16

## 2021-09-27 NOTE — BH DISCHARGE NOTE NURSING/SOCIAL WORK/PSYCH REHAB - NSBHDCAGENCY1FT_PSY_A_CORE
[facility]  Intake Team: Jennifer [facility]  Intake Team: Psychiatrist: Dr. Gonzalez

## 2021-09-27 NOTE — BH DISCHARGE NOTE NURSING/SOCIAL WORK/PSYCH REHAB - NSDCADDINFO1FT_PSY_ALL_CORE
You will receive a call first by the registration team to complete enrollment before your appointment date/time. You will then receive a call from the doctor at the time of your appointment 9/30/21 1:15PM. The team will further discuss the your treatment plan going forward with the program. Arrive to your appointment on time. Any questions feel free to call the  at [phone].
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
## 2021-09-27 NOTE — ECT TREATMENT NOTE - NSECTPOSTTXSUMMARY_PSY_ALL_CORE
The patient had a well modified grand mal seizure under general anesthesia and muscle relaxant. The patient is alert, responsive, in no acute distress.  Recovery uneventful.

## 2021-09-27 NOTE — ECT TREATMENT NOTE - NSICDXBHPRIMARYDX_PSY_ALL_CORE
Catatonia   F06.1  

## 2021-09-27 NOTE — BH DISCHARGE NOTE NURSING/SOCIAL WORK/PSYCH REHAB - NSDCPRRECOMMEND_PSY_ALL_CORE
Pt would benefit from following up with [facility]  for structure, medication and symptom management; continue coping skills as discussed such as mindfullness, reading the book on anxiety- bibliotherapy, opposite action, radical acceptance and self compassion. Pt would benefit from following up with ECT weekly and  [facility]  for structure, medication and symptom management; continue coping skills as discussed such as mindfulness, reading the book on anxiety- bibliotherapy, opposite action, radical acceptance and self compassion.

## 2021-09-27 NOTE — ECT PRE-PROCEDURE CHECKLIST - CAREGIVER ADDRESS
[address] [place] [id number]
[address] [place] [id number]
[address] [place] [phone]
[address] [place] [phone]
[address] [place] [phone]
[address] [place] [phone]
[address] [place] [phone]
[address] [place] [phone]
[address] [place] [phone]
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
## 2021-09-27 NOTE — ECT PRE-PROCEDURE CHECKLIST - NSPROEDAREADYLEARN_GEN_A_NUR
acuteness of illness

## 2021-09-28 VITALS — SYSTOLIC BLOOD PRESSURE: 118 MMHG | TEMPERATURE: 98 F | HEART RATE: 80 BPM | DIASTOLIC BLOOD PRESSURE: 76 MMHG

## 2021-09-28 PROCEDURE — 90853 GROUP PSYCHOTHERAPY: CPT

## 2021-09-28 PROCEDURE — 99238 HOSP IP/OBS DSCHRG MGMT 30/<: CPT | Mod: 25

## 2021-09-28 PROCEDURE — 99231 SBSQ HOSP IP/OBS SF/LOW 25: CPT | Mod: 25

## 2021-09-28 RX ADMIN — ARIPIPRAZOLE 5 MILLIGRAM(S): 15 TABLET ORAL at 09:12

## 2021-09-28 RX ADMIN — ESCITALOPRAM OXALATE 10 MILLIGRAM(S): 10 TABLET, FILM COATED ORAL at 09:12

## 2021-09-28 NOTE — BH INPATIENT PSYCHIATRY PROGRESS NOTE - NSTXDCOPLKPROGRES_PSY_ALL_CORE
Worsening
No Change
Improving
No Change
Worsening
Improving
Worsening
No Change
Worsening
Worsening
No Change
Worsening
No Change
Improving
No Change
No Change
Improving
Improving
Worsening
No Change
Worsening
Improving
Improving
No Change
No Change

## 2021-09-28 NOTE — BH INPATIENT PSYCHIATRY PROGRESS NOTE - NSBHCHARTREVIEWVS_PSY_A_CORE FT
Vital Signs Last 24 Hrs  T(C): 36.7 (09-13-21 @ 07:48), Max: 36.7 (09-13-21 @ 07:48)  T(F): 98.1 (09-13-21 @ 07:48), Max: 98.1 (09-13-21 @ 07:48)  HR: 83 (09-13-21 @ 07:48) (83 - 83)  BP: 114/80 (09-13-21 @ 07:48) (114/80 - 114/80)  BP(mean): --  RR: --  SpO2: --    
Vital Signs Last 24 Hrs  T(C): 36.7 (09-17-21 @ 09:50), Max: 36.8 (09-16-21 @ 20:44)  T(F): 98 (09-17-21 @ 09:50), Max: 98.3 (09-17-21 @ 07:05)  HR: 99 (09-17-21 @ 09:50) (68 - 115)  BP: 110/70 (09-17-21 @ 09:50) (98/66 - 121/81)  BP(mean): --  RR: 17 (09-17-21 @ 09:50) (15 - 22)  SpO2: 100% (09-17-21 @ 09:50) (98% - 100%)    
Vital Signs Last 24 Hrs  T(C): 36.2 (09-09-21 @ 06:46), Max: 36.2 (09-09-21 @ 06:46)  T(F): 97.2 (09-09-21 @ 06:46), Max: 97.2 (09-09-21 @ 06:46)  HR: 93 (09-09-21 @ 06:46) (93 - 93)  BP: 124/87 (09-09-21 @ 06:46) (124/87 - 124/87)  BP(mean): --  RR: --  SpO2: --    
Vital Signs Last 24 Hrs  T(C): 36.6 (20 Aug 2021 07:55), Max: 36.6 (20 Aug 2021 07:55)  T(F): 97.9 (20 Aug 2021 07:55), Max: 97.9 (20 Aug 2021 07:55)  HR: 105 (20 Aug 2021 07:55) (105 - 105)  BP: 102/63 (20 Aug 2021 07:55) (102/63 - 102/63)  BP(mean): --  RR: --  SpO2: --
Vital Signs Last 24 Hrs  T(C): 36.8 (09-23-21 @ 06:31), Max: 36.8 (09-23-21 @ 06:31)  T(F): 98.2 (09-23-21 @ 06:31), Max: 98.2 (09-23-21 @ 06:31)  HR: 84 (09-23-21 @ 06:31) (84 - 84)  BP: 117/71 (09-23-21 @ 06:31) (117/71 - 117/71)  BP(mean): --  RR: --  SpO2: --    
Vital Signs Last 24 Hrs  T(C): 36.8 (09-27-21 @ 09:40), Max: 36.8 (09-27-21 @ 09:40)  T(F): 98.3 (09-27-21 @ 09:40), Max: 98.3 (09-27-21 @ 09:40)  HR: 107 (09-27-21 @ 09:40) (79 - 107)  BP: 135/67 (09-27-21 @ 09:40) (113/73 - 135/67)  BP(mean): --  RR: 16 (09-27-21 @ 09:40) (12 - 16)  SpO2: 100% (09-27-21 @ 09:40) (100% - 100%)    
Vital Signs Last 24 Hrs  T(C): 36.4 (23 Aug 2021 06:20), Max: 37.1 (22 Aug 2021 20:31)  T(F): 97.5 (23 Aug 2021 06:20), Max: 98.8 (22 Aug 2021 20:31)  HR: 123 (23 Aug 2021 06:20) (123 - 123)  BP: 124/79 (23 Aug 2021 06:20) (124/79 - 124/79)  BP(mean): --  RR: --  SpO2: --
Vital Signs Last 24 Hrs  T(C): 36.5 (19 Aug 2021 07:59), Max: 37.6 (18 Aug 2021 20:43)  T(F): 97.7 (19 Aug 2021 07:59), Max: 99.7 (18 Aug 2021 20:43)  HR: 98 (19 Aug 2021 07:59) (98 - 98)  BP: 98/72 (19 Aug 2021 07:59) (98/72 - 98/72)  BP(mean): --  RR: --  SpO2: --
Vital Signs Last 24 Hrs  T(C): 36.8 (18 Aug 2021 07:47), Max: 37.6 (17 Aug 2021 20:45)  T(F): 98.3 (18 Aug 2021 07:47), Max: 99.6 (17 Aug 2021 20:45)  HR: 89 (18 Aug 2021 07:47) (89 - 89)  BP: 97/62 (18 Aug 2021 07:47) (97/62 - 97/62)  BP(mean): --  RR: --  SpO2: --
Vital Signs Last 24 Hrs  T(C): 36.7 (08-27-21 @ 08:23), Max: 36.8 (08-26-21 @ 20:16)  T(F): 98.1 (08-27-21 @ 08:23), Max: 98.2 (08-26-21 @ 20:16)  HR: --  BP: --  BP(mean): --  RR: --  SpO2: --    Orthostatic VS  08-27-21 @ 08:23  Lying BP: --/-- HR: --  Sitting BP: 116/76 HR: 94  Standing BP: --/-- HR: --  Site: --  Mode: --  
Vital Signs Last 24 Hrs  T(C): 36.4 (09-07-21 @ 07:03), Max: 36.4 (09-07-21 @ 07:03)  T(F): 97.5 (09-07-21 @ 07:03), Max: 97.5 (09-07-21 @ 07:03)  HR: 95 (09-07-21 @ 07:03) (95 - 95)  BP: 112/80 (09-07-21 @ 07:03) (112/80 - 112/80)  BP(mean): --  RR: --  SpO2: --    
Vital Signs Last 24 Hrs  T(C): 37.2 (23 Aug 2021 20:31), Max: 37.2 (23 Aug 2021 20:31)  T(F): 98.9 (23 Aug 2021 20:31), Max: 98.9 (23 Aug 2021 20:31)  HR: --  BP: --  BP(mean): --  RR: --  SpO2: --
Vital Signs Last 24 Hrs  T(C): 36.4 (09-03-21 @ 07:53), Max: 36.5 (09-02-21 @ 20:59)  T(F): 97.6 (09-03-21 @ 07:53), Max: 97.7 (09-02-21 @ 20:59)  HR: 105 (09-03-21 @ 07:53) (105 - 105)  BP: 118/70 (09-03-21 @ 07:53) (118/70 - 118/70)  BP(mean): --  RR: --  SpO2: --    Orthostatic VS  09-02-21 @ 06:30  Lying BP: --/-- HR: --  Sitting BP: 107/71 HR: 112  Standing BP: --/-- HR: --  Site: --  Mode: --  
Vital Signs Last 24 Hrs  T(C): 36.8 (09-15-21 @ 09:10), Max: 36.9 (09-15-21 @ 08:04)  T(F): 98.3 (09-15-21 @ 09:10), Max: 98.4 (09-15-21 @ 08:04)  HR: 107 (09-15-21 @ 09:10) (80 - 107)  BP: 118/78 (09-15-21 @ 09:10) (111/76 - 118/78)  BP(mean): --  RR: 15 (09-15-21 @ 09:10) (15 - 20)  SpO2: 100% (09-15-21 @ 09:10) (97% - 100%)    Orthostatic VS  09-15-21 @ 06:21  Lying BP: --/-- HR: --  Sitting BP: 106/67 HR: 77  Standing BP: --/-- HR: --  Site: --  Mode: --  Orthostatic VS  09-14-21 @ 07:45  Lying BP: --/-- HR: --  Sitting BP: 110/71 HR: 88  Standing BP: --/-- HR: --  Site: --  Mode: --  
Vital Signs Last 24 Hrs  T(C): 36.6 (08-26-21 @ 07:32), Max: 37.6 (08-25-21 @ 20:53)  T(F): 97.9 (08-26-21 @ 07:32), Max: 99.6 (08-25-21 @ 20:53)  HR: 100 (08-26-21 @ 07:32) (100 - 100)  BP: 105/72 (08-26-21 @ 07:32) (105/72 - 105/72)  BP(mean): --  RR: --  SpO2: --    
Vital Signs Last 24 Hrs  T(C): 36.4 (16 Aug 2021 07:00), Max: 37.2 (15 Aug 2021 21:31)  T(F): 97.6 (16 Aug 2021 07:00), Max: 98.9 (15 Aug 2021 21:31)  HR: 86 (16 Aug 2021 07:00) (86 - 86)  BP: 110/75 (16 Aug 2021 07:00) (110/75 - 110/75)  BP(mean): --  RR: --  SpO2: --
Vital Signs Last 24 Hrs  T(C): 36.4 (09-01-21 @ 06:15), Max: 36.8 (08-31-21 @ 20:32)  T(F): 97.5 (09-01-21 @ 06:15), Max: 98.3 (08-31-21 @ 20:32)  HR: 103 (09-01-21 @ 06:15) (97 - 103)  BP: 121/76 (09-01-21 @ 06:15) (121/76 - 135/87)  BP(mean): --  RR: --  SpO2: 100% (08-31-21 @ 22:37) (100% - 100%)    
Vital Signs Last 24 Hrs  T(C): 36.3 (13 Aug 2021 08:01), Max: 37.4 (12 Aug 2021 20:44)  T(F): 97.3 (13 Aug 2021 08:01), Max: 99.4 (12 Aug 2021 20:44)  HR: 84 (13 Aug 2021 08:01) (84 - 84)  BP: 107/78 (13 Aug 2021 08:01) (107/78 - 107/78)  BP(mean): --  RR: --  SpO2: --
Vital Signs Last 24 Hrs  T(C): 36.6 (08-25-21 @ 07:46), Max: 37.4 (08-24-21 @ 20:42)  T(F): 97.9 (08-25-21 @ 07:46), Max: 99.4 (08-24-21 @ 20:42)  HR: 102 (08-25-21 @ 07:46) (102 - 102)  BP: 126/86 (08-25-21 @ 07:46) (126/86 - 126/86)  BP(mean): --  RR: --  SpO2: --    
Vital Signs Last 24 Hrs  T(C): 36.3 (14 Aug 2021 06:57), Max: 36.3 (14 Aug 2021 06:57)  T(F): 97.4 (14 Aug 2021 06:57), Max: 97.4 (14 Aug 2021 06:57)  HR: --  BP: --  BP(mean): --  RR: --  SpO2: --
Vital Signs Last 24 Hrs  T(C): 36.3 (12 Aug 2021 07:43), Max: 36.9 (11 Aug 2021 16:58)  T(F): 97.4 (12 Aug 2021 07:43), Max: 98.4 (11 Aug 2021 16:58)  HR: --  BP: --  BP(mean): --  RR: 16 (11 Aug 2021 16:58) (16 - 16)  SpO2: --
Vital Signs Last 24 Hrs  T(C): 36.3 (09-10-21 @ 08:03), Max: 36.4 (09-09-21 @ 20:52)  T(F): 97.4 (09-10-21 @ 08:03), Max: 97.6 (09-09-21 @ 20:52)  HR: 81 (09-10-21 @ 08:03) (81 - 81)  BP: 106/75 (09-10-21 @ 08:03) (106/75 - 106/75)  BP(mean): --  RR: --  SpO2: --    
Vital Signs Last 24 Hrs  T(C): 36.5 (09-28-21 @ 08:31), Max: 37.1 (09-27-21 @ 20:56)  T(F): 97.7 (09-28-21 @ 08:31), Max: 98.8 (09-27-21 @ 20:56)  HR: 80 (09-28-21 @ 08:31) (80 - 80)  BP: 118/76 (09-28-21 @ 08:31) (118/76 - 118/76)  BP(mean): --  RR: --  SpO2: --    
Vital Signs Last 24 Hrs  T(C): 36.7 (09-16-21 @ 06:52), Max: 36.7 (09-16-21 @ 06:52)  T(F): 98 (09-16-21 @ 06:52), Max: 98 (09-16-21 @ 06:52)  HR: 84 (09-16-21 @ 06:52) (84 - 84)  BP: 113/74 (09-16-21 @ 06:52) (113/74 - 113/74)  BP(mean): --  RR: --  SpO2: --    Orthostatic VS  09-15-21 @ 06:21  Lying BP: --/-- HR: --  Sitting BP: 106/67 HR: 77  Standing BP: --/-- HR: --  Site: --  Mode: --  
Vital Signs Last 24 Hrs  T(C): 36.5 (15 Aug 2021 08:09), Max: 36.8 (14 Aug 2021 20:46)  T(F): 97.7 (15 Aug 2021 08:09), Max: 98.2 (14 Aug 2021 20:46)  HR: 75 (15 Aug 2021 08:09) (75 - 75)  BP: 109/78 (15 Aug 2021 08:09) (109/78 - 109/78)  BP(mean): --  RR: --  SpO2: --
Vital Signs Last 24 Hrs  T(C): 36.7 (09-20-21 @ 09:42), Max: 37.2 (09-20-21 @ 06:48)  T(F): 98 (09-20-21 @ 09:42), Max: 98.9 (09-20-21 @ 06:48)  HR: 99 (09-20-21 @ 09:42) (70 - 116)  BP: 110/72 (09-20-21 @ 09:42) (102/70 - 119/83)  BP(mean): --  RR: 16 (09-20-21 @ 09:20) (16 - 22)  SpO2: 100% (09-20-21 @ 09:20) (99% - 100%)    Orthostatic VS  09-19-21 @ 06:19  Lying BP: --/-- HR: --  Sitting BP: 98/65 HR: 81  Standing BP: --/-- HR: --  Site: --  Mode: --  
Vital Signs Last 24 Hrs  T(C): 36.9 (09-21-21 @ 08:12), Max: 36.9 (09-21-21 @ 08:12)  T(F): 98.4 (09-21-21 @ 08:12), Max: 98.4 (09-21-21 @ 08:12)  HR: 90 (09-21-21 @ 08:12) (86 - 90)  BP: 107/64 (09-21-21 @ 08:12) (97/64 - 107/64)  BP(mean): --  RR: --  SpO2: --    
Vital Signs Last 24 Hrs  T(C): 36 (17 Aug 2021 07:59), Max: 37.2 (16 Aug 2021 20:32)  T(F): 96.8 (17 Aug 2021 07:59), Max: 99 (16 Aug 2021 20:32)  HR: 67 (17 Aug 2021 07:59) (67 - 67)  BP: 95/60 (17 Aug 2021 07:59) (95/60 - 95/60)  BP(mean): --  RR: 17 (17 Aug 2021 07:59) (17 - 17)  SpO2: --
Vital Signs Last 24 Hrs  T(C): 36.4 (09-05-21 @ 06:42), Max: 36.4 (09-05-21 @ 06:42)  T(F): 97.6 (09-05-21 @ 06:42), Max: 97.6 (09-05-21 @ 06:42)  HR: 90 (09-05-21 @ 06:42) (90 - 90)  BP: 96/70 (09-05-21 @ 06:42) (96/70 - 96/70)  BP(mean): --  RR: --  SpO2: --    
Vital Signs Last 24 Hrs  T(C): 36.4 (09-02-21 @ 06:30), Max: 37.8 (09-01-21 @ 19:48)  T(F): 97.5 (09-02-21 @ 06:30), Max: 100 (09-01-21 @ 19:48)  HR: --  BP: --  BP(mean): --  RR: 18 (09-02-21 @ 06:30) (18 - 18)  SpO2: --    Orthostatic VS  09-02-21 @ 06:30  Lying BP: --/-- HR: --  Sitting BP: 107/71 HR: 112  Standing BP: --/-- HR: --  Site: --  Mode: --  
Vital Signs Last 24 Hrs  T(C): 36.7 (09-22-21 @ 08:21), Max: 36.7 (09-21-21 @ 21:03)  T(F): 98 (09-22-21 @ 08:21), Max: 98.1 (09-21-21 @ 21:03)  HR: 93 (09-22-21 @ 08:21) (93 - 93)  BP: 107/69 (09-22-21 @ 08:21) (107/69 - 107/69)  BP(mean): --  RR: --  SpO2: --    
Vital Signs Last 24 Hrs  T(C): 36.8 (09-24-21 @ 09:10), Max: 36.8 (09-23-21 @ 20:30)  T(F): 98.2 (09-24-21 @ 09:10), Max: 98.3 (09-23-21 @ 20:30)  HR: 70 (09-24-21 @ 09:25) (70 - 102)  BP: 118/78 (09-24-21 @ 09:25) (112/71 - 119/58)  BP(mean): --  RR: 18 (09-24-21 @ 09:25) (18 - 20)  SpO2: 98% (09-24-21 @ 09:25) (97% - 99%)    
Vital Signs Last 24 Hrs  T(C): 36.4 (08-30-21 @ 07:46), Max: 36.8 (08-29-21 @ 20:49)  T(F): 97.5 (08-30-21 @ 07:46), Max: 98.3 (08-29-21 @ 20:49)  HR: 87 (08-30-21 @ 07:46) (87 - 87)  BP: 118/81 (08-30-21 @ 07:46) (118/81 - 118/81)  BP(mean): --  RR: --  SpO2: --    
Vital Signs Last 24 Hrs  T(C): 36.6 (09-14-21 @ 07:45), Max: 36.6 (09-13-21 @ 20:16)  T(F): 97.8 (09-14-21 @ 07:45), Max: 97.9 (09-13-21 @ 20:16)  HR: --  BP: --  BP(mean): --  RR: --  SpO2: --    Orthostatic VS  09-14-21 @ 07:45  Lying BP: --/-- HR: --  Sitting BP: 110/71 HR: 88  Standing BP: --/-- HR: --  Site: --  Mode: --  
Vital Signs Last 24 Hrs  T(C): 36.4 (08-31-21 @ 08:09), Max: 37.4 (08-30-21 @ 20:24)  T(F): 97.6 (08-31-21 @ 08:09), Max: 99.3 (08-30-21 @ 20:24)  HR: 105 (08-31-21 @ 08:09) (105 - 105)  BP: 107/70 (08-31-21 @ 08:09) (107/70 - 107/70)  BP(mean): --  RR: --  SpO2: --    
Vital Signs Last 24 Hrs  T(C): 36.5 (09-08-21 @ 08:05), Max: 36.5 (09-08-21 @ 08:05)  T(F): 97.7 (09-08-21 @ 08:05), Max: 97.7 (09-08-21 @ 08:05)  HR: 90 (09-08-21 @ 08:05) (90 - 90)  BP: 118/68 (09-08-21 @ 08:05) (118/68 - 118/68)  BP(mean): --  RR: --  SpO2: --

## 2021-09-28 NOTE — BH INPATIENT PSYCHIATRY PROGRESS NOTE - NSBHMSEJUDGE_PSY_A_CORE
Poor
Good
Poor
Unable to assess
Fair
Good
Poor
Fair
Poor
Good
Poor
Fair
Good
Poor
Unable to assess
Poor
Fair
Poor
Fair

## 2021-09-28 NOTE — BH INPATIENT PSYCHIATRY PROGRESS NOTE - NSTXDEPRESGOAL_PSY_ALL_CORE
Will identify 2 coping skills that assist in improving mood
Will identify 2 coping skills that assist in improving mood
Attend and participate in at least 2 groups daily despite low mood/energy
Will identify 2 coping skills that assist in improving mood
Attend and participate in at least 2 groups daily despite low mood/energy
Will identify 2 coping skills that assist in improving mood
Attend and participate in at least 2 groups daily despite low mood/energy
Will identify 2 coping skills that assist in improving mood
Attend and participate in at least 2 groups daily despite low mood/energy
Attend and participate in at least 2 groups daily despite low mood/energy
Will identify 2 coping skills that assist in improving mood
Attend and participate in at least 2 groups daily despite low mood/energy
Will identify 2 coping skills that assist in improving mood
Attend and participate in at least 2 groups daily despite low mood/energy
Attend and participate in at least 2 groups daily despite low mood/energy
Will identify 2 coping skills that assist in improving mood
Attend and participate in at least 2 groups daily despite low mood/energy
Attend and participate in at least 2 groups daily despite low mood/energy
Will identify 2 coping skills that assist in improving mood
Attend and participate in at least 2 groups daily despite low mood/energy
Will identify 2 coping skills that assist in improving mood
Will identify 2 coping skills that assist in improving mood

## 2021-09-28 NOTE — BH INPATIENT PSYCHIATRY PROGRESS NOTE - NSBHMSEINSIGHT_PSY_A_CORE
Good
Poor
Good
Fair
Poor
Fair
Poor
Poor
Fair
Poor
Unable to assess
Fair
Good
Poor
Good
Good
Poor
Poor
Unable to assess
Poor
Fair
Good
Poor

## 2021-09-28 NOTE — BH INPATIENT PSYCHIATRY PROGRESS NOTE - NSBHMETABOLIC_PSY_ALL_CORE_FT
BMI: BMI (kg/m2): 21.4 (08-11-21 @ 16:58)  HbA1c: A1C with Estimated Average Glucose Result: 5.3 % (08-13-21 @ 10:33)    Glucose:   BP: 124/79 (08-23-21 @ 06:20) (119/83 - 124/79)  Lipid Panel: Date/Time: 08-13-21 @ 10:33  Cholesterol, Serum: 160  Direct LDL: --  HDL Cholesterol, Serum: 62  Total Cholesterol/HDL Ration Measurement: --  Triglycerides, Serum: 50  
BMI: BMI (kg/m2): 21.4 (08-11-21 @ 16:58)  HbA1c: A1C with Estimated Average Glucose Result: 5.3 % (08-13-21 @ 10:33)    Glucose:   BP: 107/64 (09-21-21 @ 08:12) (97/64 - 119/83)  Lipid Panel: Date/Time: 08-13-21 @ 10:33  Cholesterol, Serum: 160  Direct LDL: --  HDL Cholesterol, Serum: 62  Total Cholesterol/HDL Ration Measurement: --  Triglycerides, Serum: 50  
BMI: BMI (kg/m2): 21.4 (08-11-21 @ 16:58)  HbA1c: A1C with Estimated Average Glucose Result: 5.3 % (08-13-21 @ 10:33)    Glucose:   BP: 110/75 (08-16-21 @ 07:00) (109/78 - 110/75)  Lipid Panel: Date/Time: 08-13-21 @ 10:33  Cholesterol, Serum: 160  Direct LDL: --  HDL Cholesterol, Serum: 62  Total Cholesterol/HDL Ration Measurement: --  Triglycerides, Serum: 50  
BMI: BMI (kg/m2): 21.4 (08-11-21 @ 16:58)  HbA1c: A1C with Estimated Average Glucose Result: 5.3 % (08-13-21 @ 10:33)    Glucose:   BP: 118/78 (09-15-21 @ 09:10) (107/64 - 118/78)  Lipid Panel: Date/Time: 08-13-21 @ 10:33  Cholesterol, Serum: 160  Direct LDL: --  HDL Cholesterol, Serum: 62  Total Cholesterol/HDL Ration Measurement: --  Triglycerides, Serum: 50  
BMI: BMI (kg/m2): 21.4 (08-11-21 @ 16:58)  HbA1c: A1C with Estimated Average Glucose Result: 5.3 % (08-13-21 @ 10:33)    Glucose:   BP: 124/79 (08-23-21 @ 06:20) (119/83 - 124/79)  Lipid Panel: Date/Time: 08-13-21 @ 10:33  Cholesterol, Serum: 160  Direct LDL: --  HDL Cholesterol, Serum: 62  Total Cholesterol/HDL Ration Measurement: --  Triglycerides, Serum: 50  
BMI: BMI (kg/m2): 21.4 (08-11-21 @ 16:58)  HbA1c: A1C with Estimated Average Glucose Result: 5.3 % (08-13-21 @ 10:33)    Glucose:   BP: 107/70 (08-31-21 @ 08:09) (107/70 - 118/81)  Lipid Panel: Date/Time: 08-13-21 @ 10:33  Cholesterol, Serum: 160  Direct LDL: --  HDL Cholesterol, Serum: 62  Total Cholesterol/HDL Ration Measurement: --  Triglycerides, Serum: 50  
BMI: BMI (kg/m2): 21.4 (08-11-21 @ 16:58)  HbA1c: A1C with Estimated Average Glucose Result: 5.3 % (08-13-21 @ 10:33)    Glucose:   BP: 118/76 (09-28-21 @ 08:31) (101/71 - 135/67)  Lipid Panel: Date/Time: 08-13-21 @ 10:33  Cholesterol, Serum: 160  Direct LDL: --  HDL Cholesterol, Serum: 62  Total Cholesterol/HDL Ration Measurement: --  Triglycerides, Serum: 50  
BMI: BMI (kg/m2): 21.4 (08-11-21 @ 16:58)  HbA1c: A1C with Estimated Average Glucose Result: 5.3 % (08-13-21 @ 10:33)    Glucose:   BP: 124/87 (09-09-21 @ 06:46) (112/80 - 124/87)  Lipid Panel: Date/Time: 08-13-21 @ 10:33  Cholesterol, Serum: 160  Direct LDL: --  HDL Cholesterol, Serum: 62  Total Cholesterol/HDL Ration Measurement: --  Triglycerides, Serum: 50  
BMI: BMI (kg/m2): 21.4 (08-11-21 @ 16:58)  HbA1c: A1C with Estimated Average Glucose Result: 5.3 % (08-13-21 @ 10:33)    Glucose:   BP: 110/72 (09-20-21 @ 09:42) (92/50 - 119/83)  Lipid Panel: Date/Time: 08-13-21 @ 10:33  Cholesterol, Serum: 160  Direct LDL: --  HDL Cholesterol, Serum: 62  Total Cholesterol/HDL Ration Measurement: --  Triglycerides, Serum: 50  
BMI: BMI (kg/m2): 21.4 (08-11-21 @ 16:58)  HbA1c: A1C with Estimated Average Glucose Result: 5.3 % (08-13-21 @ 10:33)    Glucose:   BP: 121/76 (09-01-21 @ 06:15) (107/70 - 135/87)  Lipid Panel: Date/Time: 08-13-21 @ 10:33  Cholesterol, Serum: 160  Direct LDL: --  HDL Cholesterol, Serum: 62  Total Cholesterol/HDL Ration Measurement: --  Triglycerides, Serum: 50  
BMI: BMI (kg/m2): 21.4 (08-11-21 @ 16:58)  HbA1c: A1C with Estimated Average Glucose Result: 5.3 % (08-13-21 @ 10:33)    Glucose:   BP: 113/74 (09-16-21 @ 06:52) (111/76 - 118/78)  Lipid Panel: Date/Time: 08-13-21 @ 10:33  Cholesterol, Serum: 160  Direct LDL: --  HDL Cholesterol, Serum: 62  Total Cholesterol/HDL Ration Measurement: --  Triglycerides, Serum: 50  
BMI: BMI (kg/m2): 21.4 (08-11-21 @ 16:58)  HbA1c: A1C with Estimated Average Glucose Result: 5.3 % (08-13-21 @ 10:33)    Glucose:   BP: 106/75 (09-10-21 @ 08:03) (106/75 - 124/87)  Lipid Panel: Date/Time: 08-13-21 @ 10:33  Cholesterol, Serum: 160  Direct LDL: --  HDL Cholesterol, Serum: 62  Total Cholesterol/HDL Ration Measurement: --  Triglycerides, Serum: 50  
BMI: BMI (kg/m2): 21.4 (08-11-21 @ 16:58)  HbA1c: A1C with Estimated Average Glucose Result: 5.3 % (08-13-21 @ 10:33)    Glucose:   BP: 107/64 (09-13-21 @ 13:10) (107/64 - 132/75)  Lipid Panel: Date/Time: 08-13-21 @ 10:33  Cholesterol, Serum: 160  Direct LDL: --  HDL Cholesterol, Serum: 62  Total Cholesterol/HDL Ration Measurement: --  Triglycerides, Serum: 50  
BMI: BMI (kg/m2): 21.4 (08-11-21 @ 16:58)  HbA1c: A1C with Estimated Average Glucose Result: 5.3 % (08-13-21 @ 10:33)    Glucose:   BP: 107/69 (09-22-21 @ 08:21) (97/64 - 119/83)  Lipid Panel: Date/Time: 08-13-21 @ 10:33  Cholesterol, Serum: 160  Direct LDL: --  HDL Cholesterol, Serum: 62  Total Cholesterol/HDL Ration Measurement: --  Triglycerides, Serum: 50  
BMI: BMI (kg/m2): 21.4 (08-11-21 @ 16:58)  HbA1c: A1C with Estimated Average Glucose Result: 5.3 % (08-13-21 @ 10:33)    Glucose:   BP: 109/78 (08-15-21 @ 08:09) (107/78 - 109/78)  Lipid Panel: Date/Time: 08-13-21 @ 10:33  Cholesterol, Serum: 160  Direct LDL: --  HDL Cholesterol, Serum: 62  Total Cholesterol/HDL Ration Measurement: --  Triglycerides, Serum: 50  
BMI: BMI (kg/m2): 21.4 (08-11-21 @ 16:58)  HbA1c: A1C with Estimated Average Glucose Result: 5.3 % (08-13-21 @ 10:33)    Glucose:   BP: 97/62 (08-18-21 @ 07:47) (95/60 - 110/75)  Lipid Panel: Date/Time: 08-13-21 @ 10:33  Cholesterol, Serum: 160  Direct LDL: --  HDL Cholesterol, Serum: 62  Total Cholesterol/HDL Ration Measurement: --  Triglycerides, Serum: 50  
BMI: BMI (kg/m2): 21.4 (08-11-21 @ 16:58)  HbA1c: A1C with Estimated Average Glucose Result: 5.3 % (08-13-21 @ 10:33)    Glucose:   BP: 118/78 (09-24-21 @ 09:25) (93/64 - 121/79)  Lipid Panel: Date/Time: 08-13-21 @ 10:33  Cholesterol, Serum: 160  Direct LDL: --  HDL Cholesterol, Serum: 62  Total Cholesterol/HDL Ration Measurement: --  Triglycerides, Serum: 50  
BMI: BMI (kg/m2): 21.4 (08-11-21 @ 16:58)  HbA1c:   Glucose:   BP: 136/84 (08-11-21 @ 13:07) (136/84 - 136/84)  Lipid Panel: 
BMI: BMI (kg/m2): 21.4 (08-11-21 @ 16:58)  HbA1c: A1C with Estimated Average Glucose Result: 5.3 % (08-13-21 @ 10:33)    Glucose:   BP: 114/80 (09-13-21 @ 07:48) (97/60 - 132/75)  Lipid Panel: Date/Time: 08-13-21 @ 10:33  Cholesterol, Serum: 160  Direct LDL: --  HDL Cholesterol, Serum: 62  Total Cholesterol/HDL Ration Measurement: --  Triglycerides, Serum: 50  
BMI: BMI (kg/m2): 21.4 (08-11-21 @ 16:58)  HbA1c: A1C with Estimated Average Glucose Result: 5.3 % (08-13-21 @ 10:33)    Glucose:   BP: 95/60 (08-17-21 @ 07:59) (95/60 - 110/75)  Lipid Panel: Date/Time: 08-13-21 @ 10:33  Cholesterol, Serum: 160  Direct LDL: --  HDL Cholesterol, Serum: 62  Total Cholesterol/HDL Ration Measurement: --  Triglycerides, Serum: 50  
BMI: BMI (kg/m2): 21.4 (08-11-21 @ 16:58)  HbA1c: A1C with Estimated Average Glucose Result: 5.3 % (08-13-21 @ 10:33)    Glucose:   BP: 135/67 (09-27-21 @ 09:40) (98/71 - 135/67)  Lipid Panel: Date/Time: 08-13-21 @ 10:33  Cholesterol, Serum: 160  Direct LDL: --  HDL Cholesterol, Serum: 62  Total Cholesterol/HDL Ration Measurement: --  Triglycerides, Serum: 50  
BMI: BMI (kg/m2): 21.4 (08-11-21 @ 16:58)  HbA1c: A1C with Estimated Average Glucose Result: 5.3 % (08-13-21 @ 10:33)    Glucose:   BP: 105/72 (08-26-21 @ 07:32) (105/72 - 126/86)  Lipid Panel: Date/Time: 08-13-21 @ 10:33  Cholesterol, Serum: 160  Direct LDL: --  HDL Cholesterol, Serum: 62  Total Cholesterol/HDL Ration Measurement: --  Triglycerides, Serum: 50  
BMI: BMI (kg/m2): 21.4 (08-11-21 @ 16:58)  HbA1c: A1C with Estimated Average Glucose Result: 5.3 % (08-13-21 @ 10:33)    Glucose:   BP: 110/70 (09-17-21 @ 09:50) (98/66 - 121/81)  Lipid Panel: Date/Time: 08-13-21 @ 10:33  Cholesterol, Serum: 160  Direct LDL: --  HDL Cholesterol, Serum: 62  Total Cholesterol/HDL Ration Measurement: --  Triglycerides, Serum: 50  
BMI: BMI (kg/m2): 21.4 (08-11-21 @ 16:58)  HbA1c: A1C with Estimated Average Glucose Result: 5.3 % (08-13-21 @ 10:33)    Glucose:   BP: 112/80 (09-07-21 @ 07:03) (96/70 - 123/85)  Lipid Panel: Date/Time: 08-13-21 @ 10:33  Cholesterol, Serum: 160  Direct LDL: --  HDL Cholesterol, Serum: 62  Total Cholesterol/HDL Ration Measurement: --  Triglycerides, Serum: 50  
BMI: BMI (kg/m2): 21.4 (08-11-21 @ 16:58)  HbA1c: A1C with Estimated Average Glucose Result: 5.3 % (08-13-21 @ 10:33)    Glucose:   BP: 118/81 (08-30-21 @ 07:46) (115/84 - 118/81)  Lipid Panel: Date/Time: 08-13-21 @ 10:33  Cholesterol, Serum: 160  Direct LDL: --  HDL Cholesterol, Serum: 62  Total Cholesterol/HDL Ration Measurement: --  Triglycerides, Serum: 50  
BMI: BMI (kg/m2): 21.4 (08-11-21 @ 16:58)  HbA1c: A1C with Estimated Average Glucose Result: 5.3 % (08-13-21 @ 10:33)    Glucose:   BP: 102/63 (08-20-21 @ 07:55) (97/62 - 102/63)  Lipid Panel: Date/Time: 08-13-21 @ 10:33  Cholesterol, Serum: 160  Direct LDL: --  HDL Cholesterol, Serum: 62  Total Cholesterol/HDL Ration Measurement: --  Triglycerides, Serum: 50  
BMI: BMI (kg/m2): 21.4 (08-11-21 @ 16:58)  HbA1c: A1C with Estimated Average Glucose Result: 5.3 % (08-13-21 @ 10:33)    Glucose:   BP: 117/71 (09-23-21 @ 06:31) (93/64 - 121/79)  Lipid Panel: Date/Time: 08-13-21 @ 10:33  Cholesterol, Serum: 160  Direct LDL: --  HDL Cholesterol, Serum: 62  Total Cholesterol/HDL Ration Measurement: --  Triglycerides, Serum: 50  
BMI: BMI (kg/m2): 21.4 (08-11-21 @ 16:58)  HbA1c: A1C with Estimated Average Glucose Result: 5.3 % (08-13-21 @ 10:33)    Glucose:   BP: 96/70 (09-05-21 @ 06:42) (96/70 - 118/70)  Lipid Panel: Date/Time: 08-13-21 @ 10:33  Cholesterol, Serum: 160  Direct LDL: --  HDL Cholesterol, Serum: 62  Total Cholesterol/HDL Ration Measurement: --  Triglycerides, Serum: 50  
BMI: BMI (kg/m2): 21.4 (08-11-21 @ 16:58)  HbA1c: A1C with Estimated Average Glucose Result: 5.3 % (08-13-21 @ 10:33)    Glucose:   BP: 118/70 (09-03-21 @ 07:53) (118/70 - 135/87)  Lipid Panel: Date/Time: 08-13-21 @ 10:33  Cholesterol, Serum: 160  Direct LDL: --  HDL Cholesterol, Serum: 62  Total Cholesterol/HDL Ration Measurement: --  Triglycerides, Serum: 50  
BMI: BMI (kg/m2): 21.4 (08-11-21 @ 16:58)  HbA1c: A1C with Estimated Average Glucose Result: 5.3 % (08-13-21 @ 10:33)    Glucose:   BP: 98/72 (08-19-21 @ 07:59) (95/60 - 98/72)  Lipid Panel: Date/Time: 08-13-21 @ 10:33  Cholesterol, Serum: 160  Direct LDL: --  HDL Cholesterol, Serum: 62  Total Cholesterol/HDL Ration Measurement: --  Triglycerides, Serum: 50  
BMI: BMI (kg/m2): 21.4 (08-11-21 @ 16:58)  HbA1c: A1C with Estimated Average Glucose Result: 5.3 % (08-13-21 @ 10:33)    Glucose:   BP: 126/86 (08-25-21 @ 07:46) (124/79 - 126/86)  Lipid Panel: Date/Time: 08-13-21 @ 10:33  Cholesterol, Serum: 160  Direct LDL: --  HDL Cholesterol, Serum: 62  Total Cholesterol/HDL Ration Measurement: --  Triglycerides, Serum: 50  
BMI: BMI (kg/m2): 21.4 (08-11-21 @ 16:58)  HbA1c: A1C with Estimated Average Glucose Result: 5.3 % (08-13-21 @ 10:33)    Glucose:   BP: 107/78 (08-13-21 @ 08:01) (107/78 - 136/84)  Lipid Panel: Date/Time: 08-13-21 @ 10:33  Cholesterol, Serum: 160  Direct LDL: --  HDL Cholesterol, Serum: 62  Total Cholesterol/HDL Ration Measurement: --  Triglycerides, Serum: 50  
BMI: BMI (kg/m2): 21.4 (08-11-21 @ 16:58)  HbA1c: A1C with Estimated Average Glucose Result: 5.3 % (08-13-21 @ 10:33)    Glucose:   BP: 105/72 (08-26-21 @ 07:32) (105/72 - 126/86)  Lipid Panel: Date/Time: 08-13-21 @ 10:33  Cholesterol, Serum: 160  Direct LDL: --  HDL Cholesterol, Serum: 62  Total Cholesterol/HDL Ration Measurement: --  Triglycerides, Serum: 50  
BMI: BMI (kg/m2): 21.4 (08-11-21 @ 16:58)  HbA1c: A1C with Estimated Average Glucose Result: 5.3 % (08-13-21 @ 10:33)    Glucose:   BP: 118/68 (09-08-21 @ 08:05) (112/80 - 123/85)  Lipid Panel: Date/Time: 08-13-21 @ 10:33  Cholesterol, Serum: 160  Direct LDL: --  HDL Cholesterol, Serum: 62  Total Cholesterol/HDL Ration Measurement: --  Triglycerides, Serum: 50  
BMI: BMI (kg/m2): 21.4 (08-11-21 @ 16:58)  HbA1c: A1C with Estimated Average Glucose Result: 5.3 % (08-13-21 @ 10:33)    Glucose:   BP: 121/76 (09-01-21 @ 06:15) (107/70 - 135/87)  Lipid Panel: Date/Time: 08-13-21 @ 10:33  Cholesterol, Serum: 160  Direct LDL: --  HDL Cholesterol, Serum: 62  Total Cholesterol/HDL Ration Measurement: --  Triglycerides, Serum: 50  
BMI: BMI (kg/m2): 21.4 (08-11-21 @ 16:58)  HbA1c: A1C with Estimated Average Glucose Result: 5.3 % (08-13-21 @ 10:33)    Glucose:   BP: 107/78 (08-13-21 @ 08:01) (107/78 - 136/84)  Lipid Panel: Date/Time: 08-13-21 @ 10:33  Cholesterol, Serum: 160  Direct LDL: --  HDL Cholesterol, Serum: 62  Total Cholesterol/HDL Ration Measurement: --  Triglycerides, Serum: 50

## 2021-09-28 NOTE — BH PSYCHOLOGY - GROUP THERAPY NOTE - NSBHPSYCHOLRESPONSE_PSY_A_CORE
Accepted support

## 2021-09-28 NOTE — BH INPATIENT PSYCHIATRY PROGRESS NOTE - NSTXANXINTERMD_PSY_ALL_CORE
Titrate lorazepam to treat catatonia  Titrate bupropion to treat depression with psychmotor slowing
Continue ECT, taper lorazepam
Titrate lorazepam to treat catatonia  Titrate aripiprazole
Titrate lorazepam to treat catatonia  Titrate bupropion to treat depression with psychmotor slowing
Titrate lorazepam to treat catatonia  Titrate bupropion to treat depression with psychmotor slowing
Titrate lorazepam to treat catatonia  Titrate aripiprazole
Titrate lorazepam to treat catatonia
Titrate lorazepam to treat catatonia  Titrate aripiprazole
Continue ECT, taper lorazepam
Titrate lorazepam to treat catatonia  Titrate aripiprazole
Titrate lorazepam to treat catatonia
Titrate lorazepam to treat catatonia  Titrate bupropion to treat depression with psychmotor slowing
Continue ECT
Titrate lorazepam to treat catatonia  Titrate aripiprazole
Titrate lorazepam to treat catatonia  Titrate aripiprazole
Titrate lorazepam to treat catatonia
Titrate lorazepam to treat catatonia  Titrate aripiprazole
Continue ECT, taper lorazepam
Titrate lorazepam to treat catatonia  Titrate bupropion to treat depression with psychmotor slowing
Titrate lorazepam to treat catatonia  Titrate aripiprazole
Titrate lorazepam to treat catatonia
Continue ECT
Titrate lorazepam to treat catatonia  Titrate aripiprazole
Titrate lorazepam to treat catatonia  Titrate bupropion to treat depression with psychmotor slowing
Titrate lorazepam to treat catatonia  Titrate aripiprazole

## 2021-09-28 NOTE — BH INPATIENT PSYCHIATRY PROGRESS NOTE - NSBHANTIPSYCHOTIC_PSY_ALL_CORE
Yes...
No
Yes...
No
Yes...
No
No
Yes...
No
Yes...
No
Yes...
No
Yes...
No
Yes...
No
Yes...

## 2021-09-28 NOTE — BH INPATIENT PSYCHIATRY PROGRESS NOTE - TELEPSYCHIATRY?
No

## 2021-09-28 NOTE — BH INPATIENT PSYCHIATRY PROGRESS NOTE - NSBHMSEIMPULSE_PSY_A_CORE
Normal
Impaired
Impaired
Normal
Impaired
Normal
Normal
Impaired
Normal
Impaired
Impaired
Normal
Impaired
Impaired
Normal
Impaired
Normal
Impaired
Impaired
Normal
Impaired
Impaired
Normal

## 2021-09-28 NOTE — BH INPATIENT PSYCHIATRY PROGRESS NOTE - NSBHMSEMUSCLE_PSY_A_CORE
Normal muscle tone/strength
Other
Other
Normal muscle tone/strength
Other
Other
Normal muscle tone/strength
Normal muscle tone/strength
Other
Normal muscle tone/strength
Normal muscle tone/strength
Other
Normal muscle tone/strength
Other
Other
Normal muscle tone/strength
Other
Other
Normal muscle tone/strength
Normal muscle tone/strength
Other
Normal muscle tone/strength
Other
Normal muscle tone/strength
Normal muscle tone/strength
Other
Normal muscle tone/strength

## 2021-09-28 NOTE — BH INPATIENT PSYCHIATRY PROGRESS NOTE - NSDCCRITERIA_PSY_ALL_CORE
Improvement in depression and self-care
Improvement in self-care, suicidality
Improvement in depression and self-care

## 2021-09-28 NOTE — BH INPATIENT PSYCHIATRY PROGRESS NOTE - NSBHMSEBODY_PSY_A_CORE
Underweight
Malnourished/Underweight
Malnourished/Underweight
Average build
Average build
Malnourished/Underweight
Underweight
Malnourished/Underweight
Malnourished/Underweight
Underweight
Malnourished/Underweight
Underweight
Average build
Malnourished
Underweight
Average build
Underweight
Malnourished/Underweight
Average build
Underweight
Underweight
Malnourished/Underweight
Underweight
Malnourished/Underweight
Malnourished
Malnourished/Underweight
Underweight
Malnourished/Underweight

## 2021-09-28 NOTE — BH INPATIENT PSYCHIATRY PROGRESS NOTE - NSBHMSERECMEM_PSY_A_CORE
Unable to assess
Normal
Unable to assess
Normal
Unable to assess
Normal
Unable to assess
Normal
Normal
Unable to assess
Normal
Unable to assess
Normal
Unable to assess
Normal
Unable to assess
Normal
Unable to assess

## 2021-09-28 NOTE — BH PSYCHOLOGY - GROUP THERAPY NOTE - NSBHPTASSESSDT_PSY_A_CORE
24-Sep-2021 11:00
28-Sep-2021 11:15
21-Sep-2021 11:00
16-Sep-2021 15:44
03-Sep-2021 11:15
13-Sep-2021 11:15
19-Aug-2021 11:15
24-Aug-2021 11:00
22-Sep-2021 09:15
23-Sep-2021 14:48

## 2021-09-28 NOTE — BH INPATIENT PSYCHIATRY PROGRESS NOTE - NSTXPROBDEPRES_PSY_ALL_CORE
DEPRESSIVE SYMPTOMS

## 2021-09-28 NOTE — BH INPATIENT PSYCHIATRY PROGRESS NOTE - NSTXSUICIDINTERMD_PSY_ALL_CORE
Titrate bupropion to treat depression with psychmotor slowing
Titrate aripiprazole
Titrate antidepressants
Titrate antidepressants
Titrate bupropion to treat depression with psychmotor slowing
Titrate aripiprazole
Titrate antidepressants
Titrate aripiprazole
Titrate aripiprazole
Titrate bupropion to treat depression with psychmotor slowing
Titrate antidepressants
Titrate aripiprazole
Titrate antidepressants
Titrate bupropion to treat depression with psychmotor slowing
Titrate antidepressants
Titrate aripiprazole
Titrate antidepressants
Titrate antidepressants
Titrate aripiprazole
Titrate aripiprazole
Titrate antidepressants
Titrate bupropion to treat depression with psychmotor slowing
Titrate aripiprazole
Titrate antidepressants
Titrate aripiprazole
Titrate bupropion to treat depression with psychmotor slowing

## 2021-09-28 NOTE — BH INPATIENT PSYCHIATRY PROGRESS NOTE - NSBHMSEMOVE_PSY_A_CORE
No abnormal movements

## 2021-09-28 NOTE — BH INPATIENT PSYCHIATRY PROGRESS NOTE - NSTXDCOPLKGOAL_PSY_ALL_CORE
Will agree to consider an appropriate level of outpatient care

## 2021-09-28 NOTE — BH PSYCHOLOGY - GROUP THERAPY NOTE - NSPSYCHOLGRPBILLING_PSY_A_CORE
[id number] - Group Psychotherapy
[id number] - Group Psychotherapy
[id number] - Group Psychotherapy
[id number] - Group Psychotherapy
[id number] - Group Psychotherapy
[id number] - Group Psychotherapy
[id number] - Group Psychotherapy
[id number] - Group Psychotherapy
[id number] - Group Psychotherapy

## 2021-09-28 NOTE — BH INPATIENT PSYCHIATRY PROGRESS NOTE - NSICDXBHPRIMARYDX_PSY_ALL_CORE
Catatonia   F06.1  
Severe episode of recurrent major depressive disorder, with psychotic features   F33.3  
Catatonia   F06.1  
Severe episode of recurrent major depressive disorder, with psychotic features   F33.3  
Catatonia   F06.1  
Severe episode of recurrent major depressive disorder, with psychotic features   F33.3  
Severe episode of recurrent major depressive disorder, with psychotic features   F33.3  
Catatonia   F06.1  
Catatonia   F06.1  
Severe episode of recurrent major depressive disorder, with psychotic features   F33.3  
Severe episode of recurrent major depressive disorder, with psychotic features   F33.3  
Catatonia   F06.1  
Severe episode of recurrent major depressive disorder, with psychotic features   F33.3  
Severe episode of recurrent major depressive disorder, with psychotic features   F33.3  
Catatonia   F06.1  
Severe episode of recurrent major depressive disorder, with psychotic features   F33.3  
Severe episode of recurrent major depressive disorder, with psychotic features   F33.3  
Catatonia   F06.1  
Severe episode of recurrent major depressive disorder, with psychotic features   F33.3  
Catatonia   F06.1

## 2021-09-28 NOTE — BH PSYCHOLOGY - GROUP THERAPY NOTE - NSPSYCHOLGRPDBTPT_PSY_A_CORE
Patient unable to identify mood states/Patient unable to participate due to clinical symptoms/other...
Patient unable to identify mood states/Patient unable to participate due to clinical symptoms/other...
stable mood and affect in group/no self-destructive impulses/behaviors/other...
Patient unable to identify mood states/Patient unable to participate due to clinical symptoms/other...
stable mood and affect in group/no self-destructive impulses/behaviors/other...

## 2021-09-28 NOTE — BH PSYCHOLOGY - GROUP THERAPY NOTE - NSBHPSYCHOLASSESSPROV_PSY_A_CORE
Licensed Staff Psychologist
Licensed Staff Psychologist
Trainee Only
Licensed Staff Psychologist

## 2021-09-28 NOTE — BH INPATIENT PSYCHIATRY PROGRESS NOTE - NSBHMSERELATED_PSY_A_CORE
Poor
Poor
Fair
Poor
Good
Poor
Poor
Fair
Poor
Poor
Good
Poor
Good
Poor
Fair
Poor
Good
Poor
Poor
Fair
Fair
Poor
Poor
Good
Poor
Fair
Good

## 2021-09-28 NOTE — BH INPATIENT PSYCHIATRY PROGRESS NOTE - NSBHMSETHTPROC_PSY_A_CORE
Linear
Unable to assess
Thought blocking
Unable to assess
Linear
Unable to assess
Linear
Unable to assess
Thought blocking
Linear
Unable to assess
Unable to assess
Linear
Linear
Unable to assess
Linear
Unable to assess
Linear
Unable to assess
Unable to assess
Linear
Unable to assess

## 2021-09-28 NOTE — BH INPATIENT PSYCHIATRY PROGRESS NOTE - NSBHFUPMEDSE_PSY_A_CORE
None known

## 2021-09-28 NOTE — BH INPATIENT PSYCHIATRY PROGRESS NOTE - NSBHFUPINTERVALHXFT_PSY_A_CORE
Chart reviewed, including vital signs, medication administration record, lab results, and nursing notes.   Case discussed with nursing, psychology, psych rehab, and social work in team meeting.     Patient is seen in the treatment room, in no acute distress, amenable to interview. She exhibits a bright affect and jokes with the treatment team. She denies any safety concerns and agrees with the plan for discharge today. Reports stable sleep and appetite. Denies SI/HI/AVH. We confirmed the next ECT treatment for the following Tuesday.    Called father Dr. Knox at [phone]:  Father denies any concerns with the plan for discharge and is able to contract for safety. Denies any guns/lethal weapons are present in the home. Agrees with the plan to follow-up with the outpatient provider for further medication management. Discussed safety planning and to call the outpatient psychiatrist and/or [phone] if the patient expresses any danger to self or others in the future.

## 2021-09-28 NOTE — BH PSYCHOLOGY - GROUP THERAPY NOTE - NSPSYCHOLGRPDBTINT_PSY_A_CORE
other...
other...
review emotion regulation homework
other...

## 2021-09-28 NOTE — BH INPATIENT PSYCHIATRY PROGRESS NOTE - NSTXANXPROGRES_PSY_ALL_CORE
Improving
No Change
Improving
No Change
Improving
No Change
No Change
Improving
No Change
Improving
Improving

## 2021-09-28 NOTE — BH PSYCHOLOGY - GROUP THERAPY NOTE - NSPSYCHOLGRPDBTPT_PSY_A_CORE FT
DBT Group is a group in which patients learn skills to better manage their emotions and behaviors. Group begins with a mindfulness practice so that patients have an opportunity to practice observing their internal and external experiences.  Following the mindfulness exercise the group learns new skills in a didactic format. Group today focused on the “emotion regulation” module.  Specifically, patients learned the skills of “check the facts,” and “opposite action.” “Check the facts” is about being more realistic about interpretations and assumptions and challenging them appropriately to think more rationally, and “opposite action” involves acting opposite to problematic urges that come with emotions.  provided an example of checking the facts, and patients were encouraged to think about how these skills, and were assigned homework to practice. 
DBT Group is a group in which patients learn skills to better manage their emotions and behaviors. Group begins with a mindfulness practice so that patients have an opportunity to practice observing their internal and external experiences.  Following the mindfulness exercise the group learns new skills in a didactic format. Group today focused on the “distress tolerance” module, which are skills to help patients manage their crisis urges.  Specifically, pts learned the skill of “radical acceptance,” which includes accepting painful situations in their lives they cannot change through turning the mind and practicing willingness. Patients were asked to determine difficult situations in their lives they needed to work on accepting, and provided examples of ways to practice this while in the hospital. 
DBT Group is a group in which patients learn skills to better manage their emotions and behaviors. Group begins with a mindfulness practice so that patients have an opportunity to practice observing their internal and external experiences.  Following the mindfulness exercise the group learns new skills in a didactic format. Group today focused on the “emotion regulation” module.  Specifically, patients learned about the skill of Problem Solving, which is a method of managing difficult situations when an emotion is justified by the situation.  Patients were taught the seven steps of problem solving and provided with an example of a situation in which the skill would be useful.  Patients were also asked to think about when the skill would be helpful in their lives and how to apply the steps. Patients were also provided with a worksheet in order to help them practice the skill.
DBT Group is a group in which patients learn skills to better manage their emotions and behaviors. Group begins with a mindfulness practice so that patients have an opportunity to practice observing their internal and external experiences.  Following the mindfulness exercise the group learns new skills in a didactic format. Group today focused on the “emotion regulation” module.  Specifically, patients learned about the Model for Describing Emotions, which explains the different components of an emotion, including prompting event, vulnerability factors, interpretations, biological changes and actions. Patients were also taught various techniques for changing the trajectory of an emotion through mindful awareness, problem solving, check the facts and opposite action.  
DBT Group is a group in which patients learn skills to better manage their emotions and behaviors. Group today focused on the “mindfulness” module, which are skills to help patients increase their awareness of their present experience without judgment. Pts were taught the “what” skills (observe, describe, participate) and “how” skills (non-judgmentally, effectively, and one-mindfully) of mindfulness, and engaged in a variety of mindfulness exercises to practice the skills, and shared observations at the end of each activity. 
DBT Group is a group in which patients learn skills to better manage their emotions and behaviors. Group begins with a mindfulness practice so that patients have an opportunity to practice observing their internal and external experiences.  Following the mindfulness exercise the group learns new skills in a didactic format. Group today focused on the “emotion regulation” module.  Specifically, patients learned the skill of Accumulating Positive Emotions in the Long Term by identifying values and translating those into goals and manageable action steps. Patients shared values that are important to them and how these translate into goals, as well as how they’ve begun to implement these.
DBT skills generalization group is a group in which patients review the skill taught the day before, and patients have the opportunity to troubleshoot the skill, engage in more practice, and share their experience using the skill. Today’s skills review group focused on the skill of Accumulating Positive Emotions in the Long Term by identifying values and translating those into goals and manageable action steps. Patients shared values that are important to them and how these translate into goals, as well as how they’ve begun to implement these.
DBT Group is a group in which patients learn skills to better manage their emotions and behaviors. Group begins with a mindfulness practice so that patients have an opportunity to practice observing their internal and external experiences.  Following the mindfulness exercise the group learns new skills in a didactic format. Group today focused on the “emotion regulation” module.  Specifically, patients learned the skills of “PLEASE,” or taking care of the mind by taking care of the body. This skill involves maintaining consistent treatment for physical illness, balanced eating, avoidance of mood-altering substances, balanced sleep, and exercise.  provided examples and suggestions of ways to improve these areas, and patients were encouraged to engage in discussion of ways they can prioritize and implement this skill.
DBT Group is a group in which patients learn skills to better manage their emotions and behaviors. Group today focused on the “mindfulness” module, which are skills to help patients increase their awareness of their present experience without judgment. Pts were taught the “what” skills (observe, describe, participate) and “how” skills (non-judgmentally, effectively, and one-mindfully) of mindfulness, and engaged in a variety of mindfulness exercises to practice the skills, and shared observations at the end of each activity. 
DBT Group is a group in which patients learn skills to better manage their emotions and behaviors. Group begins with a mindfulness practice so that patients have an opportunity to practice observing their internal and external experiences.  Following the mindfulness exercise the group learns new skills in a didactic format. Group today focused on the “emotion regulation” module.  Specifically, patients learned Build Mastery and [place] Ahead and [place] Ahead. Patients were asked to identify an activity to engage in every day that would help increase their sense of competence and accomplishment (Build Mastery). They were also asked to identify potential difficult situations, identify skills to help manage these difficulties, and imagine coping effectively ([place] Ahead).

## 2021-09-28 NOTE — BH INPATIENT PSYCHIATRY PROGRESS NOTE - NSBHMSEAFFRANGE_PSY_A_CORE
Constricted
Full
Constricted
Full
Constricted
Full
Other
Full
Constricted
Constricted
Full
Constricted
Blunted
Full
Full
Constricted

## 2021-09-28 NOTE — BH INPATIENT PSYCHIATRY PROGRESS NOTE - NSBHMETABOLICLABS_PSY_ALL_CORE
Labs within last 12 months
All labs not within last 12 months, ordered
Labs within last 12 months

## 2021-09-28 NOTE — BH INPATIENT PSYCHIATRY PROGRESS NOTE - NSBHMSEPERCEPT_PSY_A_CORE
Unable to assess
No abnormalities
Unable to assess
Unable to assess
No abnormalities
Unable to assess
No abnormalities
Unable to assess
No abnormalities
Unable to assess
No abnormalities
No abnormalities
Unable to assess
No abnormalities
Unable to assess
Unable to assess
No abnormalities
No abnormalities
Unable to assess

## 2021-09-28 NOTE — BH INPATIENT PSYCHIATRY PROGRESS NOTE - NSTXDEPRESDATETRGT_PSY_ALL_CORE
02-Sep-2021
29-Sep-2021
19-Aug-2021
23-Sep-2021
02-Sep-2021
02-Sep-2021
29-Sep-2021
02-Sep-2021
19-Aug-2021
29-Sep-2021
19-Aug-2021
26-Aug-2021
19-Aug-2021
02-Sep-2021
23-Sep-2021
23-Sep-2021
02-Sep-2021
02-Sep-2021
26-Aug-2021
26-Aug-2021
02-Sep-2021
19-Aug-2021
26-Aug-2021
19-Aug-2021
02-Sep-2021
02-Sep-2021
19-Aug-2021
02-Sep-2021
02-Sep-2021
29-Sep-2021
02-Sep-2021
23-Sep-2021
26-Aug-2021
02-Sep-2021
23-Sep-2021
02-Sep-2021

## 2021-09-28 NOTE — BH PSYCHOLOGY - GROUP THERAPY NOTE - NSPSYCHOLGRPDBTEMOT_PSY_A_CORE FT
na
Build Mastery / [place] Ahead 
Opposite Action 
Accumulating Positives: Long Term 
please
Problem Solving 
na
Accumulating Positives: Long Term 
taught Emotion Regulation skill of Model for Describing Emotions
na

## 2021-09-28 NOTE — BH INPATIENT PSYCHIATRY PROGRESS NOTE - NSBHMSEATTEN_PSY_A_CORE
Unable to assess
Unable to assess
Normal
Unable to assess
Normal
Unable to assess
Normal
Unable to assess
Unable to assess
Normal
Unable to assess
Normal
Normal
Unable to assess
Normal
Normal
Unable to assess

## 2021-09-28 NOTE — BH INPATIENT PSYCHIATRY PROGRESS NOTE - NSTXDEPRESPROGRES_PSY_ALL_CORE
No Change
Improving
No Change
Improving
No Change
Improving
No Change
Improving
No Change

## 2021-09-28 NOTE — BH INPATIENT PSYCHIATRY PROGRESS NOTE - NSBHPROGSUIC_PSY_A_CORE
no

## 2021-09-28 NOTE — BH INPATIENT PSYCHIATRY PROGRESS NOTE - NSTXANXDATEEST_PSY_ALL_CORE
12-Aug-2021
16-Sep-2021
12-Aug-2021
12-Aug-2021
16-Sep-2021
12-Aug-2021
16-Sep-2021
12-Aug-2021
02-Sep-2021
12-Aug-2021
16-Sep-2021
12-Aug-2021
16-Sep-2021
12-Aug-2021

## 2021-09-28 NOTE — BH INPATIENT PSYCHIATRY PROGRESS NOTE - NSBHMSETHTASSOC_PSY_A_CORE
Unable to assess
Unable to assess
Normal
Unable to assess
Normal
Unable to assess
Normal
Normal
Unable to assess
Normal
Normal
Unable to assess
Normal
Normal
Unable to assess
Unable to assess
Normal
Unable to assess

## 2021-09-28 NOTE — BH INPATIENT PSYCHIATRY PROGRESS NOTE - NSTXPROBDCOPLK_PSY_ALL_CORE
DISCHARGE ISSUE - LACK OF APPROPRIATE OUTPATIENT SERVICES

## 2021-09-28 NOTE — BH INPATIENT PSYCHIATRY PROGRESS NOTE - NSBHINPTBILLING_PSY_ALL_CORE
39859 - Inpatient Low Complexity
23618 - Inpatient Moderate Complexity
43239 - Inpatient High Complexity
11546 - Inpatient Moderate Complexity
49073 - Inpatient Moderate Complexity
32973 - Inpatient Moderate Complexity
74857 - Initial hospital care - high complexity
27092 - Inpatient High Complexity
62156 - Inpatient Moderate Complexity
85582 - Inpatient Moderate Complexity
13548 - Initial hospital care - high complexity
67613 - Inpatient Moderate Complexity
58560 - Inpatient Low Complexity
99030 - Inpatient Low Complexity
53429 - Inpatient Low Complexity
60909 - Inpatient Moderate Complexity
39751 - Inpatient Low Complexity
28000 - Inpatient Moderate Complexity
48174 - Inpatient Moderate Complexity
56768 - Inpatient Moderate Complexity
99665 - Inpatient Moderate Complexity
68342 - Inpatient Moderate Complexity
20243 - Inpatient Moderate Complexity
65974 - Inpatient Moderate Complexity
54208 - Inpatient Moderate Complexity
69668 - Inpatient Moderate Complexity
83648 - Inpatient Moderate Complexity
79281 - Inpatient Moderate Complexity
79850 - Inpatient High Complexity
21414 - Inpatient High Complexity
43137 - Inpatient Moderate Complexity
37999 - Inpatient Moderate Complexity
34880 - Inpatient Moderate Complexity
32290 - Inpatient Low Complexity
16124 - Inpatient Moderate Complexity
65202 - Inpatient Low Complexity

## 2021-09-28 NOTE — BH INPATIENT PSYCHIATRY PROGRESS NOTE - NSTXSUICIDDATEEST_PSY_ALL_CORE
02-Sep-2021
26-Aug-2021
02-Sep-2021
12-Aug-2021
26-Aug-2021
12-Aug-2021
02-Sep-2021
12-Aug-2021
26-Aug-2021
12-Aug-2021
02-Sep-2021
22-Sep-2021
22-Sep-2021
02-Sep-2021
12-Aug-2021
22-Sep-2021
02-Sep-2021
12-Aug-2021
02-Sep-2021
02-Sep-2021
12-Aug-2021
26-Aug-2021
12-Aug-2021
12-Aug-2021
26-Aug-2021
12-Aug-2021
22-Sep-2021
12-Aug-2021
02-Sep-2021
12-Aug-2021
02-Sep-2021

## 2021-09-28 NOTE — BH INPATIENT PSYCHIATRY PROGRESS NOTE - NS ED [facility] MED ROS PSYCHIATRIC
See HPI

## 2021-09-28 NOTE — BH INPATIENT PSYCHIATRY PROGRESS NOTE - NSTXSUICIDDATETRGT_PSY_ALL_CORE
09-Sep-2021
02-Sep-2021
19-Aug-2021
19-Aug-2021
02-Sep-2021
02-Sep-2021
09-Sep-2021
19-Aug-2021
09-Sep-2021
09-Sep-2021
29-Sep-2021
09-Sep-2021
29-Sep-2021
02-Sep-2021
29-Sep-2021
19-Aug-2021
09-Sep-2021
09-Sep-2021
19-Aug-2021
09-Sep-2021
09-Sep-2021
26-Aug-2021
26-Aug-2021
19-Aug-2021
29-Sep-2021
09-Sep-2021
26-Aug-2021
09-Sep-2021
26-Aug-2021
09-Sep-2021
19-Aug-2021
26-Aug-2021
09-Sep-2021
02-Sep-2021

## 2021-09-28 NOTE — BH INPATIENT PSYCHIATRY PROGRESS NOTE - NSBHPSYCHOLCOGORIENT_PSY_A_CORE
Oriented to time, place, person, situation
Unable to assess
Oriented to time, place, person, situation
Unable to assess
Oriented to time, place, person, situation
Oriented to time, place, person, situation
Unable to assess
Oriented to time, place, person, situation
Unable to assess
Oriented to time, place, person, situation
Oriented to time, place, person, situation
Unable to assess
Oriented to time, place, person, situation
Unable to assess
Unable to assess
Oriented to time, place, person, situation
Unable to assess
Oriented to time, place, person, situation
Oriented to time, place, person, situation
Unable to assess
Unable to assess
Oriented to time, place, person, situation
Unable to assess

## 2021-09-28 NOTE — BH INPATIENT PSYCHIATRY PROGRESS NOTE - NSBHMSEAFFQUAL_PSY_A_CORE
Depressed/Other
Other
Other
Depressed
Other
Depressed
Depressed
Other
Depressed
Depressed/Other
Other
Depressed/Other
Depressed
Other
Depressed
Depressed
Depressed/Other
Depressed
Other
Depressed/Other
Depressed
Other
Other
Depressed
Other

## 2021-09-28 NOTE — BH PSYCHOLOGY - GROUP THERAPY NOTE - NSBHPSYCHOLPARTICIP_PSY_A_CORE
Non-participatory
Non-participatory
Partially engaged
Non-participatory
Fully engaged
Fully engaged
Non-participatory
Non-participatory
Fully engaged
Partially engaged

## 2021-09-28 NOTE — BH INPATIENT PSYCHIATRY PROGRESS NOTE - NSTXPROBSUICID_PSY_ALL_CORE
SUICIDE/SELF-INJURIOUS BEHAVIOR

## 2021-09-28 NOTE — BH INPATIENT PSYCHIATRY PROGRESS NOTE - NSBHROSSYSTEMS_PSY_ALL_CORE
Psychiatric

## 2021-09-28 NOTE — BH INPATIENT PSYCHIATRY PROGRESS NOTE - NSBHMSEINTELL_PSY_A_CORE
Unable to assess
Average
Unable to assess
Average
Unable to assess
Unable to assess
Average
Unable to assess
Average
Unable to assess
Unable to assess
Average
Unable to assess
Unable to assess
Average
Unable to assess
Average
Unable to assess
Average
Unable to assess

## 2021-09-28 NOTE — BH INPATIENT PSYCHIATRY PROGRESS NOTE - NSBHFUPINTERVALCCFT_PSY_A_CORE
Patient seen for follow up for depression and psychosis.
Catatonia
Patient seen for follow up for depression and psychosis.
Patient seen for follow up for depression and psychosis.
"Doing well"
None
"Doing well"
Patient seen for follow up for catatonia.
Patient seen for follow up for depression and psychosis.
Catatonia
Catatonia
None
Patient seen for follow up for depression and psychosis.
Patient seen for follow up for catatonia.
Patient seen for follow up for depression and psychosis.
Patient seen for follow up for catatonia.
Catatonia
"Doing well"
"Doing well"
Patient seen for follow up for depression and psychosis.
Catatonia
Patient seen for follow up for catatonia.
Patient seen for follow up for depression and psychosis.
"Doing well"
"Doing well"
Patient seen for follow up for catatonia.
Catatonia

## 2021-09-28 NOTE — BH INPATIENT PSYCHIATRY PROGRESS NOTE - NSTXDEPRESDATEEST_PSY_ALL_CORE
22-Sep-2021
26-Aug-2021
19-Aug-2021
26-Aug-2021
19-Aug-2021
12-Aug-2021
26-Aug-2021
26-Aug-2021
22-Sep-2021
26-Aug-2021
19-Aug-2021
22-Sep-2021
12-Aug-2021
12-Aug-2021
16-Sep-2021
26-Aug-2021
16-Sep-2021
19-Aug-2021
26-Aug-2021
26-Aug-2021
12-Aug-2021
26-Aug-2021
12-Aug-2021
16-Sep-2021
26-Aug-2021
16-Sep-2021
12-Aug-2021
22-Sep-2021
12-Aug-2021
26-Aug-2021
16-Sep-2021
26-Aug-2021
19-Aug-2021

## 2021-09-28 NOTE — BH INPATIENT PSYCHIATRY PROGRESS NOTE - NSBHLEGALSTATUSCHANGE_PSY_ALL_CORE
No
Yes...
No
Yes...
No
No
Yes...
No
Yes...
No
Yes...
No
Yes...
No
Yes...
No
Yes...
No
Yes...
No

## 2021-09-28 NOTE — BH INPATIENT PSYCHIATRY PROGRESS NOTE - NSCGISEVERILLNESS_PSY_ALL_CORE
5 = Markedly ill - intrusive symptoms that distinctly impair social/occupational function or cause intrusive levels of distress
4 = Moderately ill – overt symptoms causing noticeable, but modest, functional impairment or distress; symptom level may warrant medication
4 = Moderately ill – overt symptoms causing noticeable, but modest, functional impairment or distress; symptom level may warrant medication
5 = Markedly ill - intrusive symptoms that distinctly impair social/occupational function or cause intrusive levels of distress
5 = Markedly ill - intrusive symptoms that distinctly impair social/occupational function or cause intrusive levels of distress
2 = Borderline mentally ill – subtle or suspected pathology
4 = Moderately ill – overt symptoms causing noticeable, but modest, functional impairment or distress; symptom level may warrant medication
5 = Markedly ill - intrusive symptoms that distinctly impair social/occupational function or cause intrusive levels of distress
3 = Mildly ill – clearly established symptoms with minimal, if any, distress or difficulty in social and occupational function
5 = Markedly ill - intrusive symptoms that distinctly impair social/occupational function or cause intrusive levels of distress
3 = Mildly ill – clearly established symptoms with minimal, if any, distress or difficulty in social and occupational function
5 = Markedly ill - intrusive symptoms that distinctly impair social/occupational function or cause intrusive levels of distress
3 = Mildly ill – clearly established symptoms with minimal, if any, distress or difficulty in social and occupational function
4 = Moderately ill – overt symptoms causing noticeable, but modest, functional impairment or distress; symptom level may warrant medication
4 = Moderately ill – overt symptoms causing noticeable, but modest, functional impairment or distress; symptom level may warrant medication
5 = Markedly ill - intrusive symptoms that distinctly impair social/occupational function or cause intrusive levels of distress
2 = Borderline mentally ill – subtle or suspected pathology
2 = Borderline mentally ill – subtle or suspected pathology
4 = Moderately ill – overt symptoms causing noticeable, but modest, functional impairment or distress; symptom level may warrant medication
5 = Markedly ill - intrusive symptoms that distinctly impair social/occupational function or cause intrusive levels of distress
4 = Moderately ill – overt symptoms causing noticeable, but modest, functional impairment or distress; symptom level may warrant medication
2 = Borderline mentally ill – subtle or suspected pathology
5 = Markedly ill - intrusive symptoms that distinctly impair social/occupational function or cause intrusive levels of distress
4 = Moderately ill – overt symptoms causing noticeable, but modest, functional impairment or distress; symptom level may warrant medication
5 = Markedly ill - intrusive symptoms that distinctly impair social/occupational function or cause intrusive levels of distress
2 = Borderline mentally ill – subtle or suspected pathology
5 = Markedly ill - intrusive symptoms that distinctly impair social/occupational function or cause intrusive levels of distress
5 = Markedly ill - intrusive symptoms that distinctly impair social/occupational function or cause intrusive levels of distress
4 = Moderately ill – overt symptoms causing noticeable, but modest, functional impairment or distress; symptom level may warrant medication
5 = Markedly ill - intrusive symptoms that distinctly impair social/occupational function or cause intrusive levels of distress

## 2021-09-28 NOTE — BH INPATIENT PSYCHIATRY PROGRESS NOTE - NSBHASSESSSUMMFT_PSY_ALL_CORE
Patient is a 26 year-old female with history of depression and polysubstance use (marijuana, Adderall), brought in by family for worsening anxiety and poor self-care. Patient is tearful and nonverbal. She is noted to be grossly underweight, and has some catatonic features (mutism, staring, poor PO intake) but this may be more related to psychomotor slowing.   PsyHx: Seen by outpatient provider Dr. Merrill.   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 However it was noted that she became overly sedated, and the dose of lorazepam was reduced. Patient minimally verbal but able to respond to questions nonverbally. Patient started on the antidepressant bupropion to target psychomotor slowing. Discussed plan with father and outpatient provider Dr. Merrill who agree with plan to start the antidepressant bupropion.   8/25-9/1:  Patient exhibiting worsening symptoms of catatonia, particularly mutism, immobility, and catalepsy, despite resuming previous dose of lorazepam. Will monitor catatonia with [place] Marty scale. Bupropion has been ineffective, so it was discontinued and replaced with aripiprazole. May consider augmentation with ECT at next stage, given her refractory symptoms.  9/2-9/14  Mild improvement in depressive symptoms since starting aripiprazole, but overall minimal improvement. Will pursue ECT at this time.   Obtained neurology consult to r/o autoimmune encephalitis; workup in plan  Pt remains catatonic.  Father postponed the first ECT treatment to Wednesday.  9/15  Patient responding well to ECT treatment and is improving, speaking in full sentences.   Discontinued lorazepam. Will continue ECT with plan for discharge next week to continue ECT as outpatient.  Decreased dose of Abilify due to increased feelings of anxiety  Patient tolerating ECT well and will continue as an outpatient weekly for ECT.    1. Admission status  9.27 (converted from 9.39)    2. Significant lab findings  UDS positive for THC    3. Psychotropic medications  - Escitalopram 10 mg daily (depression)  	- Home med  - Aripiprazole 5 mg daily (depression augmentation)  	- Started 9/1, inc 9/4, dec 9/22    Discontinued lorazepam 0.5 mg TID (catatonia)   	- Started 8/12, decreased 8/17, decreased 8/23, increased 8/25, increased 8/30, inc 9/7, dec 9/8, dec 9/15, dec 9/17, d/c 9/20  Discontinued quetiapine - patient with adequate sleep  Discontinued Bupropion XL - minimal effect      4. 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  anti-cardiolipin, p-ANCA, c-ANCA, anti-TPO, anti-thyroglobulin, NICK, anti-ß2 microglobulin, anti-dsDNA, C3 level, and C4 level.    B) ECT  - Consent for bifrontal ECT obtained  - Treatment #6 on 9/27  - NPO and hold lorazepam on evenings and mornings before ECT  - Weekly treatments to continue as outpatient    5. Psychosocial interventions  A) Remaining in contact with father Dr. Knox

## 2021-09-28 NOTE — BH INPATIENT PSYCHIATRY PROGRESS NOTE - NSTXPROBANX_PSY_ALL_CORE
ANXIETY/PANIC/FEAR

## 2021-09-28 NOTE — BH INPATIENT PSYCHIATRY PROGRESS NOTE - NSTXSUICIDGOAL_PSY_ALL_CORE
Will verbalize a decrease in preoccupation with suicidal thoughts and / or intent to commit suicide to 2 on a 10-point scale
Will verbalize a decrease in preoccupation with suicidal thoughts and / or intent to commit suicide to 2 on a 10-point scale
Will express feelings associated with suicidal ideation
Will verbalize a decrease in preoccupation with suicidal thoughts and / or intent to commit suicide to 2 on a 10-point scale
Be able to state 3 reasons for living
Will verbalize a decrease in preoccupation with suicidal thoughts and / or intent to commit suicide to 2 on a 10-point scale
Be able to state 3 reasons for living
Will verbalize a decrease in preoccupation with suicidal thoughts and / or intent to commit suicide to 2 on a 10-point scale
Be able to state 3 reasons for living
Will express feelings associated with suicidal ideation
Will verbalize a decrease in preoccupation with suicidal thoughts and / or intent to commit suicide to 2 on a 10-point scale
Be able to state 3 reasons for living
Will express feelings associated with suicidal ideation
Will verbalize a decrease in preoccupation with suicidal thoughts and / or intent to commit suicide to 2 on a 10-point scale
Will express feelings associated with suicidal ideation
Will verbalize a decrease in preoccupation with suicidal thoughts and / or intent to commit suicide to 2 on a 10-point scale
Will express feelings associated with suicidal ideation
Be able to state 3 reasons for living
Will express feelings associated with suicidal ideation
Will verbalize a decrease in preoccupation with suicidal thoughts and / or intent to commit suicide to 2 on a 10-point scale
Will verbalize a decrease in preoccupation with suicidal thoughts and / or intent to commit suicide to 2 on a 10-point scale

## 2021-09-28 NOTE — BH INPATIENT PSYCHIATRY PROGRESS NOTE - NSBHMSEREMMEM_PSY_A_CORE
Unable to assess
Normal
Normal
Unable to assess
Unable to assess
Normal
Normal
Unable to assess
Normal
Unable to assess
Normal
Unable to assess
Normal
Normal
Unable to assess
Unable to assess
Normal

## 2021-09-28 NOTE — BH INPATIENT PSYCHIATRY PROGRESS NOTE - GENERAL APPEARANCE
No deformities present

## 2021-09-28 NOTE — BH INPATIENT PSYCHIATRY PROGRESS NOTE - NSTXSUICIDPROGRES_PSY_ALL_CORE
No Change
Improving
No Change
Improving
No Change
Improving
No Change
Improving
No Change

## 2021-09-28 NOTE — BH INPATIENT PSYCHIATRY PROGRESS NOTE - NSBHCONTPROVIDER_PSY_ALL_CORE
No...
Yes...
No...
Yes...
No...
Yes...

## 2021-09-28 NOTE — BH INPATIENT PSYCHIATRY PROGRESS NOTE - NSTXDCOPLKDATEEST_PSY_ALL_CORE
16-Sep-2021
09-Sep-2021
23-Sep-2021
26-Aug-2021
23-Sep-2021
26-Aug-2021
18-Aug-2021
18-Aug-2021
12-Aug-2021
12-Aug-2021
02-Sep-2021
16-Sep-2021
26-Aug-2021
16-Sep-2021
26-Aug-2021
16-Sep-2021
02-Sep-2021
26-Aug-2021
09-Sep-2021
23-Sep-2021
18-Aug-2021
09-Sep-2021
12-Aug-2021
18-Aug-2021
18-Aug-2021
12-Aug-2021
12-Aug-2021
16-Sep-2021
18-Aug-2021
18-Aug-2021
02-Sep-2021
23-Sep-2021
02-Sep-2021

## 2021-09-28 NOTE — BH INPATIENT PSYCHIATRY PROGRESS NOTE - NS ED [facility] REVIEW OF ED CHART VITAL SIGNS REVIEWED
Yes

## 2021-09-28 NOTE — BH INPATIENT PSYCHIATRY PROGRESS NOTE - NSBHMSEKNOW_PSY_A_CORE
Unable to assess
Normal
Unable to assess
Unable to assess
Normal
Unable to assess
Normal
Unable to assess
Normal
Unable to assess
Normal
Unable to assess
Normal
Normal
Unable to assess
Normal
Normal

## 2021-09-28 NOTE — BH INPATIENT PSYCHIATRY PROGRESS NOTE - NSTREATMENTCERTY_PSY_ALL_CORE

## 2021-09-28 NOTE — BH INPATIENT PSYCHIATRY PROGRESS NOTE - NSBHCONSULTIPREASON_PSY_A_CORE
danger to self; mental illness expected to respond to inpatient care

## 2021-09-28 NOTE — BH INPATIENT PSYCHIATRY PROGRESS NOTE - NSBHMSEGAIT_PSY_A_CORE
Normal gait / station
Normal gait / station
Abnormal gait / station
Abnormal gait / station
Normal gait / station
Normal gait / station
Abnormal gait / station
Normal gait / station
Normal gait / station
Unable to assess
Normal gait / station
Abnormal gait / station
Normal gait / station
Unable to assess
Normal gait / station
Unable to assess
Normal gait / station
Abnormal gait / station
Normal gait / station
Abnormal gait / station
Unable to assess
Abnormal gait / station
Normal gait / station
Unable to assess
Abnormal gait / station
Normal gait / station

## 2021-09-28 NOTE — BH INPATIENT PSYCHIATRY PROGRESS NOTE - NSTXANXGOAL_PSY_ALL_CORE
Be able to participate in activities despite lingering anxiety/panic

## 2021-09-28 NOTE — BH INPATIENT PSYCHIATRY PROGRESS NOTE - NSICDXBHSECONDARYDX_PSY_ALL_CORE
Amphetamine abuse   F15.10  Cannabis abuse   F12.10  Anxiety   F41.9  Major depressive disorder   F32.9  
Severe episode of recurrent major depressive disorder, with psychotic features   F33.3  Amphetamine abuse   F15.10  Cannabis abuse   F12.10  Anxiety   F41.9  
Severe episode of recurrent major depressive disorder, with psychotic features   F33.3  Amphetamine abuse   F15.10  Cannabis abuse   F12.10  Anxiety   F41.9  
Amphetamine abuse   F15.10  Cannabis abuse   F12.10  Anxiety   F41.9  Major depressive disorder   F32.9  
Severe episode of recurrent major depressive disorder, with psychotic features   F33.3  Amphetamine abuse   F15.10  Cannabis abuse   F12.10  Anxiety   F41.9  Major depressive disorder   F32.9  
Amphetamine abuse   F15.10  Cannabis abuse   F12.10  Anxiety   F41.9  Major depressive disorder   F32.9  
Severe episode of recurrent major depressive disorder, with psychotic features   F33.3  Amphetamine abuse   F15.10  Cannabis abuse   F12.10  Anxiety   F41.9  
Severe episode of recurrent major depressive disorder, with psychotic features   F33.3  Amphetamine abuse   F15.10  Cannabis abuse   F12.10  Anxiety   F41.9  Major depressive disorder   F32.9  
Severe episode of recurrent major depressive disorder, with psychotic features   F33.3  Amphetamine abuse   F15.10  Cannabis abuse   F12.10  Anxiety   F41.9  Major depressive disorder   F32.9  
Amphetamine abuse   F15.10  Cannabis abuse   F12.10  Anxiety   F41.9  Major depressive disorder   F32.9  
Severe episode of recurrent major depressive disorder, with psychotic features   F33.3  Amphetamine abuse   F15.10  Cannabis abuse   F12.10  Anxiety   F41.9  
Anxiety   F41.9  
Severe episode of recurrent major depressive disorder, with psychotic features   F33.3  Amphetamine abuse   F15.10  Cannabis abuse   F12.10  Anxiety   F41.9  Major depressive disorder   F32.9  
Amphetamine abuse   F15.10  Cannabis abuse   F12.10  Anxiety   F41.9  Major depressive disorder   F32.9  
Amphetamine abuse   F15.10  Cannabis abuse   F12.10  Anxiety   F41.9  Major depressive disorder   F32.9  
Severe episode of recurrent major depressive disorder, with psychotic features   F33.3  Amphetamine abuse   F15.10  Cannabis abuse   F12.10  Anxiety   F41.9  
Severe episode of recurrent major depressive disorder, with psychotic features   F33.3  Amphetamine abuse   F15.10  Cannabis abuse   F12.10  Anxiety   F41.9  
Severe episode of recurrent major depressive disorder, with psychotic features   F33.3  Amphetamine abuse   F15.10  Cannabis abuse   F12.10  Anxiety   F41.9  Major depressive disorder   F32.9  
Amphetamine abuse   F15.10  Cannabis abuse   F12.10  Anxiety   F41.9  Major depressive disorder   F32.9  
Severe episode of recurrent major depressive disorder, with psychotic features   F33.3  Amphetamine abuse   F15.10  Cannabis abuse   F12.10  Anxiety   F41.9  Major depressive disorder   F32.9  
Severe episode of recurrent major depressive disorder, with psychotic features   F33.3  Amphetamine abuse   F15.10  Cannabis abuse   F12.10  Anxiety   F41.9  Major depressive disorder   F32.9  
Severe episode of recurrent major depressive disorder, with psychotic features   F33.3  Amphetamine abuse   F15.10  Cannabis abuse   F12.10  Anxiety   F41.9  
Severe episode of recurrent major depressive disorder, with psychotic features   F33.3  Amphetamine abuse   F15.10  Cannabis abuse   F12.10  Anxiety   F41.9  Major depressive disorder   F32.9  
Severe episode of recurrent major depressive disorder, with psychotic features   F33.3  Amphetamine abuse   F15.10  Cannabis abuse   F12.10  Anxiety   F41.9  
Severe episode of recurrent major depressive disorder, with psychotic features   F33.3  Amphetamine abuse   F15.10  Cannabis abuse   F12.10  Anxiety   F41.9  Major depressive disorder   F32.9  
Amphetamine abuse   F15.10  Cannabis abuse   F12.10  Anxiety   F41.9  Major depressive disorder   F32.9  
Severe episode of recurrent major depressive disorder, with psychotic features   F33.3  Amphetamine abuse   F15.10  Cannabis abuse   F12.10  Anxiety   F41.9  Major depressive disorder   F32.9  
Severe episode of recurrent major depressive disorder, with psychotic features   F33.3  Amphetamine abuse   F15.10  Cannabis abuse   F12.10  Anxiety   F41.9  
Severe episode of recurrent major depressive disorder, with psychotic features   F33.3  Amphetamine abuse   F15.10  Cannabis abuse   F12.10  Anxiety   F41.9  Major depressive disorder   F32.9  
Severe episode of recurrent major depressive disorder, with psychotic features   F33.3  Amphetamine abuse   F15.10  Cannabis abuse   F12.10  Anxiety   F41.9  Major depressive disorder   F32.9

## 2021-09-28 NOTE — BH INPATIENT PSYCHIATRY PROGRESS NOTE - PRN MEDS
MEDICATIONS  (PRN):  aluminum hydroxide/magnesium hydroxide/simethicone Suspension 30 milliLiter(s) Oral every 6 hours PRN Dyspepsia  haloperidol     Tablet 2 milliGRAM(s) Oral every 6 hours PRN agitation  haloperidol    Injectable 5 milliGRAM(s) IntraMuscular once PRN severe agitation  hydrOXYzine hydrochloride 25 milliGRAM(s) Oral three times a day PRN Anxiety  
MEDICATIONS  (PRN):  haloperidol     Tablet 2 milliGRAM(s) Oral every 6 hours PRN agitation  haloperidol    Injectable 5 milliGRAM(s) IntraMuscular once PRN severe agitation  
MEDICATIONS  (PRN):  aluminum hydroxide/magnesium hydroxide/simethicone Suspension 30 milliLiter(s) Oral every 6 hours PRN Dyspepsia  haloperidol     Tablet 2 milliGRAM(s) Oral every 6 hours PRN agitation  haloperidol    Injectable 5 milliGRAM(s) IntraMuscular once PRN severe agitation  hydrOXYzine hydrochloride 25 milliGRAM(s) Oral three times a day PRN Anxiety  
MEDICATIONS  (PRN):  aluminum hydroxide/magnesium hydroxide/simethicone Suspension 30 milliLiter(s) Oral every 6 hours PRN Dyspepsia  haloperidol     Tablet 2 milliGRAM(s) Oral every 6 hours PRN agitation  haloperidol    Injectable 5 milliGRAM(s) IntraMuscular once PRN severe agitation  
MEDICATIONS  (PRN):  haloperidol     Tablet 2 milliGRAM(s) Oral every 6 hours PRN agitation  haloperidol    Injectable 5 milliGRAM(s) IntraMuscular once PRN severe agitation  
MEDICATIONS  (PRN):  aluminum hydroxide/magnesium hydroxide/simethicone Suspension 30 milliLiter(s) Oral every 6 hours PRN Dyspepsia  haloperidol     Tablet 2 milliGRAM(s) Oral every 6 hours PRN agitation  haloperidol    Injectable 5 milliGRAM(s) IntraMuscular once PRN severe agitation  hydrOXYzine hydrochloride 25 milliGRAM(s) Oral three times a day PRN Anxiety  
MEDICATIONS  (PRN):  haloperidol     Tablet 2 milliGRAM(s) Oral every 6 hours PRN agitation  haloperidol    Injectable 5 milliGRAM(s) IntraMuscular once PRN severe agitation  
MEDICATIONS  (PRN):  aluminum hydroxide/magnesium hydroxide/simethicone Suspension 30 milliLiter(s) Oral every 6 hours PRN Dyspepsia  haloperidol     Tablet 2 milliGRAM(s) Oral every 6 hours PRN agitation  haloperidol    Injectable 5 milliGRAM(s) IntraMuscular once PRN severe agitation  
MEDICATIONS  (PRN):  haloperidol     Tablet 2 milliGRAM(s) Oral every 6 hours PRN agitation  haloperidol    Injectable 5 milliGRAM(s) IntraMuscular once PRN severe agitation  
MEDICATIONS  (PRN):  aluminum hydroxide/magnesium hydroxide/simethicone Suspension 30 milliLiter(s) Oral every 6 hours PRN Dyspepsia  haloperidol     Tablet 2 milliGRAM(s) Oral every 6 hours PRN agitation  haloperidol    Injectable 5 milliGRAM(s) IntraMuscular once PRN severe agitation  
MEDICATIONS  (PRN):  haloperidol     Tablet 2 milliGRAM(s) Oral every 6 hours PRN agitation  haloperidol    Injectable 5 milliGRAM(s) IntraMuscular once PRN severe agitation  
MEDICATIONS  (PRN):  aluminum hydroxide/magnesium hydroxide/simethicone Suspension 30 milliLiter(s) Oral every 6 hours PRN Dyspepsia  haloperidol     Tablet 2 milliGRAM(s) Oral every 6 hours PRN agitation  haloperidol    Injectable 5 milliGRAM(s) IntraMuscular once PRN severe agitation  
MEDICATIONS  (PRN):  haloperidol     Tablet 2 milliGRAM(s) Oral every 6 hours PRN agitation  haloperidol    Injectable 5 milliGRAM(s) IntraMuscular once PRN severe agitation  
MEDICATIONS  (PRN):  aluminum hydroxide/magnesium hydroxide/simethicone Suspension 30 milliLiter(s) Oral every 6 hours PRN Dyspepsia  haloperidol     Tablet 2 milliGRAM(s) Oral every 6 hours PRN agitation  haloperidol    Injectable 5 milliGRAM(s) IntraMuscular once PRN severe agitation  hydrOXYzine hydrochloride 25 milliGRAM(s) Oral three times a day PRN Anxiety

## 2021-09-28 NOTE — BH PSYCHOLOGY - GROUP THERAPY NOTE - NSPSYCHOLGRPDBTINT_PSY_A_CORE FT
taught distress tolerance skill 
taught Emotion Regulation skill o
taught Emotion Regulation skill of Model for Describing Emotions
taught emotion regulation skill 
taught mindfulness skill 
taught emotion regulation skill 
taught mindfulness skill

## 2021-09-28 NOTE — BH INPATIENT PSYCHIATRY PROGRESS NOTE - NSBHMSELANG_PSY_A_CORE
No abnormalities noted
Unable to assess
Unable to assess
No abnormalities noted
Unable to assess
No abnormalities noted
No abnormalities noted
Unable to assess
No abnormalities noted
Unable to assess
No abnormalities noted
No abnormalities noted
Unable to assess
Unable to assess
No abnormalities noted
No abnormalities noted
Unable to assess
No abnormalities noted
Unable to assess
No abnormalities noted

## 2021-09-28 NOTE — BH PSYCHOLOGY - GROUP THERAPY NOTE - NSBHPSYCHOLGRPNAME_PSY_A_CORE
DBT Homework
DBT Skills

## 2021-09-28 NOTE — BH INPATIENT PSYCHIATRY PROGRESS NOTE - NSTXDCOPLKDATETRGT_PSY_ALL_CORE
09-Sep-2021
23-Sep-2021
16-Sep-2021
19-Aug-2021
19-Aug-2021
30-Sep-2021
02-Sep-2021
02-Sep-2021
19-Aug-2021
30-Sep-2021
02-Sep-2021
25-Aug-2021
09-Sep-2021
25-Aug-2021
16-Sep-2021
23-Sep-2021
19-Aug-2021
19-Aug-2021
09-Sep-2021
09-Sep-2021
25-Aug-2021
02-Sep-2021
02-Sep-2021
23-Sep-2021
25-Aug-2021
30-Sep-2021
23-Sep-2021
25-Aug-2021
16-Sep-2021
25-Aug-2021
30-Sep-2021
23-Sep-2021
25-Aug-2021

## 2021-09-28 NOTE — BH INPATIENT PSYCHIATRY PROGRESS NOTE - NSBHCONSDANGERSELF_PSY_A_CORE
unable to care for self

## 2021-09-28 NOTE — BH INPATIENT PSYCHIATRY PROGRESS NOTE - NSBHMSETHTCONTENT_PSY_A_CORE
Unremarkable
Unable to assess
Unable to assess
Unremarkable
Unable to assess
Unremarkable
Unable to assess
Unremarkable
Unable to assess
Unremarkable
Unable to assess
Unremarkable
Unable to assess
Unremarkable
Unable to assess
Unremarkable
Unremarkable

## 2021-09-28 NOTE — BH PSYCHOLOGY - GROUP THERAPY NOTE - NSPSYCHOLGRPDBTPROB_PSY_A_CORE
psychotic episodes

## 2021-09-28 NOTE — BH INPATIENT PSYCHIATRY PROGRESS NOTE - NSTXDCOPLKDATENEW_PSY_ALL_CORE
25-Aug-2021

## 2021-09-28 NOTE — BH INPATIENT PSYCHIATRY PROGRESS NOTE - NSBHMSEAFFCONG_PSY_A_CORE
Congruent
Unable to assess
Congruent

## 2021-09-28 NOTE — BH INPATIENT PSYCHIATRY PROGRESS NOTE - NSTXDEPRESINTERMD_PSY_ALL_CORE
Titrate aripiprazole
Titrate bupropion to treat depression with psychmotor slowing
Titrate aripiprazole
Titrate antidepressant
Continue ECT
Titrate aripiprazole
Titrate antidepressants
Titrate aripiprazole  ECT on Monday
Titrate bupropion to treat depression with psychmotor slowing
Continue ECT, taper lorazepam
Continue ECT, taper lorazepam
Titrate antidepressants
Titrate aripiprazole  ECT on Monday
Titrate aripiprazole  ECT on Monday
Titrate bupropion to treat depression with psychmotor slowing
Continue ECT, taper lorazepam
Titrate bupropion to treat depression with psychmotor slowing
Titrate antidepressants
Titrate bupropion to treat depression with psychmotor slowing
Titrate antidepressant
Titrate antidepressants
Titrate aripiprazole
Titrate aripiprazole  ECT on Monday
Titrate aripiprazole
Titrate aripiprazole  ECT on Monday
Continue ECT, taper lorazepam
Titrate antidepressant
Titrate antidepressant
Continue ECT
Continue ECT, taper lorazepam
Titrate bupropion to treat depression with psychmotor slowing

## 2021-09-28 NOTE — BH INPATIENT PSYCHIATRY PROGRESS NOTE - NSBHMSEMOOD_PSY_A_CORE
Unable to assess
Normal
Unable to assess
Depressed/Unable to assess
Normal
Depressed
Unable to assess
Depressed
Unable to assess
Depressed
Unable to assess
Unable to assess
Normal
Normal
Depressed
Depressed
Unable to assess
Unable to assess
Other
Other
Depressed
Normal
Normal
Other
Normal
Unable to assess
Depressed
Unable to assess
Depressed
Unable to assess
Unable to assess
Normal
Unable to assess

## 2021-09-28 NOTE — BH INPATIENT PSYCHIATRY PROGRESS NOTE - CURRENT MEDICATION
MEDICATIONS  (STANDING):  ARIPiprazole 5 milliGRAM(s) Oral daily  escitalopram 10 milliGRAM(s) Oral daily    MEDICATIONS  (PRN):  aluminum hydroxide/magnesium hydroxide/simethicone Suspension 30 milliLiter(s) Oral every 6 hours PRN Dyspepsia  haloperidol     Tablet 2 milliGRAM(s) Oral every 6 hours PRN agitation  haloperidol    Injectable 5 milliGRAM(s) IntraMuscular once PRN severe agitation  hydrOXYzine hydrochloride 25 milliGRAM(s) Oral three times a day PRN Anxiety  
MEDICATIONS  (STANDING):  ARIPiprazole 5 milliGRAM(s) Oral daily  escitalopram 10 milliGRAM(s) Oral daily    MEDICATIONS  (PRN):  aluminum hydroxide/magnesium hydroxide/simethicone Suspension 30 milliLiter(s) Oral every 6 hours PRN Dyspepsia  haloperidol     Tablet 2 milliGRAM(s) Oral every 6 hours PRN agitation  haloperidol    Injectable 5 milliGRAM(s) IntraMuscular once PRN severe agitation  hydrOXYzine hydrochloride 25 milliGRAM(s) Oral three times a day PRN Anxiety  
MEDICATIONS  (STANDING):  buPROPion XL (24-Hour) 150 milliGRAM(s) Oral daily  escitalopram 10 milliGRAM(s) Oral daily  LORazepam     Tablet 1 milliGRAM(s) Oral three times a day    MEDICATIONS  (PRN):  haloperidol     Tablet 2 milliGRAM(s) Oral every 6 hours PRN agitation  haloperidol    Injectable 5 milliGRAM(s) IntraMuscular once PRN severe agitation  
MEDICATIONS  (STANDING):  escitalopram 10 milliGRAM(s) Oral daily  LORazepam     Tablet 2 milliGRAM(s) Oral three times a day  QUEtiapine 25 milliGRAM(s) Oral at bedtime    MEDICATIONS  (PRN):  haloperidol     Tablet 2 milliGRAM(s) Oral every 6 hours PRN agitation  haloperidol    Injectable 5 milliGRAM(s) IntraMuscular once PRN severe agitation  
MEDICATIONS  (STANDING):  escitalopram 10 milliGRAM(s) Oral daily  LORazepam     Tablet 1 milliGRAM(s) Oral three times a day    MEDICATIONS  (PRN):  haloperidol     Tablet 2 milliGRAM(s) Oral every 6 hours PRN agitation  haloperidol    Injectable 5 milliGRAM(s) IntraMuscular once PRN severe agitation  
MEDICATIONS  (STANDING):  escitalopram 10 milliGRAM(s) Oral daily  LORazepam     Tablet 1 milliGRAM(s) Oral three times a day    MEDICATIONS  (PRN):  haloperidol     Tablet 2 milliGRAM(s) Oral every 6 hours PRN agitation  haloperidol    Injectable 5 milliGRAM(s) IntraMuscular once PRN severe agitation  
MEDICATIONS  (STANDING):  escitalopram 10 milliGRAM(s) Oral daily  LORazepam     Tablet 0.5 milliGRAM(s) Oral three times a day    MEDICATIONS  (PRN):  haloperidol     Tablet 2 milliGRAM(s) Oral every 6 hours PRN agitation  haloperidol    Injectable 5 milliGRAM(s) IntraMuscular once PRN severe agitation  
MEDICATIONS  (STANDING):  escitalopram 10 milliGRAM(s) Oral daily  LORazepam     Tablet 1 milliGRAM(s) Oral three times a day    MEDICATIONS  (PRN):  haloperidol     Tablet 2 milliGRAM(s) Oral every 6 hours PRN agitation  haloperidol    Injectable 5 milliGRAM(s) IntraMuscular once PRN severe agitation  
MEDICATIONS  (STANDING):  escitalopram 10 milliGRAM(s) Oral daily  LORazepam     Tablet 2 milliGRAM(s) Oral three times a day  QUEtiapine 25 milliGRAM(s) Oral at bedtime    MEDICATIONS  (PRN):  haloperidol     Tablet 2 milliGRAM(s) Oral every 6 hours PRN agitation  haloperidol    Injectable 5 milliGRAM(s) IntraMuscular once PRN severe agitation  
MEDICATIONS  (STANDING):  buPROPion XL (24-Hour) 150 milliGRAM(s) Oral daily  escitalopram 10 milliGRAM(s) Oral daily  LORazepam     Tablet 2 milliGRAM(s) Oral three times a day    MEDICATIONS  (PRN):  haloperidol     Tablet 2 milliGRAM(s) Oral every 6 hours PRN agitation  haloperidol    Injectable 5 milliGRAM(s) IntraMuscular once PRN severe agitation  
MEDICATIONS  (STANDING):  ARIPiprazole 10 milliGRAM(s) Oral daily  escitalopram 10 milliGRAM(s) Oral daily  LORazepam     Tablet 2 milliGRAM(s) Oral three times a day    MEDICATIONS  (PRN):  haloperidol     Tablet 2 milliGRAM(s) Oral every 6 hours PRN agitation  haloperidol    Injectable 5 milliGRAM(s) IntraMuscular once PRN severe agitation  
MEDICATIONS  (STANDING):  ARIPiprazole 10 milliGRAM(s) Oral daily  escitalopram 10 milliGRAM(s) Oral daily    MEDICATIONS  (PRN):  aluminum hydroxide/magnesium hydroxide/simethicone Suspension 30 milliLiter(s) Oral every 6 hours PRN Dyspepsia  haloperidol     Tablet 2 milliGRAM(s) Oral every 6 hours PRN agitation  haloperidol    Injectable 5 milliGRAM(s) IntraMuscular once PRN severe agitation  
MEDICATIONS  (STANDING):  escitalopram 10 milliGRAM(s) Oral daily  LORazepam     Tablet 2 milliGRAM(s) Oral three times a day  QUEtiapine 25 milliGRAM(s) Oral at bedtime    MEDICATIONS  (PRN):  haloperidol     Tablet 2 milliGRAM(s) Oral every 6 hours PRN agitation  haloperidol    Injectable 5 milliGRAM(s) IntraMuscular once PRN severe agitation  
MEDICATIONS  (STANDING):  ARIPiprazole 10 milliGRAM(s) Oral daily  escitalopram 10 milliGRAM(s) Oral daily  LORazepam     Tablet 2 milliGRAM(s) Oral three times a day    MEDICATIONS  (PRN):  haloperidol     Tablet 2 milliGRAM(s) Oral every 6 hours PRN agitation  haloperidol    Injectable 5 milliGRAM(s) IntraMuscular once PRN severe agitation  
MEDICATIONS  (STANDING):  ARIPiprazole 10 milliGRAM(s) Oral daily  escitalopram 10 milliGRAM(s) Oral daily  LORazepam     Tablet 0.5 milliGRAM(s) Oral three times a day    MEDICATIONS  (PRN):  aluminum hydroxide/magnesium hydroxide/simethicone Suspension 30 milliLiter(s) Oral every 6 hours PRN Dyspepsia  haloperidol     Tablet 2 milliGRAM(s) Oral every 6 hours PRN agitation  haloperidol    Injectable 5 milliGRAM(s) IntraMuscular once PRN severe agitation  
MEDICATIONS  (STANDING):  escitalopram 10 milliGRAM(s) Oral daily  LORazepam     Tablet 1 milliGRAM(s) Oral three times a day    MEDICATIONS  (PRN):  haloperidol     Tablet 2 milliGRAM(s) Oral every 6 hours PRN agitation  haloperidol    Injectable 5 milliGRAM(s) IntraMuscular once PRN severe agitation  
MEDICATIONS  (STANDING):  ARIPiprazole 10 milliGRAM(s) Oral daily  escitalopram 10 milliGRAM(s) Oral daily  LORazepam     Tablet 1 milliGRAM(s) Oral three times a day    MEDICATIONS  (PRN):  haloperidol     Tablet 2 milliGRAM(s) Oral every 6 hours PRN agitation  haloperidol    Injectable 5 milliGRAM(s) IntraMuscular once PRN severe agitation  
MEDICATIONS  (STANDING):  ARIPiprazole 10 milliGRAM(s) Oral daily  escitalopram 10 milliGRAM(s) Oral daily  LORazepam     Tablet 2 milliGRAM(s) Oral three times a day    MEDICATIONS  (PRN):  haloperidol     Tablet 2 milliGRAM(s) Oral every 6 hours PRN agitation  haloperidol    Injectable 5 milliGRAM(s) IntraMuscular once PRN severe agitation  
MEDICATIONS  (STANDING):  ARIPiprazole 10 milliGRAM(s) Oral daily  escitalopram 10 milliGRAM(s) Oral daily  LORazepam     Tablet 2 milliGRAM(s) Oral three times a day    MEDICATIONS  (PRN):  haloperidol     Tablet 2 milliGRAM(s) Oral every 6 hours PRN agitation  haloperidol    Injectable 5 milliGRAM(s) IntraMuscular once PRN severe agitation  
MEDICATIONS  (STANDING):  buPROPion XL (24-Hour) 150 milliGRAM(s) Oral daily  escitalopram 10 milliGRAM(s) Oral daily  LORazepam     Tablet 0.5 milliGRAM(s) Oral three times a day    MEDICATIONS  (PRN):  haloperidol     Tablet 2 milliGRAM(s) Oral every 6 hours PRN agitation  haloperidol    Injectable 5 milliGRAM(s) IntraMuscular once PRN severe agitation  
MEDICATIONS  (STANDING):  escitalopram 10 milliGRAM(s) Oral daily  LORazepam     Tablet 2 milliGRAM(s) Oral three times a day  QUEtiapine 25 milliGRAM(s) Oral at bedtime    MEDICATIONS  (PRN):  haloperidol     Tablet 2 milliGRAM(s) Oral every 6 hours PRN agitation  haloperidol    Injectable 5 milliGRAM(s) IntraMuscular once PRN severe agitation  
MEDICATIONS  (STANDING):  ARIPiprazole 10 milliGRAM(s) Oral daily  escitalopram 10 milliGRAM(s) Oral daily    MEDICATIONS  (PRN):  aluminum hydroxide/magnesium hydroxide/simethicone Suspension 30 milliLiter(s) Oral every 6 hours PRN Dyspepsia  haloperidol     Tablet 2 milliGRAM(s) Oral every 6 hours PRN agitation  haloperidol    Injectable 5 milliGRAM(s) IntraMuscular once PRN severe agitation  
MEDICATIONS  (STANDING):  ARIPiprazole 5 milliGRAM(s) Oral daily  escitalopram 10 milliGRAM(s) Oral daily    MEDICATIONS  (PRN):  aluminum hydroxide/magnesium hydroxide/simethicone Suspension 30 milliLiter(s) Oral every 6 hours PRN Dyspepsia  haloperidol     Tablet 2 milliGRAM(s) Oral every 6 hours PRN agitation  haloperidol    Injectable 5 milliGRAM(s) IntraMuscular once PRN severe agitation  hydrOXYzine hydrochloride 25 milliGRAM(s) Oral three times a day PRN Anxiety  
MEDICATIONS  (STANDING):  ARIPiprazole 5 milliGRAM(s) Oral daily  escitalopram 10 milliGRAM(s) Oral daily  LORazepam     Tablet 2 milliGRAM(s) Oral three times a day    MEDICATIONS  (PRN):  haloperidol     Tablet 2 milliGRAM(s) Oral every 6 hours PRN agitation  haloperidol    Injectable 5 milliGRAM(s) IntraMuscular once PRN severe agitation  
MEDICATIONS  (STANDING):  escitalopram 10 milliGRAM(s) Oral daily  LORazepam     Tablet 0.5 milliGRAM(s) Oral three times a day    MEDICATIONS  (PRN):  haloperidol     Tablet 2 milliGRAM(s) Oral every 6 hours PRN agitation  haloperidol    Injectable 5 milliGRAM(s) IntraMuscular once PRN severe agitation  
MEDICATIONS  (STANDING):  escitalopram 10 milliGRAM(s) Oral daily  LORazepam     Tablet 2 milliGRAM(s) Oral three times a day  QUEtiapine 25 milliGRAM(s) Oral at bedtime    MEDICATIONS  (PRN):  haloperidol     Tablet 2 milliGRAM(s) Oral every 6 hours PRN agitation  haloperidol    Injectable 5 milliGRAM(s) IntraMuscular once PRN severe agitation  
MEDICATIONS  (STANDING):  ARIPiprazole 10 milliGRAM(s) Oral daily  escitalopram 10 milliGRAM(s) Oral daily    MEDICATIONS  (PRN):  aluminum hydroxide/magnesium hydroxide/simethicone Suspension 30 milliLiter(s) Oral every 6 hours PRN Dyspepsia  haloperidol     Tablet 2 milliGRAM(s) Oral every 6 hours PRN agitation  haloperidol    Injectable 5 milliGRAM(s) IntraMuscular once PRN severe agitation  
MEDICATIONS  (STANDING):  ARIPiprazole 10 milliGRAM(s) Oral daily  escitalopram 10 milliGRAM(s) Oral daily  LORazepam     Tablet 2 milliGRAM(s) Oral three times a day    MEDICATIONS  (PRN):  haloperidol     Tablet 2 milliGRAM(s) Oral every 6 hours PRN agitation  haloperidol    Injectable 5 milliGRAM(s) IntraMuscular once PRN severe agitation  
MEDICATIONS  (STANDING):  ARIPiprazole 5 milliGRAM(s) Oral daily  escitalopram 10 milliGRAM(s) Oral daily  LORazepam     Tablet 2 milliGRAM(s) Oral three times a day    MEDICATIONS  (PRN):  haloperidol     Tablet 2 milliGRAM(s) Oral every 6 hours PRN agitation  haloperidol    Injectable 5 milliGRAM(s) IntraMuscular once PRN severe agitation  
MEDICATIONS  (STANDING):  ARIPiprazole 10 milliGRAM(s) Oral daily  escitalopram 10 milliGRAM(s) Oral daily  LORazepam     Tablet 1 milliGRAM(s) Oral three times a day    MEDICATIONS  (PRN):  haloperidol     Tablet 2 milliGRAM(s) Oral every 6 hours PRN agitation  haloperidol    Injectable 5 milliGRAM(s) IntraMuscular once PRN severe agitation  
MEDICATIONS  (STANDING):  ARIPiprazole 10 milliGRAM(s) Oral daily  escitalopram 10 milliGRAM(s) Oral daily  LORazepam     Tablet 2 milliGRAM(s) Oral three times a day    MEDICATIONS  (PRN):  haloperidol     Tablet 2 milliGRAM(s) Oral every 6 hours PRN agitation  haloperidol    Injectable 5 milliGRAM(s) IntraMuscular once PRN severe agitation  
MEDICATIONS  (STANDING):  buPROPion XL (24-Hour) 150 milliGRAM(s) Oral daily  escitalopram 10 milliGRAM(s) Oral daily  LORazepam     Tablet 2 milliGRAM(s) Oral three times a day    MEDICATIONS  (PRN):  haloperidol     Tablet 2 milliGRAM(s) Oral every 6 hours PRN agitation  haloperidol    Injectable 5 milliGRAM(s) IntraMuscular once PRN severe agitation  
MEDICATIONS  (STANDING):  ARIPiprazole 5 milliGRAM(s) Oral daily  escitalopram 10 milliGRAM(s) Oral daily    MEDICATIONS  (PRN):  aluminum hydroxide/magnesium hydroxide/simethicone Suspension 30 milliLiter(s) Oral every 6 hours PRN Dyspepsia  haloperidol     Tablet 2 milliGRAM(s) Oral every 6 hours PRN agitation  haloperidol    Injectable 5 milliGRAM(s) IntraMuscular once PRN severe agitation  hydrOXYzine hydrochloride 25 milliGRAM(s) Oral three times a day PRN Anxiety  
MEDICATIONS  (STANDING):  buPROPion XL (24-Hour) 150 milliGRAM(s) Oral daily  escitalopram 10 milliGRAM(s) Oral daily  LORazepam     Tablet 1 milliGRAM(s) Oral three times a day    MEDICATIONS  (PRN):  haloperidol     Tablet 2 milliGRAM(s) Oral every 6 hours PRN agitation  haloperidol    Injectable 5 milliGRAM(s) IntraMuscular once PRN severe agitation

## 2021-09-28 NOTE — BH PSYCHOLOGY - GROUP THERAPY NOTE - NSPSYCHOLGRPDBTGOAL_PSY_A_CORE
reduce mood and affective lability/improve ability to indentify feelings/improve ability to communicate feelings/reduce vulnerability to emotional dysregualation
reduce mood and affective lability/improve ability to indentify feelings/improve ability to communicate feelings/reduce vulnerability to emotional dysregualation
improve ability to indentify feelings/improve ability to communicate feelings/reduce vulnerability to emotional dysregualation
improve ability to indentify feelings/improve ability to communicate feelings/reduce vulnerability to emotional dysregualation
reduce mood and affective lability/improve ability to indentify feelings/improve ability to communicate feelings/reduce vulnerability to emotional dysregualation
reduce mood and affective lability/improve ability to indentify feelings/improve ability to communicate feelings/reduce vulnerability to emotional dysregualation
improve ability to indentify feelings/improve ability to communicate feelings/reduce vulnerability to emotional dysregualation
reduce mood and affective lability/improve ability to indentify feelings/improve ability to communicate feelings/reduce vulnerability to emotional dysregualation

## 2021-09-28 NOTE — BH INPATIENT PSYCHIATRY PROGRESS NOTE - NSBHMSEGROOM_PSY_A_CORE
Good
Fair
Poor
Fair
Fair
Poor
Good
Fair
Good
Poor
Fair
Good
Fair
Good
Fair
Fair
Poor
Fair
Fair
Good
Fair
Fair

## 2021-09-28 NOTE — BH INPATIENT PSYCHIATRY PROGRESS NOTE - NSBHMSESPEECH_PSY_A_CORE
mostly non-verbal; when speaks, no abnormalities, except barely audible/Non-verbal: unable to assess speech further
mostly non-verbal; when speaks, no abnormalities, except barely audible/Abnormal as indicated, otherwise normal...
mostly non-verbal; when speaks, no abnormalities, except barely audible/Abnormal as indicated, otherwise normal...
mostly non-verbal; when speaks, no abnormalities, except barely audible/Non-verbal: unable to assess speech further
mostly non-verbal; when speaks, no abnormalities, except barely audible/Normal volume, rate, productivity, spontaneity and articulation
mostly non-verbal; when speaks, no abnormalities, except barely audible/Non-verbal: unable to assess speech further
mostly non-verbal; when speaks, no abnormalities, except barely audible/Normal volume, rate, productivity, spontaneity and articulation
mostly non-verbal; when speaks, no abnormalities, except barely audible/Non-verbal: unable to assess speech further
mostly non-verbal; when speaks, no abnormalities, except barely audible/Abnormal as indicated, otherwise normal...
mostly non-verbal; when speaks, no abnormalities, except barely audible/Abnormal as indicated, otherwise normal...
mostly non-verbal; when speaks, no abnormalities, except barely audible/Non-verbal: unable to assess speech further
mostly non-verbal; when speaks, no abnormalities, except barely audible/Normal volume, rate, productivity, spontaneity and articulation
mostly non-verbal; when speaks, no abnormalities, except barely audible/Non-verbal: unable to assess speech further
mostly non-verbal; when speaks, no abnormalities, except barely audible/Normal volume, rate, productivity, spontaneity and articulation
mostly non-verbal; when speaks, no abnormalities, except barely audible/Abnormal as indicated, otherwise normal...
mostly non-verbal; when speaks, no abnormalities, except barely audible/Non-verbal: unable to assess speech further
mostly non-verbal; when speaks, no abnormalities, except barely audible/Abnormal as indicated, otherwise normal...
mostly non-verbal; when speaks, no abnormalities, except barely audible/Non-verbal: unable to assess speech further
mostly non-verbal; when speaks, no abnormalities, except barely audible/Abnormal as indicated, otherwise normal...
mostly non-verbal; when speaks, no abnormalities, except barely audible/Abnormal as indicated, otherwise normal...
mostly non-verbal; when speaks, no abnormalities, except barely audible/Non-verbal: unable to assess speech further
mostly non-verbal; when speaks, no abnormalities, except barely audible/Abnormal as indicated, otherwise normal...
mostly non-verbal; when speaks, no abnormalities, except barely audible/Normal volume, rate, productivity, spontaneity and articulation
mostly non-verbal; when speaks, no abnormalities, except barely audible/Normal volume, rate, productivity, spontaneity and articulation
mostly non-verbal; when speaks, no abnormalities, except barely audible/Abnormal as indicated, otherwise normal...
mostly non-verbal; when speaks, no abnormalities, except barely audible/Non-verbal: unable to assess speech further
mostly non-verbal; when speaks, no abnormalities, except barely audible/Non-verbal: unable to assess speech further
mostly non-verbal; when speaks, no abnormalities, except barely audible/Normal volume, rate, productivity, spontaneity and articulation
mostly non-verbal; when speaks, no abnormalities, except barely audible/Non-verbal: unable to assess speech further
mostly non-verbal; when speaks, no abnormalities, except barely audible/Abnormal as indicated, otherwise normal...
mostly non-verbal; when speaks, no abnormalities, except barely audible/Abnormal as indicated, otherwise normal...
mostly non-verbal; when speaks, no abnormalities, except barely audible/Normal volume, rate, productivity, spontaneity and articulation
mostly non-verbal; when speaks, no abnormalities, except barely audible/Non-verbal: unable to assess speech further

## 2021-09-28 NOTE — BH INPATIENT PSYCHIATRY PROGRESS NOTE - MSE OPTIONS
Structured MSE
Unstructured MSE
Structured MSE

## 2021-09-28 NOTE — BH INPATIENT PSYCHIATRY PROGRESS NOTE - NSBHMSEHYG_PSY_A_CORE
Fair
Good
Good
Fair
Fair
Poor
Fair
Poor
Fair
Fair
Good
Poor
Good
Fair
Good
Fair
Poor
Fair
Good

## 2021-10-04 ENCOUNTER — OUTPATIENT (OUTPATIENT)
Dept: OUTPATIENT SERVICES | Facility: HOSPITAL | Age: 26
LOS: 1 days | Discharge: HOME | End: 2021-10-04
Payer: MEDICAID

## 2021-10-05 VITALS
OXYGEN SATURATION: 100 % | RESPIRATION RATE: 16 BRPM | DIASTOLIC BLOOD PRESSURE: 76 MMHG | TEMPERATURE: 99 F | SYSTOLIC BLOOD PRESSURE: 116 MMHG | HEART RATE: 88 BPM

## 2021-10-05 LAB — SARS-COV-2 RNA SPEC QL NAA+PROBE: SIGNIFICANT CHANGE UP

## 2021-10-05 PROCEDURE — 90870 ELECTROCONVULSIVE THERAPY: CPT

## 2021-10-05 NOTE — ECT TREATMENT NOTE - NSECTCOMMENTS_PSY_ALL_CORE
Patient reports feeling much better. Denied any symptoms of catatonia. Said that she's been sleeping and eating fine. Denies feeling depressed or having any manic symptoms. Talked to patient's dad who said that patient is doing very well with no significant residual symptoms.  Pt signed maintenance ECT consent. Decrease frequency to weekly sessions.

## 2021-10-05 NOTE — ECT PRE-PROCEDURE CHECKLIST - NSECTCONSENT_PSY_ALL_CORE
BFA ECT Consent from 9/10/21  Anesthesia  Consent expires 12/10/21/yes BFM ECT Consent from 10/5/21  Anesthesia  Consent expires 12/10/21/yes

## 2021-10-06 DIAGNOSIS — F06.1 CATATONIC DISORDER DUE TO KNOWN PHYSIOLOGICAL CONDITION: ICD-10-CM

## 2021-10-06 DIAGNOSIS — F33.9 MAJOR DEPRESSIVE DISORDER, RECURRENT, UNSPECIFIED: ICD-10-CM

## 2021-10-12 LAB — SARS-COV-2 RNA SPEC QL NAA+PROBE: SIGNIFICANT CHANGE UP

## 2021-10-12 PROCEDURE — 90870 ELECTROCONVULSIVE THERAPY: CPT

## 2021-10-12 NOTE — ECT TREATMENT NOTE - NSECTCOMMENTS_PSY_ALL_CORE
Effectively this was her second weekly tx. Patient stable through the entire treatment interval without recurrence of presenting symptoms. Mood, energy, sleep, and appetite were within normal limits. Spends time on TV or "mindful coloring." Vague answers, some thought blocking, lacked insight re. hospitalization. Denied catatonic sx. Makeup applied with great care, nattily attired.

## 2021-10-19 PROCEDURE — 90870 ELECTROCONVULSIVE THERAPY: CPT

## 2021-10-19 NOTE — ECT TREATMENT NOTE - NSECTCOMMENTS_PSY_ALL_CORE
Patient reports that she's doing much better. Said that she's more motivated and active, exercising, coloring, writing on her diary and said that she'd like to eventually go back to school. Only c/o minor memory problems. Patient would like to taper to q 2 weeks. Talked to his father and also appreciated email chain between her dad and Dr. Marroquin. Dad was in agreement to space sessions to every 2 weeks.

## 2021-10-20 DIAGNOSIS — F33.3 MAJOR DEPRESSIVE DISORDER, RECURRENT, SEVERE WITH PSYCHOTIC SYMPTOMS: ICD-10-CM

## 2021-11-01 LAB — SARS-COV-2 RNA SPEC QL NAA+PROBE: SIGNIFICANT CHANGE UP

## 2021-11-02 DIAGNOSIS — F33.3 MAJOR DEPRESSIVE DISORDER, RECURRENT, SEVERE WITH PSYCHOTIC SYMPTOMS: ICD-10-CM

## 2021-11-02 PROCEDURE — 90870 ELECTROCONVULSIVE THERAPY: CPT

## 2021-11-02 NOTE — ECT TREATMENT NOTE - NSECTCOMMENTS_PSY_ALL_CORE
Pt is calm pleasant and cooperative. She reports feeling well with stable mood through the 1st 2 week interval. She functions well and plans to return to school.  Father advocates for an increased interval.   We'll treat again in 2 weeks and consider extending the interval after that if stable

## 2021-11-15 LAB — SARS-COV-2 RNA SPEC QL NAA+PROBE: SIGNIFICANT CHANGE UP

## 2021-11-16 PROCEDURE — 90870 ELECTROCONVULSIVE THERAPY: CPT

## 2021-11-16 NOTE — ECT PRE-PROCEDURE CHECKLIST - NSPTPREGNANT_PSY_ALL_CORE
signed 11/16/21/denies pregnancy, pregnancy test waiver signed denies pregnancy, pregnancy test waiver signed

## 2021-11-16 NOTE — ECT TREATMENT NOTE - NSECTCOMMENTS_PSY_ALL_CORE
Pt states she is doing great. Denies any depression or catatonia symptoms.  Asking MD if she can come monthly.  Agreed with plan to taper to q3 weeks and then if stable, taper to q4.  Spoke with father who agrees she is doing well and with plan to taper.

## 2021-12-06 LAB — SARS-COV-2 RNA SPEC QL NAA+PROBE: SIGNIFICANT CHANGE UP

## 2021-12-07 PROCEDURE — 90870 ELECTROCONVULSIVE THERAPY: CPT

## 2021-12-07 NOTE — ECT PRE-PROCEDURE CHECKLIST - NSECTCONSENT_PSY_ALL_CORE
ECT BF Acute obtained 10/5/21  Anesthesia  Consent expires 12/10/21/yes ECT BF maintenance obtained 12/7/21  Anesthesia  Consent expires 12/10/21/yes

## 2021-12-07 NOTE — ECT TREATMENT NOTE - NSECTCOMMENTS_PSY_ALL_CORE
Pt is palm, pleasant and cooperative. Pt reports good and stable mood w/o recurrence of depressive or catatonic symptoms. Father confirms and advocates increasing treatment interval.  We'll extend interval to 4 weeks.  Consider discontinue ECT if  stable as pt plans to return to school in January

## 2021-12-22 NOTE — BH TREATMENT PLAN - ANXIETY/PANIC/FEAR NURSING INTERVENTIONS/RECOMMENDATIONS
Encourage use of coping skills.
Monitor mood and behavior. Observe for signs of increased anxiety. Provide staff support.
monitor for signs of increased anxiety. Provide staff support.
22-Dec-2021
Monitor mood and behavior. Provide supportive environment. Assist pt as needed.

## 2022-01-10 LAB — SARS-COV-2 RNA SPEC QL NAA+PROBE: SIGNIFICANT CHANGE UP

## 2022-01-11 PROCEDURE — 90870 ELECTROCONVULSIVE THERAPY: CPT

## 2022-01-11 NOTE — ECT TREATMENT NOTE - NSECTCOMMENTS_PSY_ALL_CORE
Pt reports she is doing well.  denies all active sx of depression and no signs of catatonia observed.  mood is good and affect is congruent. discussed mECT vs mPharmacotherapy vs combo w/ pt and father.  both amenable to continue monthly ECT for now in order to minimize risk of relapse - plan to re-evaluate at 6mo kiki or earlier if pt reports cog AE.  Pt denied AE from ECT and offered no further complaints.

## 2022-01-16 PROCEDURE — 90870 ELECTROCONVULSIVE THERAPY: CPT

## 2022-01-24 NOTE — ED BEHAVIORAL HEALTH ASSESSMENT NOTE - MEDICAL ISSUES AND PLAN (INCLUDE STANDING AND PRN MEDICATION)
-- DO NOT REPLY / DO NOT REPLY ALL --  -- Message is from the [facility]--    General Patient Message      Reason for Call: patient called in to check the status of medication refill patients stated she out of medication please advise     Caller Information       Type Contact Phone    01/24/2022 09:17 AM CST Phone (Incoming) Aishwarya Damon (Self) [phone] (H)          Alternative phone number:n/a    Turnaround time given to caller:   \"This message will be sent to [state Provider's name]. The clinical team will fulfill your request as soon as they review your message.\"    
Patient informed medication was refilled.   
no acute med issues

## 2022-02-14 LAB — SARS-COV-2 RNA SPEC QL NAA+PROBE: SIGNIFICANT CHANGE UP

## 2022-02-16 LAB — HCG UR QL: NEGATIVE — SIGNIFICANT CHANGE UP

## 2022-02-16 NOTE — ECT TREATMENT NOTE - NSECTCOMMENTS_PSY_ALL_CORE
Pt states that she is doing well, denies any current mood or psychotic sx's, denies SI. Will cont [id number], as per outp psych plan Pt states that she is doing well, denies any current mood or psychotic sx's, denies SI. Will reduce freq to [id number], father aware.

## 2022-03-28 LAB — SARS-COV-2 RNA SPEC QL NAA+PROBE: SIGNIFICANT CHANGE UP

## 2022-03-30 PROCEDURE — 90870 ELECTROCONVULSIVE THERAPY: CPT

## 2022-03-30 NOTE — ECT TREATMENT NOTE - NSECTCOMMENTS_PSY_ALL_CORE
Patient reports that she's doing very well. Continues to attend school. Interested in psychology. Said that she's been more extroverted and socializing with friends and family. Good sleep and appetite. Hs been exercising and eating healthy. Continue monthly tx.

## 2022-03-30 NOTE — ECT PRE-PROCEDURE CHECKLIST - NSECTCONSENT_PSY_ALL_CORE
ECT BF maintenance obtained 12/7/21  Anesthesia  Consent expires 4/11/22/yes ECT BF maintenance obtained 12/7/21-22  Anesthesia  Consent expires 4/11/22/yes

## 2022-05-09 LAB — SARS-COV-2 RNA SPEC QL NAA+PROBE: SIGNIFICANT CHANGE UP

## 2022-05-11 PROCEDURE — 90870 ELECTROCONVULSIVE THERAPY: CPT

## 2022-05-11 NOTE — ECT TREATMENT NOTE - NSECTCOMMENTS_PSY_ALL_CORE
Patient reports that she's doing very well. Had finals, did well. Denies insomnia, denies SEs. Good functioning. No mood sx's. Will cont management.

## 2022-06-02 NOTE — ED BEHAVIORAL HEALTH ASSESSMENT NOTE - GROOMING
Appt is due, please help set up. Thank you  
Pt called to inquire about medications, advised will send high priority to PCP to review and advise.    Zaki LAWRENCE RN   [facility]    
Routing refill request to provider for review/approval because:  Labs not current:  Plts  Patient needs to be seen because:  due for follow up  Fail: Patient is 18-79 years of age    [facility] RN        
Good

## 2022-06-14 LAB — SARS-COV-2 RNA SPEC QL NAA+PROBE: DETECTED

## 2022-06-15 NOTE — BH CHART NOTE - NSEVENTNOTEFT_PSY_ALL_CORE
Patient tested positive for COVID 19 by pcr, spoke to father of patient by phone today, he was aware of test result and patient is already isolating, had sx of a cold last week but tested negative by rapid test then, advised 48 hr look-back and notify contacts, reach out to PMD, and reach out to Dr. Hope for guidance. Notified of no tx X 14 days.

## 2022-07-05 PROCEDURE — 90870 ELECTROCONVULSIVE THERAPY: CPT

## 2022-07-05 NOTE — ECT TREATMENT NOTE - NSECTCOMMENTS_PSY_ALL_CORE
Pt reports she is doing well and endorses no active sx of depression.  feels at psychiatric baseline.  mild mem AE that are not functionally impairing.  pt amenable to continuing mECT at current parameters/interval, offered no further complaints. Pt reports she is doing well and endorses no active sx of depression.  feels at psychiatric baseline.  mild mem AE that are not functionally impairing.  given persistent sx remission despite extended interval prompted pt and father to request long interval between tx.  this is reasonable given pt clinical stability.  pt amenable to continuing mECT at current parameters, offered no further complaints.

## 2022-08-02 LAB — HCG UR QL: NEGATIVE — SIGNIFICANT CHANGE UP

## 2022-08-02 PROCEDURE — 90870 ELECTROCONVULSIVE THERAPY: CPT

## 2022-08-02 NOTE — ECT PRE-PROCEDURE CHECKLIST - NSPTPREGNANT_PSY_ALL_CORE
signed 08/02/22/denies pregnancy, pregnancy test waiver signed signed 08/02/22/denies pregnancy/denies pregnancy, pregnancy test waiver signed

## 2022-08-02 NOTE — ECT TREATMENT NOTE - NSECTCOMMENTS_PSY_ALL_CORE
pt reports she is doing well.  denies all active sx of depression and feels at her psychiatric baseline.  mood is good and affect is congruent.  pt denied AE from ECT, amenable to continuing mECT at current parameters/interval, offered no further complaints.

## 2022-09-14 PROCEDURE — 90870 ELECTROCONVULSIVE THERAPY: CPT

## 2022-09-14 NOTE — ECT TREATMENT NOTE - NSECTCOMMENTS_PSY_ALL_CORE
Patient stable through the entire treatment interval without recurrence of presenting symptoms. Mood, energy, sleep, and appetite were within normal limits. Father confirms same by phone. Patient enjoying her classes at [employer] in psychology. We will continue maintenance ECT to help reduce the chances of relapse.

## 2022-10-17 ENCOUNTER — EMERGENCY (EMERGENCY)
Facility: HOSPITAL | Age: 27
LOS: 1 days | Discharge: HOME | End: 2022-10-17
Attending: STUDENT IN AN ORGANIZED HEALTH CARE EDUCATION/TRAINING PROGRAM
Payer: MEDICAID

## 2022-10-17 VITALS
RESPIRATION RATE: 18 BRPM | TEMPERATURE: 98 F | HEART RATE: 88 BPM | SYSTOLIC BLOOD PRESSURE: 100 MMHG | WEIGHT: 138.01 LBS | DIASTOLIC BLOOD PRESSURE: 69 MMHG | OXYGEN SATURATION: 98 %

## 2022-10-17 LAB — SARS-COV-2 RNA SPEC QL NAA+PROBE: SIGNIFICANT CHANGE UP

## 2022-10-17 PROCEDURE — 99285 EMERGENCY DEPT VISIT HI MDM: CPT

## 2022-10-17 PROCEDURE — 82962 GLUCOSE BLOOD TEST: CPT

## 2022-10-17 PROCEDURE — 99284 EMERGENCY DEPT VISIT MOD MDM: CPT

## 2022-10-17 NOTE — ED PROVIDER NOTE - NSFOLLOWUPINSTRUCTIONS_ED_ALL_ED_FT
Alcohol Intoxication      Alcohol intoxication occurs when a person no longer thinks clearly or functions well (becomes impaired) after drinking alcohol. Intoxication can occur with just one drink. The legal definition of alcohol intoxication depends on the amount of alcohol in the blood (blood alcohol concentration, SUDEEP). SUDEEP of 80–100 mg/dL or higher is commonly considered legally intoxicated. The level of impairment depends on:  •The amount of alcohol the person had.      •The person's age, gender, and weight.      •How often the person drinks.      •Whether the person has other medical conditions, such as diabetes, seizures, or a heart condition.      Alcohol intoxication can range from mild to severe. The condition can be dangerous, especially if the person:  •Also took certain drugs or prescription medicines.    •Drinks a large amount of alcohol in a short period of time (binge drinks).  •For women, binge drinking is having four or more drinks at one time.      •For men, binge drinking is having five or more drinks at one time.        If you or anyone around you appears intoxicated, speak up and act.      What are the causes?    This condition is caused by drinking alcohol.      What increases the risk?    The following factors may make you more likely to develop this condition:  •Peer pressure in young adults.      •Difficulty managing stress.      •History of drug or alcohol abuse.      •Combining alcohol with drugs.      •Family history of drug or alcohol abuse.      •Low body weight.      •Binge drinking.        What are the signs or symptoms?    Symptoms of alcohol intoxication can vary from person to person. Symptoms can be mild, moderate, or severe.    Symptoms of mild alcohol intoxication may include:  •Feeling relaxed or sleepy.      •Having mild difficulty with coordination, speech, memory, or attention.      Symptoms of moderate alcohol intoxication may include:  •Extreme emotions, like anger or sadness.      •Moderate difficulty with coordination, speech, memory, or attention.      Symptoms of severe alcohol intoxication may include:  •Severe difficulty with coordination, speech, memory, or attention.      •Passing out.      •Vomiting.      •Confusion.      •Slow breathing.      •Coma.      Intoxication can change quickly from mild to severe. It can cause coma or death, especially in people who are not exposed to alcohol often.      How is this diagnosed?    Your health care provider will ask you how much alcohol you drank and what kind you had. Intoxication may also be diagnosed based on:  •Your symptoms and medical history.      •A physical exam.      •A blood test that measures SUDEEP.      •A smell of alcohol on your breath.        How is this treated?    Treatment for alcohol intoxication may include:  •Being monitored in an emergency department, hospital, or treatment center until your SUDEEP comes down and it is safe for you to go home.      •IV fluids to prevent or treat loss of fluid in the body (dehydration).      •Medicine to treat nausea or vomiting or to get rid of alcohol in the body.      •Counseling (brief intervention) about the dangers of using alcohol.      •Treatment for substance use disorder.      •Oxygen therapy or a breathing machine (ventilator).      Long-term (chronic) exposure to alcohol can have long-term effects on your brain, heart, and gastrointestinal system. These effects can be serious and may also require treatment.      Follow these instructions at home:       Eating and drinking    • Do not drink alcohol if:  •Your health care provider tells you not to drink.      •You are pregnant, may be pregnant, or are planning to become pregnant.      •You are under the legal drinking age (21 years old in the [place]      •You are taking medicines that should not be taken with alcohol.      •You have a medical condition, and alcohol makes it worse.      •You need to drive or perform activities that require you to be alert.      •You have substance use disorder.      •Ask your health care provider if alcohol is safe for you. If your health care provider allows you to drink alcohol, limit how much you have. You may drink:  •0–1 drink a day for women.     • 0–2 drinks a day for men.   •Be aware of how much alcohol is in your drink. In the [place] one drink equals one 12 oz bottle of beer (355 mL), one 5 oz glass of wine (148 mL), or one 1½ oz shot of hard liquor (44 mL).          •Avoid drinking alcohol on an empty stomach.      •Stay hydrated. Drink enough fluid to keep your urine pale yellow. Avoid caffeine because it can dehydrate you.      •Avoid drinking more than one drink per hour.      •When having multiple drinks, drink water or a non-alcoholic beverage between alcoholic drinks.      General instructions     •Take over-the-counter and prescription medicines only as told by your health care provider.      • Do not drive after drinking any amount of alcohol. Plan for a designated  or another way to go home.      •Have someone responsible stay with you while you are intoxicated. You should not be left alone.      •Keep all follow-up visits as told by your health care provider. This is important.        Contact a health care provider if:    •You do not feel better after a few days.      •You have problems at work, at school, or at home due to drinking.        Get help right away if:  •You have any of the following:  •Moderate to severe trouble with coordination, speech, memory, or attention.      •Trouble staying awake.      •Severe confusion.      •A seizure.      •Light-headedness.      •Fainting.      •Vomiting bright red blood or material that looks like coffee grounds.      •Bloody stool (feces). The blood may make your stool bright red, black, or tarry. It may also smell bad.      •Shakiness when trying to stop drinking.      •Thoughts about hurting yourself or others.        If you ever feel like you may hurt yourself or others, or have thoughts about taking your own life, get help right away. You can go to your nearest emergency department or call:   • Your local emergency services (911 in the [place]       • A suicide crisis helpline, such as the National Suicide Prevention Lifeline at 1-260.739.4662. This is open 24 hours a day.         Summary    •Alcohol intoxication occurs when a person no longer thinks clearly or functions well after drinking alcohol.      •If your health care provider says that alcohol is safe for you, limit alcohol intake to no more than 1 drink a day for women (no drinks if you are pregnant) and 2 drinks a day for men. One drink equals 12 oz of beer, 5 oz of wine, or 1½ oz of hard liquor.      •Contact your health care provider if drinking has caused you problems at work, school, or home.      •Get help right away if you have thoughts about hurting yourself or others.      This information is not intended to replace advice given to you by your health care provider. Make sure you discuss any questions you have with your health care provider.
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
## 2022-10-24 LAB — SARS-COV-2 RNA SPEC QL NAA+PROBE: SIGNIFICANT CHANGE UP

## 2022-10-26 PROCEDURE — 90870 ELECTROCONVULSIVE THERAPY: CPT

## 2022-10-26 NOTE — ECT TREATMENT NOTE - NSECTCOMMENTS_PSY_ALL_CORE
Patient stable through the entire treatment interval without recurrence of presenting symptoms. Denies any mood or psychotic sx's, good functioning We will continue maintenance ECT to help reduce the chances of relapse.

## 2022-10-28 ENCOUNTER — EMERGENCY (EMERGENCY)
Facility: HOSPITAL | Age: 27
LOS: 1 days | Discharge: HOME | End: 2022-10-28
Attending: EMERGENCY MEDICINE | Admitting: EMERGENCY MEDICINE

## 2022-10-28 VITALS
OXYGEN SATURATION: 100 % | HEART RATE: 106 BPM | TEMPERATURE: 98 F | SYSTOLIC BLOOD PRESSURE: 130 MMHG | RESPIRATION RATE: 16 BRPM | DIASTOLIC BLOOD PRESSURE: 90 MMHG

## 2022-10-28 VITALS
HEIGHT: 63 IN | HEART RATE: 125 BPM | RESPIRATION RATE: 16 BRPM | SYSTOLIC BLOOD PRESSURE: 110 MMHG | DIASTOLIC BLOOD PRESSURE: 72 MMHG | OXYGEN SATURATION: 100 % | TEMPERATURE: 98 F

## 2022-10-28 PROBLEM — Z78.9 OTHER SPECIFIED HEALTH STATUS: Chronic | Status: ACTIVE | Noted: 2022-10-18

## 2022-10-28 LAB
ALBUMIN SERPL ELPH-MCNC: 4.8 G/DL — SIGNIFICANT CHANGE UP (ref 3.3–5)
ALP SERPL-CCNC: 65 U/L — SIGNIFICANT CHANGE UP (ref 40–120)
ALT FLD-CCNC: 7 U/L — SIGNIFICANT CHANGE UP (ref 4–33)
ANION GAP SERPL CALC-SCNC: 12 MMOL/L — SIGNIFICANT CHANGE UP (ref 7–14)
APAP SERPL-MCNC: <10 UG/ML — LOW (ref 15–25)
AST SERPL-CCNC: 15 U/L — SIGNIFICANT CHANGE UP (ref 4–32)
BASE EXCESS BLDV CALC-SCNC: -1.2 MMOL/L — SIGNIFICANT CHANGE UP (ref -2–3)
BASOPHILS # BLD AUTO: 0.03 K/UL — SIGNIFICANT CHANGE UP (ref 0–0.2)
BASOPHILS NFR BLD AUTO: 0.4 % — SIGNIFICANT CHANGE UP (ref 0–2)
BILIRUB SERPL-MCNC: 0.3 MG/DL — SIGNIFICANT CHANGE UP (ref 0.2–1.2)
BLOOD GAS VENOUS COMPREHENSIVE RESULT: SIGNIFICANT CHANGE UP
BUN SERPL-MCNC: 7 MG/DL — SIGNIFICANT CHANGE UP (ref 7–23)
CALCIUM SERPL-MCNC: 9.6 MG/DL — SIGNIFICANT CHANGE UP (ref 8.4–10.5)
CHLORIDE BLDV-SCNC: 102 MMOL/L — SIGNIFICANT CHANGE UP (ref 96–108)
CHLORIDE SERPL-SCNC: 101 MMOL/L — SIGNIFICANT CHANGE UP (ref 98–107)
CO2 BLDV-SCNC: 25.6 MMOL/L — SIGNIFICANT CHANGE UP (ref 22–26)
CO2 SERPL-SCNC: 24 MMOL/L — SIGNIFICANT CHANGE UP (ref 22–31)
CREAT SERPL-MCNC: 0.65 MG/DL — SIGNIFICANT CHANGE UP (ref 0.5–1.3)
EGFR: 124 ML/MIN/1.73M2 — SIGNIFICANT CHANGE UP
EOSINOPHIL # BLD AUTO: 0.07 K/UL — SIGNIFICANT CHANGE UP (ref 0–0.5)
EOSINOPHIL NFR BLD AUTO: 0.9 % — SIGNIFICANT CHANGE UP (ref 0–6)
ETHANOL SERPL-MCNC: <10 MG/DL — SIGNIFICANT CHANGE UP
GAS PNL BLDV: SIGNIFICANT CHANGE UP MMOL/L (ref 136–145)
GLUCOSE BLDV-MCNC: 126 MG/DL — HIGH (ref 70–99)
GLUCOSE SERPL-MCNC: 144 MG/DL — HIGH (ref 70–99)
HCG SERPL-ACNC: <5 MIU/ML — SIGNIFICANT CHANGE UP
HCO3 BLDV-SCNC: 24 MMOL/L — SIGNIFICANT CHANGE UP (ref 22–29)
HCT VFR BLD CALC: 37.5 % — SIGNIFICANT CHANGE UP (ref 34.5–45)
HCT VFR BLDA CALC: 37 % — SIGNIFICANT CHANGE UP (ref 34.5–46.5)
HGB BLD CALC-MCNC: 12.2 G/DL — SIGNIFICANT CHANGE UP (ref 11.5–15.5)
HGB BLD-MCNC: 12.1 G/DL — SIGNIFICANT CHANGE UP (ref 11.5–15.5)
IANC: 5.9 K/UL — SIGNIFICANT CHANGE UP (ref 1.8–7.4)
IMM GRANULOCYTES NFR BLD AUTO: 0.4 % — SIGNIFICANT CHANGE UP (ref 0–0.9)
LACTATE BLDV-MCNC: 1.8 MMOL/L — SIGNIFICANT CHANGE UP (ref 0.5–2)
LYMPHOCYTES # BLD AUTO: 1.38 K/UL — SIGNIFICANT CHANGE UP (ref 1–3.3)
LYMPHOCYTES # BLD AUTO: 17.4 % — SIGNIFICANT CHANGE UP (ref 13–44)
MCHC RBC-ENTMCNC: 28.7 PG — SIGNIFICANT CHANGE UP (ref 27–34)
MCHC RBC-ENTMCNC: 32.3 GM/DL — SIGNIFICANT CHANGE UP (ref 32–36)
MCV RBC AUTO: 89.1 FL — SIGNIFICANT CHANGE UP (ref 80–100)
MONOCYTES # BLD AUTO: 0.53 K/UL — SIGNIFICANT CHANGE UP (ref 0–0.9)
MONOCYTES NFR BLD AUTO: 6.7 % — SIGNIFICANT CHANGE UP (ref 2–14)
NEUTROPHILS # BLD AUTO: 5.9 K/UL — SIGNIFICANT CHANGE UP (ref 1.8–7.4)
NEUTROPHILS NFR BLD AUTO: 74.2 % — SIGNIFICANT CHANGE UP (ref 43–77)
NRBC # BLD: 0 /100 WBCS — SIGNIFICANT CHANGE UP (ref 0–0)
NRBC # FLD: 0 K/UL — SIGNIFICANT CHANGE UP (ref 0–0)
PCO2 BLDV: 43 MMHG — HIGH (ref 39–42)
PH BLDV: 7.36 — SIGNIFICANT CHANGE UP (ref 7.32–7.43)
PLATELET # BLD AUTO: 290 K/UL — SIGNIFICANT CHANGE UP (ref 150–400)
PO2 BLDV: 36 MMHG — SIGNIFICANT CHANGE UP
POTASSIUM BLDV-SCNC: 3.3 MMOL/L — LOW (ref 3.5–5.1)
POTASSIUM SERPL-MCNC: 3.5 MMOL/L — SIGNIFICANT CHANGE UP (ref 3.5–5.3)
POTASSIUM SERPL-SCNC: 3.5 MMOL/L — SIGNIFICANT CHANGE UP (ref 3.5–5.3)
PROT SERPL-MCNC: 8.2 G/DL — SIGNIFICANT CHANGE UP (ref 6–8.3)
RBC # BLD: 4.21 M/UL — SIGNIFICANT CHANGE UP (ref 3.8–5.2)
RBC # FLD: 14 % — SIGNIFICANT CHANGE UP (ref 10.3–14.5)
SALICYLATES SERPL-MCNC: <0.3 MG/DL — LOW (ref 15–30)
SAO2 % BLDV: 56.3 % — SIGNIFICANT CHANGE UP
SODIUM SERPL-SCNC: 137 MMOL/L — SIGNIFICANT CHANGE UP (ref 135–145)
TOXICOLOGY SCREEN, DRUGS OF ABUSE, SERUM RESULT: SIGNIFICANT CHANGE UP
WBC # BLD: 7.94 K/UL — SIGNIFICANT CHANGE UP (ref 3.8–10.5)
WBC # FLD AUTO: 7.94 K/UL — SIGNIFICANT CHANGE UP (ref 3.8–10.5)

## 2022-10-28 PROCEDURE — 99284 EMERGENCY DEPT VISIT MOD MDM: CPT

## 2022-10-28 PROCEDURE — 93010 ELECTROCARDIOGRAM REPORT: CPT

## 2022-10-28 PROCEDURE — 70450 CT HEAD/BRAIN W/O DYE: CPT | Mod: 26,MA

## 2022-10-28 RX ORDER — SODIUM CHLORIDE 9 MG/ML
1000 INJECTION INTRAMUSCULAR; INTRAVENOUS; SUBCUTANEOUS ONCE
Refills: 0 | Status: COMPLETED | OUTPATIENT
Start: 2022-10-28 | End: 2022-10-28

## 2022-10-28 RX ADMIN — Medication 0.5 MILLIGRAM(S): at 15:52

## 2022-10-28 RX ADMIN — SODIUM CHLORIDE 1000 MILLILITER(S): 9 INJECTION INTRAMUSCULAR; INTRAVENOUS; SUBCUTANEOUS at 16:00

## 2022-10-28 RX ADMIN — SODIUM CHLORIDE 1000 MILLILITER(S): 9 INJECTION INTRAMUSCULAR; INTRAVENOUS; SUBCUTANEOUS at 14:31

## 2022-10-28 NOTE — ED PROVIDER NOTE - CLINICAL SUMMARY MEDICAL DECISION MAKING FREE TEXT BOX
Bridget Aquino DO PGY-2  27 year old female with PMH MDD on ECT presents with syncope prior to arrival. States that she felt lightheaded so she laid down and then the next thing she remembers is that she was on the floor. Witnessed by her father who said she had convulsive like movements for 15 seconds. Patient awoke at baseline, is now asymptomatic. No preceding symptoms. Likely vasovagal syncope vs. seizure less likely. Will obtain EKG, labs, CT head and re-evaluate for dispo.

## 2022-10-28 NOTE — ED PROVIDER NOTE - OBJECTIVE STATEMENT
27 year old female with PMH MDD on ECT presents with syncope prior to arrival. States that she felt lightheaded so she laid down and then the next thing she remembers is that she was on the floor. Witnessed by her father who said she had convulsive like movements for 15 seconds. Patient awoke at baseline, is now asymptomatic. No preceding symptoms. Denies fever, chills, chest pain, shortness of breath, nausea, vomiting, diarrhea, incontinence of stool or urine, weakness. Patient started ECT several months ago, states she started experiencing headache last treatment.

## 2022-10-28 NOTE — ED PROVIDER NOTE - PHYSICAL EXAMINATION
PHYSICAL EXAM:  CONSTITUTIONAL: Well appearing, awake, alert, oriented to person, place, time/situation and in no apparent distress.  HEAD: Atraumatic  EYES: Clear bilaterally, pupils equal, round and reactive to light.  ENMT: Airway patent, Nasal mucosa clear. Mouth with normal mucosa. Uvula is midline. No tongue lacerations.  CARDIAC: Normal rate, regular rhythm. +S1/S2. No murmurs, rubs or gallops.  RESPIRATORY: Breathing unlabored. Breath sounds clear and equal bilaterally.  ABDOMEN:  Soft, nontender, nondistended. No rebound tenderness or guarding.  NEUROLOGICAL: Alert and oriented, no focal deficits, no motor or sensory deficits.   MSK: No clubbing, cyanosis, or edema. Full range of motion of all extremities. No tenderness to palpation. No midline or paraspinal tenderness. No spinal step-offs.   SKIN: Skin warm and dry. No evidence of rashes or lesions. Superficial abrasion over L elbow, not bleeding.

## 2022-10-28 NOTE — ED PROVIDER NOTE - PROGRESS NOTE DETAILS
Bridget Aquino DO PGY-2  I discussed the plan for discharge home with the patient. Patient to follow up with neurology outpatient. Instructed the patient to return to the emergency department with any alarming symptoms, any worsening symptoms, or any other concerning symptoms.  I instructed this patient to call their primary doctor today, to inform them of their visit to the emergency department, and to obtain a repeat evaluation from their primary doctor in the next 1-3 days. The patient understands and agrees with this plan for follow up and feels safe returning home.  At the time of discharge this patient remained in stable condition, remained in no acute distress, and their vital signs remained stable measured within normal limits.

## 2022-10-28 NOTE — ED PROVIDER NOTE - PATIENT PORTAL LINK FT
You can access the FollowMyHealth Patient Portal offered by [facility] by registering at the following website: [email or web address]. By joining [facility]’s FollowMyHealth portal, you will also be able to view your health information using other applications (apps) compatible with our system.

## 2022-10-28 NOTE — ED PROVIDER NOTE - ATTENDING CONTRIBUTION TO CARE
Attending note:   After face to face evaluation of this patient, I concur with above noted hx, pe, and care plan for this patient.  Medrano: 27 yof with MDD and anxiety recent ECT treatement with syncope this AM witnessed. Pt states she felt lightheaded and went to lay down but then fell off bed in between bed and a table. Pt has few minutes of syncope with shaking but no post ictal period, pt was at baseline immediately. Pt has had headaches for few days that is abnormal for post ECT. Pt admits to alcohol and cocaine use but does not want father notified of any history or results. Pt is well appearing, states no sxs now, PERRL, EOMI, tachy without murmur, clear lungs, neck soft and supple, abd soft and non tender, neuro exam is unremarkable. EKG shows sinus tachy - fluids, sxs are likely not seizure given no post ictal or togue biting etc but will get CT head given HA is unusual for pt, labs, tox, fluids and reassess. Pt does appear anxious but denies any SI or HI and father has no safety concerns.

## 2022-10-28 NOTE — ED ADULT NURSE REASSESSMENT NOTE - NS ED NURSE REASSESS COMMENT FT1
Pt states, "I feel so much better". Denies any pain or discomfort at this time.  ST on tele monitor. While obtaining vital signs at bedside, pt's HR increased to 120s-130s but returned back to 100s after vitals taken. Pt anxious with any hands on care. Pt expresses that she is anxious. Hx anxiety.

## 2022-10-28 NOTE — ED ADULT NURSE NOTE - OBJECTIVE STATEMENT
pt from home, aox4 at this time. accompanied by father. concern for possible seizure. per father, pt found on floor near bed jaw clenched and unconscious that lasted approx 30seconds. pt thinks she fell off her bed while sleeping. pt denies pain at this time. no neuro deficits noted. left ac 20g inserted by medics on arrival. pt placed on tele.

## 2022-10-28 NOTE — ED ADULT TRIAGE NOTE - CHIEF COMPLAINT QUOTE
Pt with seizure/fall  witnessed , with seizure  like activities this am  and LOC,. Pt  postictal x 15 seconds.  .  Denies head injury.  As per family,, a MD , pt hit her head, and has no hx of seizure.  Denies headache, dizziness.  Bruising noted on L elbow

## 2022-10-28 NOTE — ED PROVIDER NOTE - NSFOLLOWUPCLINICS_GEN_ALL_ED_FT
[facility]  Neurology  [address] [place] [address] [address] [place] [address]  Phone: [phone]  Fax:   Follow Up Time: 7-10 Days

## 2022-10-28 NOTE — ED PROVIDER NOTE - NSFOLLOWUPINSTRUCTIONS_ED_ALL_ED_FT
You were seen in the emergency department after you passed out.    While you were here you had blood work, a CT scan, and EKG done. The results were discussed with you.    Please follow up with a neurologist within 1 week.   Follow up with your primary care doctor within 3 days. Let them know you were seen in the ER.    Continue all regular home medications as prescribed.     You were given copies of the labs and imaging results, please take them to your follow up appointments.    Return to the ER for any worsening symptoms or concerns including chest pain, trouble breathing, fainting, or any other concerns.

## 2022-11-04 ENCOUNTER — APPOINTMENT (OUTPATIENT)
Dept: NEUROLOGY | Facility: CLINIC | Age: 27
End: 2022-11-04

## 2022-11-04 VITALS
HEART RATE: 69 BPM | SYSTOLIC BLOOD PRESSURE: 99 MMHG | HEIGHT: 63.5 IN | WEIGHT: 127 LBS | BODY MASS INDEX: 22.23 KG/M2 | DIASTOLIC BLOOD PRESSURE: 64 MMHG

## 2022-11-04 DIAGNOSIS — R55 SYNCOPE AND COLLAPSE: ICD-10-CM

## 2022-11-04 DIAGNOSIS — Z78.9 OTHER SPECIFIED HEALTH STATUS: ICD-10-CM

## 2022-11-04 DIAGNOSIS — F32.A DEPRESSION, UNSPECIFIED: ICD-10-CM

## 2022-11-04 PROCEDURE — 99205 OFFICE O/P NEW HI 60 MIN: CPT

## 2022-11-04 RX ORDER — PROMETHAZINE HYDROCHLORIDE AND DEXTROMETHORPHAN HYDROBROMIDE ORAL SOLUTION 15; 6.25 MG/5ML; MG/5ML
6.25-15 SOLUTION ORAL
Qty: 210 | Refills: 0 | Status: COMPLETED | COMMUNITY
Start: 2022-06-02

## 2022-11-04 RX ORDER — ARIPIPRAZOLE 2 MG/1
2 TABLET ORAL
Qty: 30 | Refills: 0 | Status: ACTIVE | COMMUNITY
Start: 2022-10-24

## 2022-11-04 RX ORDER — ESCITALOPRAM OXALATE 20 MG/1
20 TABLET ORAL
Qty: 30 | Refills: 0 | Status: ACTIVE | COMMUNITY
Start: 2022-10-24

## 2022-11-04 RX ORDER — AZITHROMYCIN 250 MG/1
250 TABLET, FILM COATED ORAL
Qty: 6 | Refills: 0 | Status: DISCONTINUED | COMMUNITY
Start: 2022-06-02

## 2022-11-04 RX ORDER — NEOMYCIN SULFATE, POLYMYXIN B SULFATE AND DEXAMETHASONE 3.5; 10000; 1 MG/ML; [USP'U]/ML; MG/ML
3.5-10000-0.1 SUSPENSION OPHTHALMIC
Qty: 5 | Refills: 0 | Status: COMPLETED | COMMUNITY
Start: 2022-05-16
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
## 2022-11-04 NOTE — ASSESSMENT
[FreeTextEntry1] : Ms. TOMPKINS is a 28yo F presenting with history strongly suggestive of syncope in setting of decreased PO intake, prior night of little sleep, vaping nicotine, syncope preceded by typical presyncopal complaints, associated with a few jerks typical of syncope, and followed by rapid recovery and return to baseline without postictal confusion.  CT head unrevealing. Exam normal \par \par Plan\par 1. no further w/u at this time.  If event recurs, will get amb EEG - but at this point, no suspicion of neurological etiology\par 2. f/u prn.\par \par I have spent 60 minutes or longer reviewing patient data or discussing with the patient  the cause of syncope or seizure-like events and comorbid conditions, assessing the risk of recurrence, educating the patient or family, and documenting encounter and plan. I also discussed ways of reducing syncope risk. Greater than 50% of the encounter time was spent on counseling and coordination of care for reviewing records in [employer], discussion with patient regarding plan.

## 2022-11-21 LAB — SARS-COV-2 RNA SPEC QL NAA+PROBE: SIGNIFICANT CHANGE UP

## 2022-11-23 PROCEDURE — 90870 ELECTROCONVULSIVE THERAPY: CPT

## 2022-11-23 NOTE — ECT TREATMENT NOTE - NSECTCOMMENTS_PSY_ALL_CORE
Patient stable through the entire treatment interval Denies any mood or psychotic sx's, good functioning, enjys her classes.  We will continue maintenance ECT to help reduce the chances of relapse.

## 2022-12-06 ENCOUNTER — OUTPATIENT (OUTPATIENT)
Dept: OUTPATIENT SERVICES | Facility: HOSPITAL | Age: 27
LOS: 1 days | Discharge: HOME | End: 2022-12-06
Payer: MEDICAID

## 2022-12-06 DIAGNOSIS — F12.10 CANNABIS ABUSE, UNCOMPLICATED: ICD-10-CM

## 2022-12-27 LAB — SARS-COV-2 RNA SPEC QL NAA+PROBE: SIGNIFICANT CHANGE UP

## 2022-12-30 PROCEDURE — 90870 ELECTROCONVULSIVE THERAPY: CPT

## 2022-12-30 NOTE — ECT AMBULATORY DISCHARGE PLAN - NSPOSTECTNEXTSCHTXDT_PSY_ALL_CORE
Colonoscopy Procedure Note      Place of Service: [facility]    Patient: Cristin Velasquez    MRN# [id number]    Date of procedure: 6/26/2018    Surgeon: Carlos Frey MD    Primary Physician: Franko Mejia DO    Operative Procedure: Colonoscopy    Preoperative Diagnosis: Personal history of colon polyps, last colonoscopy was  6  years    Anesthesia Administered: Fentanyl 175 mcg IV, Versed 6 mg IV and Benadryl 25 mg IV    Moderate sedation was administered with continuous monitoring of the patient by a trained caregiver under my direct supervision. Please refer to nursing documentation for doses of medications administered intravenously as well as the patient’s status during the procedure. Total sedation (intra-service) time was 20 minutes, in patient greater than 5 years of age Yes.    Procedure Description: The patient was placed in the left lateral position and monitored continuously through ECG tracing, pulse oximetry monitoring and direct observations. Medications were administered incrementally over the course of the procedure to achieve an adequate level of conscious sedation. After anorectal examination was performed, the Olympus PCF-H190DL was inserted into the rectum and advanced under direct vision to the terminal ileum. The procedure was considered More difficult than average. Additional maneuvers used to reach the cecum, changed scope..      During withdrawal examination, the final quality of the prep was [employer] bowel prep scale.  Right colon:3- (entire mucosa of colon segment seen well, with no residual staining, small fragments of stool, or opaque liquid)  Transverse colon: 3- (entire mucosa of colon segment seen well, with no residual staining, small fragments of stool, or opaque liquid)  Left: 3- (entire mucosa of colon segment seen well, with no residual staining, small fragments of stool, or opaque liquid)    A careful inspection was made as the colonoscope was withdrawn. A  retroflexed view of the rectum was performed.  Findings and interventions are described below. Appropriate photo documentation was obtained.    Overall Cristin Velasquez tolerated the procedure well, without undue discomfort, hypotension or desaturation. The patient was adequately recovered in the endoscopy suite and was transported to PACU.    Events:   Scope in: Scope In: 1315   At Cecum: At Cecum: 1319  Scope Out: Scope Out: 1328  Scope Withdrawal time: Scope Withdrawal Time: 9  Cecum Surgically Absent:      Findings:  - Visualized portion of the terminal ileum was normal  - 4 mm smooth sessile polyp removed by cold biopsy polypectomy from the ascending colon and sent for pathology  - Scattered pandiverticulosis  - Tight turns in the sigmoid likely due to history of surgery, pediatric colonoscope was used  - Small mixed type hemorrhoids    Interventions:   1 Polyp(s) removed by cold biopsy    Complications: none    Impression:  - 4 mm polyp removed from ascending  - Scattered pandiverticulosis  - Tight turns in the sigmoid likely due to history of surgery  - Small mixed type hemorrhoids    Recommendations  · High fiber diet, avoid constipation   · Awaiting pathology results due to biopsy(s) performed.  · Resume Eliquis tomorrow                 25-Jan-2023 08:30

## 2022-12-30 NOTE — ECT TREATMENT NOTE - NSECTCOMMENTS_PSY_ALL_CORE
Patient stable through the entire (extended by one week due to fever) treatment interval without recurrence of presenting symptoms. Mood, energy, sleep, and appetite were within normal limits. Taking some coursework over the Winter session. Bright affect, carefully applied maquillage. No substance use or suicidal ideation. We will continue maintenance ECT to help reduce the chances of relapse.

## 2023-01-21 NOTE — ED BEHAVIORAL HEALTH ASSESSMENT NOTE - ORAL MEDICATION DETAILS
Interval History: Patient was seen resting in bed. No overnight events. IR biopsy planned successful. Awaiting results. Will follow recommendations of Dr. Hernandes    Review of Systems   Constitutional:  Negative for chills, diaphoresis, fatigue and fever.   HENT:  Negative for congestion, rhinorrhea, sinus pressure, sinus pain and sore throat.    Respiratory:  Negative for cough, chest tightness, shortness of breath and wheezing.    Cardiovascular:  Negative for chest pain, palpitations and leg swelling.   Gastrointestinal:  Negative for abdominal distention, abdominal pain, constipation, diarrhea, nausea and vomiting.   Musculoskeletal:  Positive for back pain. Negative for arthralgias and myalgias.   Skin:  Negative for rash and wound.   Neurological:  Positive for weakness. Negative for dizziness, syncope, light-headedness, numbness and headaches.   All other systems reviewed and are negative.  Objective:     Vital Signs (Most Recent):  Temp: 97.7 °F (36.5 °C) (01/21/23 0712)  Pulse: 85 (01/21/23 0712)  Resp: 20 (01/21/23 0712)  BP: 118/67 (01/21/23 0712)  SpO2: 95 % (01/21/23 0712) Vital Signs (24h Range):  Temp:  [97.7 °F (36.5 °C)-98.2 °F (36.8 °C)] 97.7 °F (36.5 °C)  Pulse:  [61-95] 85  Resp:  [15-25] 20  SpO2:  [93 %-99 %] 95 %  BP: (105-160)/(53-89) 118/67     Weight: 61.3 kg (135 lb 2.3 oz)  Body mass index is 21.81 kg/m².  No intake or output data in the 24 hours ending 01/21/23 0945   Physical Exam  Vitals reviewed.   Constitutional:       General: She is awake.      Appearance: Normal appearance. She is well-developed, well-groomed and normal weight.   HENT:      Head: Normocephalic and atraumatic.      Right Ear: External ear normal.      Left Ear: External ear normal.      Mouth/Throat:      Mouth: Mucous membranes are moist.      Pharynx: Oropharynx is clear.   Eyes:      Extraocular Movements: Extraocular movements intact.      Pupils: Pupils are equal, round, and reactive to light.   Cardiovascular:       Rate and Rhythm: Regular rhythm.      Pulses: Normal pulses.      Heart sounds: Normal heart sounds.   Pulmonary:      Effort: Pulmonary effort is normal.      Breath sounds: Normal breath sounds.   Abdominal:      General: Bowel sounds are normal.      Palpations: Abdomen is soft.   Musculoskeletal:      Cervical back: Neck supple.      Thoracic back: Tenderness present.      Comments: tenderness to palpation from T6 to sacral area   Skin:            Comments: Stage 1 pressure wound over sacrum   Neurological:      Mental Status: She is alert.   Psychiatric:         Behavior: Behavior is cooperative.       Significant Labs: All pertinent labs within the past 24 hours have been reviewed.    Significant Imaging: I have reviewed all pertinent imaging results/findings within the past 24 hours.   Ativan 1 mg PO x 1

## 2023-02-02 NOTE — BH CANCELLATION/NO SHOW NOTE - NSCANCELCOMMENT_PSY_ALL_CORE
As per father patient has runny nose.
WILL CALL WHEN DONE WITH MEDICAL CLEARANCE
Sinus Head ache
Class exam

## 2023-02-13 LAB — SARS-COV-2 RNA SPEC QL NAA+PROBE: SIGNIFICANT CHANGE UP

## 2023-02-14 PROCEDURE — 90870 ELECTROCONVULSIVE THERAPY: CPT

## 2023-02-14 NOTE — ECT PRE-PROCEDURE CHECKLIST - SELECT TESTS ORDERED
COVID-19

## 2023-02-14 NOTE — ECT PRE-PROCEDURE CHECKLIST - PERIPHERAL IV: INSERTION DATE
07-Dec-2021
11-May-2022
23-Nov-2022
05-Jul-2022
11-Jan-2022
16-Feb-2022
14-Feb-2023
30-Dec-2022
16-Nov-2021
19-Oct-2021
14-Sep-2022
30-Mar-2022

## 2023-02-14 NOTE — ECT PRE-PROCEDURE CHECKLIST - NS PREOP CHK TEST_COVID_DT_GEN_ALL_CORE
28-Sep-2021
01-Nov-2021
27-Dec-2022
24-Oct-2022
13-Feb-2023
15-Nov-2021
09-May-2022
06-Dec-2021
14-Feb-2022
29-Jun-2022
05-Oct-2021
14-Jun-2022
29-Jun-2022
12-Oct-2021
21-Nov-2022
10-Keshawn-2022

## 2023-02-14 NOTE — ECT PRE-PROCEDURE CHECKLIST - INV PERIPH IV SIZE
22 gauge
22 gauge
22 gauge/1.0 in length
22 gauge
24 gauge
24 gauge
22 gauge

## 2023-02-14 NOTE — ECT PRE-PROCEDURE CHECKLIST - INV PERIPH IV LOCATION
Right:/median cubital vein
Right:/basilic vein
Right:/median cubital vein
Right:/basilic vein
Right:/median cubital vein
Right:/basilic vein
Right:
Right:/basilic vein
Right:/median cubital vein
Right:/median cubital vein
Right:/basilic vein
Right:/median cubital vein

## 2023-02-14 NOTE — ECT TREATMENT NOTE - NSECTCOMMENTS_PSY_ALL_CORE
Patient stable through the entire, extended treatment interval without recurrence of presenting symptoms. Appointment rescheduled X3 due to lack of medical clearance and a school scheduling conflict. Mood, energy, sleep, and appetite were within normal limits through the 6 weeks. 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 We will continue maintenance ECT to help reduce the chances of relapse; patient to discuss duration of maintenance ECT with her primary psychiatrist.

## 2023-02-14 NOTE — ECT AMBULATORY DISCHARGE PLAN - NSPOSTECTADDSCHEDAPPTS_PSY_ALL_CORE
Covid test 11/1/21 between 8-10 am
Please come for COVID testing 1/3/21 8am-10 am 
COVID TESTING 11/21/2022 BETWEEN 4-6 PM
covid testing on 02/07/22 between 08 am and 10 am, needs updated medical clearance by 02/07/22
covid testing on 03/14/22 between 08 am and 10 am 
COVID TESTING 10/10/2022 BETWEEN 4-6 PM
COVID TEST 3/13/23 BETWEEN 8-10 AM
COVID TESTING 12/19/2022 BETWEEN 4-6 PM
covid test to be done on 4/26/22 between 4-6 pm
Covid 19 test appointment 1/23/23 between 8am-10am
COVID TESTING 6/6/2022 BETWEEN 4-6 PM
covid test 11/15/21 between 8-10 am
covid test 12/6/21 between 8-10 am

## 2023-02-14 NOTE — ECT PRE-PROCEDURE CHECKLIST - NS PREOP CHK TEST_COVID RESULT_GEN_ALL_CORE
Negative
Positive
Negative
Positive
Positive
Negative

## 2023-04-18 NOTE — BH CANCELLATION/NO SHOW NOTE - NSCANCELREASONOTHER_PSY_ALL_CORE
Pending covid test
Holidays
father will call back to reschedule
no medical clearance
school conflict

## 2023-04-28 VITALS
HEART RATE: 89 BPM | DIASTOLIC BLOOD PRESSURE: 72 MMHG | TEMPERATURE: 97 F | RESPIRATION RATE: 17 BRPM | OXYGEN SATURATION: 97 % | SYSTOLIC BLOOD PRESSURE: 119 MMHG

## 2023-04-28 PROCEDURE — 90870 ELECTROCONVULSIVE THERAPY: CPT

## 2023-04-28 NOTE — ECT TREATMENT NOTE - NSECTCPTCODE_PSY_ALL_CORE
85030 (ECT-Electroconvulsive Therapy)
14977 (ECT-Electroconvulsive Therapy)
55291 (ECT-Electroconvulsive Therapy)
67697 (ECT-Electroconvulsive Therapy)
80011 (ECT-Electroconvulsive Therapy)
32202 (ECT-Electroconvulsive Therapy)
71067 (ECT-Electroconvulsive Therapy)
83599 (ECT-Electroconvulsive Therapy)
92286 (ECT-Electroconvulsive Therapy)
26919 (ECT-Electroconvulsive Therapy)
78639 (ECT-Electroconvulsive Therapy)
25629 (ECT-Electroconvulsive Therapy)
18216 (ECT-Electroconvulsive Therapy)
79919 (ECT-Electroconvulsive Therapy)
20903 (ECT-Electroconvulsive Therapy)
07205 (ECT-Electroconvulsive Therapy)
66668 (ECT-Electroconvulsive Therapy)
55791 (ECT-Electroconvulsive Therapy)

## 2023-04-28 NOTE — ECT PRE-PROCEDURE CHECKLIST - NSPTSENTVIA_PSY_ALL_CORE
stretcher
ambulate
ambulate
stretcher
ambulate
stretcher
ambulate
stretcher

## 2023-04-28 NOTE — ECT PRE-PROCEDURE CHECKLIST - NSADULTACCOMPNAME_PSY_ALL_CORE
MADI
Maryam Knox
MADI
Maryam Knox
Maryam Knox
MADI
Maryam Knox
Maryam Knox
MADI
Maryam Knox
Maryam Knox
MADI
Maryam Knox
MADI

## 2023-04-28 NOTE — ECT TREATMENT NOTE - NSCGIIMPROVESX_PSY_ALL_CORE
2 = Much improved - notably better with signficant reduction of symptoms; increase in the level of functioning but some symptoms remain
1 - Very much improved - nearly all better; good level of functioning; minimal symptoms; represents a very substantial change
4 = No change - symptoms remain essentially unchanged
1 - Very much improved - nearly all better; good level of functioning; minimal symptoms; represents a very substantial change
2 = Much improved - notably better with signficant reduction of symptoms; increase in the level of functioning but some symptoms remain
1 - Very much improved - nearly all better; good level of functioning; minimal symptoms; represents a very substantial change
2 = Much improved - notably better with signficant reduction of symptoms; increase in the level of functioning but some symptoms remain
2 = Much improved - notably better with signficant reduction of symptoms; increase in the level of functioning but some symptoms remain
1 - Very much improved - nearly all better; good level of functioning; minimal symptoms; represents a very substantial change

## 2023-04-28 NOTE — ECT PRE-PROCEDURE CHECKLIST - CAREGIVER PHONE NUMBER
[phone]
[phone]
[phone]
[phone]
[phone]
[phone]
[phone]
[phone]
[phone]
[phone]
[phone]
[phone]
[phone]
[phone]
[phone]
[phone]
[phone]
[phone]

## 2023-04-28 NOTE — ECT PRE-PROCEDURE CHECKLIST - NSPROEDAREADYLEARNOTH_GEN_A_NUR
anxiety

## 2023-04-28 NOTE — ECT PRE-PROCEDURE CHECKLIST - PATIENT'S PREFERRED PRONOUN
Her/She
normal S1, S2 heard
Her/She

## 2023-04-28 NOTE — ECT PRE-PROCEDURE CHECKLIST - ALLERGIES
Allergies:-  No Known Allergies      

## 2023-04-28 NOTE — ECT AMBULATORY DISCHARGE PLAN - NSPOSTECTPOSSCOMP_PSY_ALL_CORE
Headache, nausea, general body aches are common experiences after this treatment.  These may be treated with over-the-counter pain medication.

## 2023-04-28 NOTE — ECT PRE-PROCEDURE CHECKLIST - NSPROPTRIGHTBILLOFRIGHTS_GEN_A_NUR
patient

## 2023-04-28 NOTE — ECT AMBULATORY DISCHARGE PLAN - NSPOSTECTCONTPROV_PSY_ALL_CORE
[facility] ([facility]) ECT Suite at [phone]
[facility] ([facility]) ECT Suite at [phone]
[facility] ([facility]) ECT Suite at [phone]
[facility] ([facility]) ECT Suite at [phone]
[facility] ([facility]) ECT Suite at [phone]
[facility] ([facility]) ECT Suite at [phone]
[facility] ([facility]) ECT Suite at [phone]
[facility] ([facility]) ECT Suite at [phone]
[facility] ([facility]) ECT Suite at [phone]
[facility] ([facility]) ECT Suite at [phone]
[facility] ([facility]) ECT Suite at [phone]
[facility] ([facility]) ECT Suite at [phone]
[facility] ([facility]) ECT Suite at [phone]
[facility] ([facility]) ECT Suite at [phone]
[facility] ([facility]) ECT Suite at [phone]
[facility] ([facility]) ECT Suite at [phone]
[facility] ([facility]) ECT Suite at [phone]
[facility] ([facility]) ECT Suite at [phone]

## 2023-04-28 NOTE — ECT PRE-PROCEDURE CHECKLIST - AS BP NONINV SITE
right upper arm
left upper arm
left upper arm
right upper arm
left upper arm
right upper arm
right upper arm
left upper arm
right upper arm
left upper arm

## 2023-04-28 NOTE — ECT PRE-PROCEDURE CHECKLIST - PRO INTERPRETER NEED 2
English

## 2023-04-28 NOTE — ECT TREATMENT NOTE - NSECTPOSTTXSUMMARY_PSY_ALL_CORE
The patient had a well modified grand mal seizure under general anesthesia and muscle relaxant. The patient is alert, responsive, in no acute distress.  Recovery uneventful.
The patient had a well modified grand mal seizure under general anesthesia and muscle relaxant. The patient is alert, responsive, in no acute distress.  Recovery uneventful.
The patient had a well modified grand mal seizure under general anesthesia and muscle relaxant.  The patient is alert, responsive, and in no acute distress. Recovery uneventful.
The patient had a well modified grand mal seizure under general anesthesia and muscle relaxant. The patient is alert, responsive, in no acute distress.  Recovery uneventful. 
The patient had a well modified grand mal seizure under general anesthesia and muscle relaxant.  The patient is alert, responsive, and in no acute distress. Recovery uneventful.
The patient had a well modified grand mal seizure under general anesthesia and muscle relaxant. The patient is alert, responsive, in no acute distress.  Recovery uneventful.
The patient had a well modified grand mal seizure under general anesthesia and muscle relaxant.  The patient is alert, responsive, and in no acute distress. Recovery uneventful.
The patient had a well modified grand mal seizure under general anesthesia and muscle relaxant. The patient is alert, responsive, in no acute distress.  Recovery uneventful. 

## 2023-04-28 NOTE — ECT AMBULATORY DISCHARGE PLAN - NSPOSTECTPROVEDUCFT_PSY_ALL_CORE
COVID TEACHING  ECT TEACHING REINFORCED
Covid Education
Covid Education
Covid teaching and D/C instructions 
post ECT discharge instructions, covid educational material
COVID TEACHING   ECT REINFORCED
Covid 19 test appointment 1/23/23 between 8am-10am
COVID education sheet
COVID EDUCATION , POST ECT INSTRUCTIONS
COVID EDUCATION , POST ECT INSTRUCTIONS
Covid Education
Covid Education
COVID TEACHING  ECT REINFORCED
COVID TEACHING  ECT REINFORCED
post ECT discharge instructions, covid educational material 
Covid Education
COVID TEACHING  ECT TEACHING REINFORCED
COVID TEACHING  ECT TEACHING REINFORCED

## 2023-04-28 NOTE — ECT AMBULATORY DISCHARGE PLAN - NSPOSTECTDIET_PSY_ALL_CORE
Gradually resume your regular diet/Increase fluids

## 2023-04-28 NOTE — ECT PRE-PROCEDURE CHECKLIST - NSECTNPODT_PSY_ALL_CORE
12-Oct-2021 04:00
11-May-2022 00:00
22-Nov-2022 21:00
15-Nov-2021 20:00
27-Apr-2023 22:00
15-Feb-2022 22:00
01-Aug-2022 20:00
04-Jul-2022 10:30
30-Mar-2022 00:00
25-Oct-2022 20:00
07-Dec-2021 06:30
29-Dec-2022 20:00
05-Oct-2021 04:00
19-Oct-2021 10:45
13-Feb-2023 20:00
01-Nov-2021 20:00
10-Keshawn-2022 23:00
13-Sep-2022 22:00

## 2023-04-28 NOTE — ECT TREATMENT NOTE - NSECTPTEVAL_PSY_ALL_CORE
Patient evaluated and History and Physical reviewed prior to ECT. There are no significant changes to the patient's condition unless specified.

## 2023-04-28 NOTE — ECT TREATMENT NOTE - NSECTTHYPULSE1ST_PSY_ALL_CORE
1 ms (DGX)

## 2023-04-28 NOTE — ECT PRE-PROCEDURE CHECKLIST - NSADULTACCOMPPHNUMBER_PSY_ALL_CORE
Quasem/Father
[phone]
[phone]
[phone]
Quasem/Father
[phone]
[phone]
[phone]
[phone]
[phone]
[phone]
[phone]
Quasem/Father
[phone]
[phone]
Quasem/Father

## 2023-04-28 NOTE — ECT AMBULATORY DISCHARGE PLAN - NSPOSTECTSYMPTOMS_PSY_ALL_CORE
Excessive Diarrhea/Fever/Inability to tolerate liquids or foods/Increased irritability or sluggishness/Nausea and vomiting that does not stop/Pain not relieved by medications/Unable to urinate

## 2023-04-28 NOTE — ECT TREATMENT NOTE - NSECTFOCPECOMPLET_PSY_ALL_CORE
Focused Physical Exam Completed

## 2023-04-28 NOTE — ECT PRE-PROCEDURE CHECKLIST - PATIENT'S SEXUAL ORIENTATION
Heterosexual

## 2023-04-28 NOTE — ECT AMBULATORY DISCHARGE PLAN - PATIENT PORTAL LINK FT
You can access the FollowMyHealth Patient Portal offered by [facility] by registering at the following website: [email or web address]. By joining [employer]’s FollowMyHealth portal, you will also be able to view your health information using other applications (apps) compatible with our system.
You can access the FollowMyHealth Patient Portal offered by [facility] by registering at the following website: [email or web address]. By joining [employer]’s FollowMyHealth portal, you will also be able to view your health information using other applications (apps) compatible with our system.
You can access the FollowMyHealth Patient Portal offered by [facility] by registering at the following website: [email or web address]. By joining [employer]’s FollowMyHealth portal, you will also be able to view your health information using other applications (apps) compatible with our system.
You can access the FollowMyHealth Patient Portal offered by [facility] by registering at the following website: [email or web address]. By joining [employer]’s FollowMyHealth portal, you will also be able to view your health information using other applications (apps) compatible with our system.
You can access the FollowMyHealth Patient Portal offered by [facility] by registering at the following website: [email or web address]. By joining [employer]’s FollowMyHealth portal, you will also be able to view your health information using other applications (apps) compatible with our system.
You can access the FollowMyHealth Patient Portal offered by [facility] by registering at the following website: [email or web address]. By joining [employer]’s FollowMyHealth portal, you will also be able to view your health information using other applications (apps) compatible with our system.
You can access the FollowMyHealth Patient Portal offered by [facility] by registering at the following website: [email or web address]. By joining [employer]’s FollowMyHealth portal, you will also be able to view your health information using other applications (apps) compatible with our system.
You can access the FollowMyHealth Patient Portal offered by [facility] by registering at the following website: [email or web address]. By joining [employer]’s FollowMyHealth portal, you will also be able to view your health information using other applications (apps) compatible with our system.
You can access the FollowMyHealth Patient Portal offered by [facility] by registering at the following website: [email or web address]. By joining [employer]’s FollowMyHealth portal, you will also be able to view your health information using other applications (apps) compatible with our system.
You can access the FollowMyHealth Patient Portal offered by [facility] by registering at the following website: [email or web address]. By joining [employer]’s FollowMyHealth portal, you will also be able to view your health information using other applications (apps) compatible with our system.
You can access the FollowMyHealth Patient Portal offered by [facility] by registering at the following website: [email or web address]. By joining [employer]’s FollowMyHealth portal, you will also be able to view your health information using other applications (apps) compatible with our system.
You can access the FollowMyHealth Patient Portal offered by [facility] by registering at the following website: [email or web address]. By joining [employer]’s FollowMyHealth portal, you will also be able to view your health information using other applications (apps) compatible with our system.
You can access the FollowMyHealth Patient Portal offered by [facility] by registering at the following website: [email or web address]. By joining [employer]’s FollowMyHealth portal, you will also be able to view your health information using other applications (apps) compatible with our system.
You can access the FollowMyHealth Patient Portal offered by [facility] by registering at the following website: [email or web address]. By joining [employer]’s FollowMyHealth portal, you will also be able to view your health information using other applications (apps) compatible with our system.
You can access the FollowMyHealth Patient Portal offered by [facility] by registering at the following website: [email or web address]. By joining [employer]’s FollowMyHealth portal, you will also be able to view your health information using other applications (apps) compatible with our system.
You can access the FollowMyHealth Patient Portal offered by [facility] by registering at the following website: [email or web address]. By joining [employer]’s FollowMyHealth portal, you will also be able to view your health information using other applications (apps) compatible with our system.
You can access the FollowMyHealth Patient Portal offered by [facility] by registering at the following website: [email or web address]. By joining [employer]s FollowMyHealth portal, you will also be able to view your health information using other applications (apps) compatible with our system.
You can access the FollowMyHealth Patient Portal offered by [facility] by registering at the following website: [email or web address]. By joining [employer]’s FollowMyHealth portal, you will also be able to view your health information using other applications (apps) compatible with our system.

## 2023-04-28 NOTE — ECT PRE-PROCEDURE CHECKLIST - NSPROEDALEARNPREF_GEN_A_NUR
audio/verbal instruction/video/written material

## 2023-04-28 NOTE — ECT TREATMENT NOTE - NSECTIMPPLAN_PSY_ALL_CORE
Assessment today offers no contraindications to continue plan of treatment with ECT.

## 2023-04-28 NOTE — ECT AMBULATORY DISCHARGE PLAN - NURSING SECTION COMPLETE
Patient/Caregiver provided printed discharge information.

## 2023-04-28 NOTE — ECT TREATMENT NOTE - NSECTCOMMENTS_PSY_ALL_CORE
Patient stable through the entire treatment interval without recurrence of presenting symptoms. Mood, energy, sleep, and appetite were within normal limits. Enjoying a healthy lifestyle, walks with parents, focusing well and remembering for her classes. We will continue maintenance ECT to help reduce the chances of relapse; as per primary psychiatrist will RTC in 8 wks as tolerated, with plan to discontinue if stable.

## 2023-04-28 NOTE — ECT AMBULATORY DISCHARGE PLAN - NSPOSTECTFUPHCALL_PSY_ALL_CORE
You will receive a follow-up phone call from a nurse by the next business day after your treatment to ask how you are feeling.

## 2023-04-28 NOTE — ECT AMBULATORY DISCHARGE PLAN - NSDPACMPNY_GEN_ALL_CORE
Parents
Family
Parents

## 2023-04-28 NOTE — ECT TREATMENT NOTE - NSCGISEVERILLNESS_PSY_ALL_CORE
1 = Normal – not at all ill, symptoms of disorder not present past seven days
3 = Mildly ill – clearly established symptoms with minimal, if any, distress or difficulty in social and occupational function
1 = Normal – not at all ill, symptoms of disorder not present past seven days
1 = Normal – not at all ill, symptoms of disorder not present past seven days
3 = Mildly ill – clearly established symptoms with minimal, if any, distress or difficulty in social and occupational function
1 = Normal – not at all ill, symptoms of disorder not present past seven days
3 = Mildly ill – clearly established symptoms with minimal, if any, distress or difficulty in social and occupational function
1 = Normal – not at all ill, symptoms of disorder not present past seven days

## 2023-04-28 NOTE — ECT TREATMENT NOTE - NSECTMACHPARA1ST_PSY_ALL_CORE
Thymatron

## 2023-04-28 NOTE — ECT TREATMENT NOTE - NSECTROSNEGAT_PSY_ALL_CORE
Review of Systems negative/unchanged from previous exam except as noted below

## 2023-04-28 NOTE — ECT PRE-PROCEDURE CHECKLIST - NSPROEDALEARNPREFOTH_GEN_A_NUR
group instruction/individual instruction/pictorial/skill demonstration

## 2023-04-28 NOTE — ECT PRE-PROCEDURE CHECKLIST - NSPROEDAREADYLEARN_GEN_A_NUR
acuteness of illness

## 2023-04-28 NOTE — ECT PRE-PROCEDURE CHECKLIST - NSPROEDALEARNER_GEN_A_NUR
Cold/Sinus
family

## 2023-04-28 NOTE — ECT TREATMENT NOTE - NSICDXBHPRIMARYDX_PSY_ALL_CORE
Catatonia   F06.1  
Major psychotic depression, recurrent   F33.3  
Catatonia   F06.1  
Catatonia   F06.1

## 2023-04-28 NOTE — ECT TREATMENT NOTE - NSECTTXPERFDATETIME_PSY_ALL_CORE
16-Nov-2021 12:05
11-May-2022 09:24
23-Nov-2022 09:25
07-Dec-2021 11:06
16-Feb-2022 09:58
28-Apr-2023 09:17
14-Feb-2023 09:07
05-Oct-2021 14:06
14-Sep-2022 09:03
19-Oct-2021 14:29
30-Dec-2022 09:45
30-Mar-2022 09:27
02-Nov-2021 11:32
02-Aug-2022 09:33
11-Jan-2022 09:18
05-Jul-2022 08:57
12-Oct-2021 12:26
26-Oct-2022 09:42

## 2023-04-28 NOTE — ECT TREATMENT NOTE - NSSUICPROTFACT_PSY_ALL_CORE
Responsibility to children, family, or others/Identifies reasons for living
Responsibility to children, family, or others/Identifies reasons for living
Identifies reasons for living/Supportive social network of family or friends/Engaged in work or school
Identifies reasons for living/Supportive social network of family or friends/Engaged in work or school
Responsibility to children, family, or others/Identifies reasons for living
Responsibility to children, family, or others/Identifies reasons for living/Supportive social network of family or friends/Fear of death or the actual act of killing self/Cultural, spiritual and/or moral attitudes against suicide/Engaged in work or school/Positive therapeutic relationships/Ability to cope with stress/Frustration tolerance
Identifies reasons for living/Supportive social network of family or friends/Positive therapeutic relationships
Responsibility to children, family, or others/Identifies reasons for living
Responsibility to children, family, or others/Identifies reasons for living
Identifies reasons for living/Supportive social network of family or friends/Engaged in work or school
Responsibility to children, family, or others/Identifies reasons for living
Responsibility to children, family, or others/Identifies reasons for living
Supportive social network of family or friends/Positive therapeutic relationships
Responsibility to children, family, or others/Identifies reasons for living
Responsibility to children, family, or others/Identifies reasons for living
Responsibility to children, family, or others/Identifies reasons for living/Supportive social network of family or friends
Responsibility to children, family, or others/Identifies reasons for living/Supportive social network of family or friends/Fear of death or the actual act of killing self/Cultural, spiritual and/or moral attitudes against suicide
Responsibility to children, family, or others/Identifies reasons for living

## 2023-04-28 NOTE — ECT TREATMENT NOTE - NSECTTXELECPLACE_PSY_ALL_CORE
Bifrontal

## 2023-04-28 NOTE — ECT AMBULATORY DISCHARGE PLAN - NSPOSTECTWORSEPSYCHSX_PSY_ALL_CORE
If you are experiencing heightened or worsening psychological symptoms please contact your private psychiatrist.

## 2023-04-28 NOTE — ECT PRE-PROCEDURE CHECKLIST - CAREGIVER ADDRESS
[address] [place] [address]
[address] [place] [address]
[address] [place] [address]
[address] [place] [address]
[address] [place] [address]
[address] [place] [address]
[address] [place] [address]
[address] [place] [address]
[address] [place] [address]
[address] [place] [address]
[address] [place] [address]
[address] [place] [address]
[address] [place] [address]
[address] [place] [address]
[address] [place] [address]
[address] [place] [address]
[address] [place] [address]
[address] [place] [address]

## 2023-04-28 NOTE — ECT PRE-PROCEDURE CHECKLIST - NSPROEDAABILITYLEARN_GEN_A_NUR
anxiety

## 2023-04-28 NOTE — ECT PRE-PROCEDURE CHECKLIST - NSMEDSDOSETAKEN_PSY_ALL_CORE
no meds this am
 Lexapro 20 mg @0800
Abilify 5 mg, Lexapro 10 mg
 Lexapro 20 mg @0800
none
none
 Lexapro 20 mg @0800
 Lexapro 10 mg
 Lexapro 20 mg @0800
Abilify 5 mg, Lexapro 10 mg
 Lexapro 20 mg 
 no meds this am
 Lexapro 20 mg @0800
 Lexapro 20 mg 
Abilify 5 mg, Lexapro 10 mg
 Lexapro 20 mg 
 Lexapro 20 mg @0800
 no meds this am

## 2023-04-28 NOTE — ECT AMBULATORY DISCHARGE PLAN - NSPOSTECTCALLZHH_PSY_ALL_CORE
Call the  ECT suite at [phone]
Call the [facility] ECT suite at [phone]
Call the [facility] ECT suite at [phone]
Call the [facility] ECT suite at [phone]
Call the [facility] ECT suite at [phone]
Call the [facility] ECT suite at [phone]
Call the [facility] ECT suite at [phone]
Call the [facility] ECT suite at [phone]
Call the [facility] ECT suite at [phone]
Call the [facility] ECT suite at [phone]
Call the [facility] ECT suite at [phone]
Call the [facility] ECT suite at [phone]
Call the [facility] ECT suite at [phone]
Call the [facility] ECT suite at [phone]
Call the [facility] ECT suite at [phone]
Call the [facility] ECT suite at [phone]
Call the [facility] ECT suite at [phone]
Call the [facility] ECT suite at [phone]

## 2023-04-28 NOTE — ECT AMBULATORY DISCHARGE PLAN - NSECTPROCEDUREDATE_PSY_ALL_CORE
02-Nov-2021
05-Jul-2022
14-Sep-2022
07-Dec-2021
11-Jan-2022
14-Feb-2023
30-Dec-2022
19-Oct-2021
11-May-2022
05-Oct-2021
23-Nov-2022
26-Oct-2022
16-Feb-2022
12-Oct-2021
16-Nov-2021
02-Aug-2022
28-Apr-2023
30-Mar-2022

## 2023-04-28 NOTE — ECT AMBULATORY DISCHARGE PLAN - NSPOSTECTRESTRIC_PSY_ALL_CORE
Drive a car, operate power tools or machinery./Drink alcohol, beer, or wine./Make important personal and business decisions./If you have had any type of sedation, you may experience lightheadedness, dizziness, or sleepiness following your procedure.  A responsible adult should stay with you for at least 24 hours following your procedure.

## 2023-04-28 NOTE — ECT PRE-PROCEDURE CHECKLIST - HOW PATIENT ADDRESSED, PROFILE
[place]

## 2023-04-28 NOTE — ECT PRE-PROCEDURE CHECKLIST - NSDISPOFBELONG_PSY_ALL_CORE
not applicable

## 2023-04-28 NOTE — ECT AMBULATORY DISCHARGE PLAN - NSDCPNDISPN_GEN_ALL_CORE
Education provided on the pain management plan of care

## 2023-04-28 NOTE — ECT PRE-PROCEDURE CHECKLIST - HAND OFF
Unit RN to OR RN
Holding RN to OR RN
Holding RN to OR RN
Unit RN to OR RN
yes
Unit RN to OR RN
yes
Holding RN to OR RN
Holding RN to OR RN
yes
Unit RN to OR RN
yes
yes
Unit RN to OR RN
yes
Holding RN to OR RN

## 2023-04-28 NOTE — ECT PRE-PROCEDURE CHECKLIST - NSPTSENTTO_PSY_ALL_CORE
procedural room

## 2023-04-28 NOTE — ECT PRE-PROCEDURE CHECKLIST - LAST TOOK
solids
solids
clears
solids
clears
meds with sips of water at 0400/clears
solids
solids
meds with sips of water/clears
clears
solids
clears
clears
solids
solids

## 2023-04-28 NOTE — ECT AMBULATORY DISCHARGE PLAN - NSPOSTECTPTCOND_PSY_ALL_CORE
Stable

## 2023-04-28 NOTE — ECT TREATMENT NOTE - NS_RISKASSESSMENTINTER_PSY_ALL_CORE
Low Acute Suicide Risk

## 2023-04-28 NOTE — ECT AMBULATORY DISCHARGE PLAN - NSDCPEFALRISK_GEN_ALL_CORE
For information on Fall & Injury Prevention, visit: https://www.[facility]/news/fall-prevention-protects-and-maintains-health-and-mobility OR  https://www.[facility]/news/fall-prevention-tips-to-avoid-injury OR  https://www.cdc.gov/steadi/patient.html
For information on Fall & injury Prevention, visit https://www.[facility]/news/fall-prevention-tips-to-avoid-injury
For information on Fall & Injury Prevention, visit: https://www.[facility]/news/fall-prevention-protects-and-maintains-health-and-mobility OR  https://www.[facility]/news/fall-prevention-tips-to-avoid-injury OR  https://www.cdc.gov/steadi/patient.html
For information on Fall & Injury Prevention, visit: https://www.[facility]/news/fall-prevention-protects-and-maintains-health-and-mobility OR  https://www.[facility]/news/fall-prevention-tips-to-avoid-injury OR  https://www.cdc.gov/steadi/patient.html
For information on Fall & Injury Prevention, visit: https://www.[facility]/news/fall-prevention-protects-and-maintains-health-and-mobility OR  https://www.[facility]/news/fall-prevention-tips-to-avoid-injury OR  https://www.cdc.gov/steadi/patient.html
For information on Fall & Injury Prevention, visit: https://www.[facility]/news/fall-prevention-protects-and-maintains-health-and-mobility OR  https://www.[facility]/news/fall-prevention-tips-to-avoid-injury OR  https://www.cdc.gov/steadi/patient.html
For information on Fall & Injury Prevention, visit: https://www.[facility]/news/fall-prevention-protects-and-maintains-health-and-mobility OR  https://www.[facility]/news/fall-prevention-tips-to-avoid-injury OR  https://www.cdc.gov/steadi/patient.html
For information on Fall & injury Prevention, visit https://www.[facility]/news/fall-prevention-tips-to-avoid-injury
For information on Fall & Injury Prevention, visit: https://www.[facility]/news/fall-prevention-protects-and-maintains-health-and-mobility OR  https://www.[facility]/news/fall-prevention-tips-to-avoid-injury OR  https://www.cdc.gov/steadi/patient.html
For information on Fall & injury Prevention, visit https://www.[facility]/news/fall-prevention-tips-to-avoid-injury
For information on Fall & Injury Prevention, visit: https://www.[facility]/news/fall-prevention-protects-and-maintains-health-and-mobility OR  https://www.[facility]/news/fall-prevention-tips-to-avoid-injury OR  https://www.cdc.gov/steadi/patient.html
For information on Fall & Injury Prevention, visit: https://www.[facility]/news/fall-prevention-protects-and-maintains-health-and-mobility OR  https://www.[facility]/news/fall-prevention-tips-to-avoid-injury OR  https://www.cdc.gov/steadi/patient.html
For information on Fall & injury Prevention, visit https://www.[facility]/news/fall-prevention-tips-to-avoid-injury
For information on Fall & Injury Prevention, visit: https://www.[facility]/news/fall-prevention-protects-and-maintains-health-and-mobility OR  https://www.[facility]/news/fall-prevention-tips-to-avoid-injury OR  https://www.cdc.gov/steadi/patient.html
For information on Fall & Injury Prevention, visit: https://www.[facility]/news/fall-prevention-protects-and-maintains-health-and-mobility OR  https://www.[facility]/news/fall-prevention-tips-to-avoid-injury OR  https://www.cdc.gov/steadi/patient.html
For information on Fall & Injury Prevention, visit: https://www.[facility]/news/fall-prevention-protects-and-maintains-health-and-mobility OR  https://www.[facility]/news/fall-prevention-tips-to-avoid-injury OR  https://www.cdc.gov/steadi/patient.html
For information on Fall & injury Prevention, visit https://www.[facility]/news/fall-prevention-tips-to-avoid-injury
For information on Fall & Injury Prevention, visit: https://www.[facility]/news/fall-prevention-protects-and-maintains-health-and-mobility OR  https://www.[facility]/news/fall-prevention-tips-to-avoid-injury OR  https://www.cdc.gov/steadi/patient.html

## 2023-04-28 NOTE — ECT PRE-PROCEDURE CHECKLIST - PATIENT PROBLEMS/NEEDS
Patient expressed no known problems or needs

## 2023-04-28 NOTE — ECT PRE-PROCEDURE CHECKLIST - TO WHOM
Procedure room Nurse
To RN in procedure room
Procedure room Nurse
To RN in procedure room
Procedure room Nurse

## 2023-04-28 NOTE — ECT PRE-PROCEDURE CHECKLIST - CAREGIVER RELATION TO PATIENT
Father

## 2023-04-28 NOTE — ECT AMBULATORY DISCHARGE PLAN - NSPOSTECTCALLBEFORE_PSY_ALL_CORE
[facility] ([facility]) scheduling office at [phone]
[facility] ([facility]) scheduling office at [phone]
[facility] ([facility]) scheduling office at [phone]
[facility] ([facility]) scheduling office at [phone]
[facility] ([facility]) scheduling office at [phone]
[facility] ([facility]) scheduling office at [phone]
[facility] ([facility]) scheduling office at [phone]
[facility] ([facility]) scheduling office at [phone]
[facility] ([facility]) scheduling office at [phone]
[facility] ([facility]) scheduling office at [phone]
[facility] ([facility]) scheduling office at [phone]
[facility] ([facility]) scheduling office at [phone]
[facility] ([facility]) scheduling office at [phone]
[facility] ([facility]) scheduling office at [phone]
[facility] ([facility]) scheduling office at [phone]
[facility] ([facility]) scheduling office at [phone]
[facility] ([facility]) scheduling office at [phone]
[facility] ([facility]) scheduling office at [phone]

## 2023-04-28 NOTE — ECT PRE-PROCEDURE CHECKLIST - NSPROPTRIGHTNOTIFY_GEN_A_NUR
declines

## 2023-04-28 NOTE — ECT PRE-PROCEDURE CHECKLIST - NSADULTACCOMPRELATION_PSY_ALL_CORE
parent(s)
other (specify)
parent(s)
parent(s)
other (specify)

## 2023-04-28 NOTE — ECT TREATMENT NOTE - NSICDXBHSECONDARYDX_PSY_ALL_CORE
MDD (major depressive disorder), recurrent, severe, with psychosis   F33.3  

## 2023-04-28 NOTE — ECT AMBULATORY DISCHARGE PLAN - MODE OF TRANSPORTATION
Wheelchair/Stroller
Ambulatory
Wheelchair/Stroller

## 2023-04-28 NOTE — ECT PRE-PROCEDURE CHECKLIST - AS BP NONINV METHOD
electronic

## 2023-04-28 NOTE — ECT AMBULATORY DISCHARGE PLAN - NSPOSTECTSMOKING_PSY_ALL_CORE
If you are a smoker, it is important for your health to stop smoking.  Please be aware that second hand smoke is also harmful.

## 2023-04-28 NOTE — ECT AMBULATORY DISCHARGE PLAN - NSPOSTECTCASEEMER_PSY_ALL_CORE
In case of any emergency, please go to the nearest emergency room

## 2023-06-01 NOTE — ED BEHAVIORAL HEALTH ASSESSMENT NOTE - NS ED [facility] OTHER STREET DRUGS MEDICATION
Render In Strict Bullet Format?: Yes Initiate Treatment: Klisyri 1 % topical ointment in packet QD x5 days Detail Level: Zone Yes

## 2023-06-20 NOTE — BH CANCELLATION/NO SHOW NOTE - NSCANCELREASON_PSY_ALL_CORE
Change in treatment
Anesthesia review
Ill/Sick
Medical evaluation/Other
Other
Ill/Sick
Other
Rescheduled

## 2023-06-20 NOTE — BH CANCELLATION/NO SHOW NOTE - NSTYPENOTE_PSY_ALL_CORE
Patient/Family called to cancel
Clinician cancelled appointment
Patient/Family called to cancel
Clinician cancelled appointment
Patient/Family called to cancel

## 2023-06-20 NOTE — BH CANCELLATION/NO SHOW NOTE - NSCANCELAPPT_PSY_ALL_CORE
Ambulatory ECT

## 2023-06-20 NOTE — BH CANCELLATION/NO SHOW NOTE - NSCANCELORIG_PSY_ALL_CORE
18-Apr-2023
07-Sep-2022
10-Feb-2023
14-Mar-2023
23-Mar-2022
25-Jan-2023
19-Oct-2022
20-Jun-2023
28-Apr-2022
30-Aug-2022
12-Oct-2022
03-Feb-2023

## 2023-07-25 NOTE — ECT OUTPATIENT PROGRAM DISCHARGE SUMMARY - NSECTTREATMENTSUMMARY_PSY_ALL_CORE
Patient underwent a total of 23 bifrontal treatments with good effect, remained stable on monthly maintenance. Discontinued voluntarily after consultation with her psychiatrist, discharged in stable condition.

## 2024-02-12 NOTE — BH DISCHARGE NOTE NURSING/SOCIAL WORK/PSYCH REHAB - NSDCPEEDUCATION_PSY_ALL_CORE
[facility]   [address]  [place] [address]  [phone] p  [phone]    Procedure:  Level of Consciousness: [x]Alert [x]Oriented []Disoriented []Lethargic  Anxiety Level: [x]Calm []Anxious []Depressed []Other  Skin: []Warm [x]Dry []Cool []Moist []Intact []Other  Cardiovascular: [x]Palpitations: [x]Never []Occasionally []Frequently  Chest Pain: [x]No []Yes  Respiratory:  [x]Unlabored []Labored []Cough ([] Productive []Unproductive)  HCG Required: [x]No []Yes   Results: []Negative []Positive  Knowledge Level:        [x]Patient/Other verbalized understanding of pre-procedure instructions.        [x]Assessment of post-op care needs (transportation, responsible caregiver)        [x]Able to discuss health care problems and how to deal with it.  Factors that Affect Teaching:        Language Barrier: [x]No []Yes - why:        Hearing Loss:        [x]No []Yes            Corrective Device:  []Yes []No        Vision Loss:           [x]No []Yes            Corrective Device:  []Yes []No        Memory Loss:       [x]No []Yes            []Short Term []Long Term  Motivational Level:  [x]Asks Questions                  []Extremely Anxious       [x]Seems Interested               []Seems Uninterested                  [x]Denies need for Education  Risk for Injury:  [x]Patient oriented to person, place and time  []History of frequent falls/loss of balance  Nutritional:  []Change in appetite   []Weight Gain   []Weight Loss  Functional:  []Requires assistance with ADL's      Migraine  Follow up Assessment:  Patient experiences 30 headaches per month  Patient states that he/she has 20 headaches out of 30 days each month.  Patient states that he/she has 30 migraines out of 30 days each month.  Patient has experienced a  reduction in Migraine headaches less than 15 days per month []Yes [x]No  Patient has experienced a reduction in Migraine hours []Yes [x]No                Healthy Living/Influenza Vaccination/Safe and Effective Use of Medications/Relapse Prevention

## 2024-02-13 NOTE — ECT TREATMENT NOTE - NSECTFOCPELUNGS_PSY_ALL_CORE
February 15, 2024     Patient: Airam Clifford   YOB: 2000   Date of Visit: 2/13/2024       To Whom it May Concern:    Airam Clifford was seen in my clinic on 2/13/2024 at 3:00 pm.    Please excuse Guadalupe Valladares for her absence from work 2/12-2/19/24. Pt is recovering and should be cleared to return to work 2/20/2024. Sincerely,         Yesica Tolliver MD    Medical information is confidential and cannot be disclosed without the written consent of the patient or her representative.
Unremarkable

## 2024-03-05 NOTE — BH INPATIENT PSYCHIATRY PROGRESS NOTE - NSTXANXDATETRGT_PSY_ALL_CORE
REFERALLS resent  
19-Aug-2021
16-Sep-2021
02-Sep-2021
02-Sep-2021
30-Sep-2021
23-Sep-2021
19-Aug-2021
26-Aug-2021
30-Sep-2021
09-Sep-2021
16-Sep-2021
30-Sep-2021
09-Sep-2021
30-Sep-2021
26-Aug-2021
09-Sep-2021
19-Aug-2021
09-Sep-2021
23-Sep-2021
26-Aug-2021
19-Aug-2021
26-Aug-2021
16-Sep-2021
02-Sep-2021
19-Aug-2021
16-Sep-2021
23-Sep-2021
23-Sep-2021
16-Sep-2021
26-Aug-2021
09-Sep-2021
19-Aug-2021
02-Sep-2021
23-Sep-2021
02-Sep-2021

## 2024-03-19 PROCEDURE — 90832 PSYTX W PT 30 MINUTES: CPT | Mod: 93

## 2024-08-05 NOTE — BH INPATIENT PSYCHIATRY PROGRESS NOTE - NSCGIIMPROVESX_PSY_ALL_CORE
Patient would like communication of their results via:    Linda   4 = No change - symptoms remain essentially unchanged

## 2024-11-11 NOTE — ED BEHAVIORAL HEALTH ASSESSMENT NOTE - ORIENTED TO PLACE
Redd Barkley is requesting a refill on the following medication(s):  Requested Prescriptions     Pending Prescriptions Disp Refills    furosemide (LASIX) 20 MG tablet [Pharmacy Med Name: Furosemide 20 MG Oral Tablet] 60 tablet 0     Sig: Take 1 tablet by mouth twice daily       Last Visit Date (If Applicable):  10/21/2024    Next Visit Date:    1/21/2025   Yes

## 2025-02-09 NOTE — BH INPATIENT PSYCHIATRY ASSESSMENT NOTE - RISK ASSESSMENT
(M6) obeys commands
pt has a chronic elevated background risk given past psychiatric hospitalization and substance abuse. Protective factors- no reported (per family) SI/HI, enrolled in school, supportive family, good therapeutic alliance, compliant w tx.   Unable to obtain accurate SI risk as pt is not answering questions

## 2025-07-15 NOTE — ED PROVIDER NOTE - BIRTH SEX
Please let patient know that his blood sugar, electrolytes, liver, PSA, and kidney tests were all normal.  Please review cholesterol.  Goal LDL < 100, HDL > 40, Trig < 150. Thank you.    Unknown